# Patient Record
Sex: FEMALE | URBAN - METROPOLITAN AREA
[De-identification: names, ages, dates, MRNs, and addresses within clinical notes are randomized per-mention and may not be internally consistent; named-entity substitution may affect disease eponyms.]

---

## 2019-10-16 ENCOUNTER — APPOINTMENT (OUTPATIENT)
Age: 84
Setting detail: DERMATOLOGY
End: 2019-10-21

## 2019-10-16 DIAGNOSIS — L85.8 OTHER SPECIFIED EPIDERMAL THICKENING: ICD-10-CM

## 2019-10-16 DIAGNOSIS — L57.8 OTHER SKIN CHANGES DUE TO CHRONIC EXPOSURE TO NONIONIZING RADIATION: ICD-10-CM

## 2019-10-16 PROBLEM — D48.5 NEOPLASM OF UNCERTAIN BEHAVIOR OF SKIN: Status: ACTIVE | Noted: 2019-10-16

## 2019-10-16 PROCEDURE — 88305 TISSUE EXAM BY PATHOLOGIST: CPT

## 2019-10-16 PROCEDURE — 11102 TANGNTL BX SKIN SINGLE LES: CPT

## 2019-10-16 PROCEDURE — OTHER PATHOLOGY BILLING: OTHER

## 2019-10-16 PROCEDURE — OTHER BIOPSY BY SHAVE METHOD: OTHER

## 2019-10-16 PROCEDURE — OTHER SUNSCREEN RECOMMENDATIONS: OTHER

## 2019-10-16 PROCEDURE — 99202 OFFICE O/P NEW SF 15 MIN: CPT | Mod: 25

## 2019-10-16 ASSESSMENT — LOCATION DETAILED DESCRIPTION DERM
LOCATION DETAILED: LEFT CENTRAL MALAR CHEEK
LOCATION DETAILED: RIGHT INFERIOR CENTRAL MALAR CHEEK

## 2019-10-16 ASSESSMENT — LOCATION SIMPLE DESCRIPTION DERM
LOCATION SIMPLE: LEFT CHEEK
LOCATION SIMPLE: RIGHT CHEEK

## 2019-10-16 ASSESSMENT — LOCATION ZONE DERM: LOCATION ZONE: FACE

## 2019-10-16 NOTE — PROCEDURE: PATHOLOGY BILLING
Immunohistochemistry ([id number] and [id number]) billing is not performed here. Please use the Immunohistochemistry Stain Billing plan to accomplish this. Immunohistochemistry ([id number] and [id number]) billing is not performed here. Please use the Immunohistochemistry Stain Billing plan to accomplish this.

## 2019-10-16 NOTE — PROCEDURE: BIOPSY BY SHAVE METHOD
Bill For Surgical Tray: no
Detail Level: Simple
Size Of Lesion In Cm: 0
Hemostasis: Drysol
Render Post-Care Instructions In Note?: yes
Notification Instructions: Patient will be notified of biopsy results. However, patient instructed to call the office if not contacted within 2 weeks.
Wound Care: Petrolatum
Biopsy Type: H and E
Consent: Written consent was obtained and risks were reviewed including but not limited to scarring, infection, bleeding, scabbing, incomplete removal, nerve damage and allergy to anesthesia.
Dressing: bandage
Curettage Text: The wound bed was treated with curettage after the biopsy was performed.
Anesthesia Volume In Cc (Will Not Render If 0): 0.3
Electrodesiccation And Curettage Text: The wound bed was treated with electrodesiccation and curettage after the biopsy was performed.
Billing Type: Third-Party Bill
Electrodesiccation Text: The wound bed was treated with electrodesiccation after the biopsy was performed.
Silver Nitrate Text: The wound bed was treated with silver nitrate after the biopsy was performed.
Anesthesia Type: 1% lidocaine with epinephrine
Post-Care Instructions: I reviewed with the patient in detail post-care instructions. Patient is to keep the biopsy site dry overnight, and then apply vaseline or Aquaphor with a new bandaid daily until healed.
Type Of Destruction Used: Curettage
Depth Of Biopsy: dermis
Biopsy Method: Dermablade
Cryotherapy Text: The wound bed was treated with cryotherapy after the biopsy was performed.

## 2019-10-16 NOTE — PROCEDURE: SUNSCREEN RECOMMENDATIONS
Never
General Sunscreen Counseling: I recommended a broad spectrum sunscreen with a SPF of 30 or higher.  I explained that SPF 30 sunscreens block approximately 97 percent of the sun's harmful rays.  Sunscreens should be applied at least 15 minutes prior to expected sun exposure and then every 2 hours after that as long as sun exposure continues. If swimming or exercising sunscreen should be reapplied every 45 minutes to an hour after getting wet or sweating.  One ounce, or the equivalent of a shot glass full of sunscreen, is adequate to protect the skin not covered by a bathing suit. I also recommended a lip balm with a sunscreen as well. Sun protective clothing can be used in lieu of sunscreen but must be worn the entire time you are exposed to the sun's rays.
Detail Level: Generalized

## 2021-06-22 ENCOUNTER — APPOINTMENT (OUTPATIENT)
Dept: GENERAL RADIOLOGY | Facility: CLINIC | Age: 86
End: 2021-06-22
Attending: EMERGENCY MEDICINE
Payer: COMMERCIAL

## 2021-06-22 ENCOUNTER — HOSPITAL ENCOUNTER (EMERGENCY)
Facility: CLINIC | Age: 86
Discharge: HOME | End: 2021-06-22
Attending: EMERGENCY MEDICINE | Admitting: EMERGENCY MEDICINE
Payer: COMMERCIAL

## 2021-06-22 VITALS
DIASTOLIC BLOOD PRESSURE: 100 MMHG | RESPIRATION RATE: 20 BRPM | SYSTOLIC BLOOD PRESSURE: 143 MMHG | OXYGEN SATURATION: 99 % | HEART RATE: 60 BPM

## 2021-06-22 DIAGNOSIS — Z20.822 EXPOSURE TO COVID-19 VIRUS: ICD-10-CM

## 2021-06-22 DIAGNOSIS — Z20.822 SUSPECTED COVID-19 VIRUS INFECTION: ICD-10-CM

## 2021-06-22 LAB
ALBUMIN SERPL-MCNC: 3.1 G/DL (ref 3.4–5)
ALP SERPL-CCNC: 79 U/L (ref 40–150)
ALT SERPL W P-5'-P-CCNC: 52 U/L (ref 0–50)
ANION GAP SERPL CALCULATED.3IONS-SCNC: 11 MMOL/L (ref 3–14)
AST SERPL W P-5'-P-CCNC: 63 U/L (ref 0–45)
BASE EXCESS BLDV CALC-SCNC: 0 MMOL/L
BASOPHILS # BLD AUTO: 0 10E9/L (ref 0–0.2)
BASOPHILS NFR BLD AUTO: 0.4 %
BILIRUB SERPL-MCNC: 0.2 MG/DL (ref 0.2–1.3)
BUN SERPL-MCNC: 21 MG/DL (ref 7–30)
CALCIUM SERPL-MCNC: 9.1 MG/DL (ref 8.5–10.1)
CHLORIDE SERPL-SCNC: 101 MMOL/L (ref 94–109)
CO2 SERPL-SCNC: 25 MMOL/L (ref 20–32)
CREAT BLD-MCNC: 1.2 MG/DL (ref 0.52–1.04)
CREAT SERPL-MCNC: 1.15 MG/DL (ref 0.52–1.04)
DIFFERENTIAL METHOD BLD: ABNORMAL
EOSINOPHIL # BLD AUTO: 0 10E9/L (ref 0–0.7)
EOSINOPHIL NFR BLD AUTO: 0.4 %
ERYTHROCYTE [DISTWIDTH] IN BLOOD BY AUTOMATED COUNT: 12.2 % (ref 10–15)
GFR SERPL CREATININE-BSD FRML MDRD: 40 ML/MIN/{1.73_M2}
GFR SERPL CREATININE-BSD FRML MDRD: 42 ML/MIN/{1.73_M2}
GLUCOSE SERPL-MCNC: 151 MG/DL (ref 70–99)
HCO3 BLDV-SCNC: 27 MMOL/L (ref 21–28)
HCT VFR BLD AUTO: 35.4 % (ref 35–47)
HGB BLD-MCNC: 11.7 G/DL (ref 11.7–15.7)
IMM GRANULOCYTES # BLD: 0.1 10E9/L (ref 0–0.4)
IMM GRANULOCYTES NFR BLD: 1.6 %
LABORATORY COMMENT REPORT: ABNORMAL
LYMPHOCYTES # BLD AUTO: 1.1 10E9/L (ref 0.8–5.3)
LYMPHOCYTES NFR BLD AUTO: 22.2 %
MCH RBC QN AUTO: 32.1 PG (ref 26.5–33)
MCHC RBC AUTO-ENTMCNC: 33.1 G/DL (ref 31.5–36.5)
MCV RBC AUTO: 97 FL (ref 78–100)
MONOCYTES # BLD AUTO: 0.8 10E9/L (ref 0–1.3)
MONOCYTES NFR BLD AUTO: 16.4 %
NEUTROPHILS # BLD AUTO: 2.9 10E9/L (ref 1.6–8.3)
NEUTROPHILS NFR BLD AUTO: 59 %
NRBC # BLD AUTO: 0 10*3/UL
NRBC BLD AUTO-RTO: 0 /100
NT-PROBNP SERPL-MCNC: 678 PG/ML (ref 0–1800)
O2/TOTAL GAS SETTING VFR VENT: ABNORMAL %
OXYHGB MFR BLDV: 31 %
PCO2 BLDV: 52 MM HG (ref 40–50)
PH BLDV: 7.32 PH (ref 7.32–7.43)
PLATELET # BLD AUTO: 219 10E9/L (ref 150–450)
PO2 BLDV: 23 MM HG (ref 25–47)
POTASSIUM SERPL-SCNC: 4.4 MMOL/L (ref 3.4–5.3)
PROT SERPL-MCNC: 7.6 G/DL (ref 6.8–8.8)
RBC # BLD AUTO: 3.65 10E12/L (ref 3.8–5.2)
SARS-COV-2 RNA RESP QL NAA+PROBE: POSITIVE
SODIUM SERPL-SCNC: 137 MMOL/L (ref 133–144)
SPECIMEN SOURCE: ABNORMAL
WBC # BLD AUTO: 5 10E9/L (ref 4–11)

## 2021-06-22 PROCEDURE — 80053 COMPREHEN METABOLIC PANEL: CPT | Performed by: EMERGENCY MEDICINE

## 2021-06-22 PROCEDURE — 71045 X-RAY EXAM CHEST 1 VIEW: CPT

## 2021-06-22 PROCEDURE — 93005 ELECTROCARDIOGRAM TRACING: CPT

## 2021-06-22 PROCEDURE — 82805 BLOOD GASES W/O2 SATURATION: CPT | Performed by: EMERGENCY MEDICINE

## 2021-06-22 PROCEDURE — 82565 ASSAY OF CREATININE: CPT

## 2021-06-22 PROCEDURE — 87635 SARS-COV-2 COVID-19 AMP PRB: CPT | Performed by: EMERGENCY MEDICINE

## 2021-06-22 PROCEDURE — 99285 EMERGENCY DEPT VISIT HI MDM: CPT | Mod: 25

## 2021-06-22 PROCEDURE — 83880 ASSAY OF NATRIURETIC PEPTIDE: CPT | Performed by: EMERGENCY MEDICINE

## 2021-06-22 PROCEDURE — 85025 COMPLETE CBC W/AUTO DIFF WBC: CPT | Performed by: EMERGENCY MEDICINE

## 2021-06-22 PROCEDURE — C9803 HOPD COVID-19 SPEC COLLECT: HCPCS

## 2021-06-22 RX ORDER — DEXTROMETHORPHAN POLISTIREX 30 MG/5ML
60 SUSPENSION ORAL 2 TIMES DAILY
Qty: 89 ML | Refills: 0 | Status: ON HOLD | OUTPATIENT
Start: 2021-06-22 | End: 2021-07-01

## 2021-06-22 RX ORDER — ALBUTEROL SULFATE 90 UG/1
2 AEROSOL, METERED RESPIRATORY (INHALATION) EVERY 6 HOURS PRN
Qty: 6.7 G | Refills: 0 | Status: SHIPPED | OUTPATIENT
Start: 2021-06-22

## 2021-06-22 RX ORDER — DEXAMETHASONE 2 MG/1
6 TABLET ORAL ONCE
Qty: 3 TABLET | Refills: 0 | Status: ON HOLD | OUTPATIENT
Start: 2021-06-22 | End: 2021-07-01

## 2021-06-22 RX ORDER — OXYMETAZOLINE HCL 0.05 %
2 SPRAY, NON-AEROSOL (ML) NASAL 2 TIMES DAILY
Refills: 0 | COMMUNITY
Start: 2021-06-22 | End: 2021-06-25

## 2021-06-22 NOTE — ED TRIAGE NOTES
Pt is [place] speaking and arrives from home via EMS for cough and SOB x1 day. Family that she lives with was diagnosed with Covid 2 weeks ago. No increase in work of breathing, 99% on RA, all other VSS. Denies chest pain, abdominal pain, urinary sx. A&O x4

## 2021-06-22 NOTE — ED PROVIDER NOTES
History   Chief Complaint:  Cough and Shortness of Breath       HPI   Nhi Perry is a 89 year old female presenting for evaluation of 1 day cough without fevers or chills and no chest pain.  She was exposed to Covid through her daughter and son-in-law who were sick 2 weeks ago and 1 week ago respectively.  She reports intermittent productive cough, cough worse at night but no fevers chills or body aches.  She denies any chest pain, abdominal pain or chest pressure or shortness of breath.    Review of Systems  10 point review of systems completed, negative except as any can HPI    Allergies:  Amoxicillin    Medications:  Cetirizine  Citalopram  Ergocalciferol  Gabapentin  Hydrocodone-acetaminophen  Levothyroxine  Lisinopril  Metformin  Metoprolol  Oxycodone  Senna-docusate  Simvastatin  Meclizine  Memantine  Celocoxib  Bisacodyl  Quetiapine  Donepezil  Cyclobenzaprine  Zolpidem    Past Medical History:    Arthritis  ASCVD  Diabetes mellitus, type 2  Hypertension  Hypothyroidism  Intertrochanteric fracture of the left femur  Osteoporosis  Retina disorder, left  Thyroid disease  Intertochanteric fracture of right femur  Hip fracture, intertochanteric  Squamous cell carcinoma of skin  Insomnia  Macular degeneration of retina  Spinal stenosis of lumbar region  Depression  Degenerative joint disease  Nephrolithiasis    Past Surgical History:    Carotid endarterectomy  Cholecystectomy  Cataract removal  Genitourinary surgery  Open reduction internal fixation hip nailing    Family History:    Father: Cancer  Mother: Alzheimer disease, hypertension    Social History:  Family at home are also sick with similar symptoms    Physical Exam     Patient Vitals for the past 24 hrs:   BP Pulse Resp SpO2   06/22/21 1425 (!) 163/75 67 20 99 %       Physical Exam  Constitutional: Alert, attentive, GCS 15  HENT:    Nose: Nose normal.    Mouth/Throat: Oropharynx is clear, mucous membranes are moist  Eyes: EOM are normal,  anicteric, conjugate gaze  CV: regular rate and rhythm; no murmurs  Chest: Effort normal and breath sounds clear without wheezing or rales, symmetric bilaterally   GI:  non tender. No distension. No guarding or rebound.    MSK: No LE edema, no tenderness to palpation of BLE.  Neurological: Alert, attentive, moving all extremities equally.   Skin: Skin is warm and dry.    Emergency Department Course   ECG  ECG taken at 1419  Sinus rhythm with occasional premature ventricular complexes and fusion complexes  Otherwise normal ECG  No significant change as compared to prior, dated 14.  Rate 61 bpm. ME interval 202 ms. QRS duration 78 ms. QT/QTc 476/479 ms. P-R-T axes 73 58 69.     Imaging:  XR Chest Port 1 View  Single portable AP view of the chest was obtained.  Cardiomediastinal silhouette is within normal limits. There is a  densely calcified left pleural plaque. Biapical scarring. Bilateral  calcified pulmonary granulomas. No significant pleural effusion or  pneumothorax.  As per Radiology    Laboratory:  CBC: WBC 5.0, HGB 11.7,      CMP: Glucose 151 (H), GFR estimate 40 (L), albumin 3.1 (L), ALT 52 (H), AST 63 (H) o/w WNL (Creatinine 1.15 (H))     blood gas venous and oxyhgb: pH: 7.32, PCO2: 52 (H), PO2: 23 (L), bicarbonate: 27, Oxyhgb: 31, FIO2 RA, base excess 0.0    Creatinine POCT: Creatinine 1.2 (H), GFR estimate 42 (L)    BNP: 678    Symptomatic Covid-19 Virus PCR: Positive (A)    Emergency Department Course:    Reviewed:  I reviewed nursing notes, vitals, past medical history and care everywhere    Assessments:  1435 I obtained history and examined the patient as noted above.     1452 Spoke with family regarding patient's presentation and future plan    1615 I rechecked the patient and explained findings.     Disposition:  The patient was discharged to home.     Impression & Plan     Medical Decision Makin-year-old woman past medical history scant for diabetes, hypertension presenting for  evaluation of cough for 1 day in the setting of positive exposure to COVID-19.  She denies fevers, chills or body aches, she is intermittent productive cough over the last 1 day with no overt chest pain, chest pressure or shortness of breath.  Chest x-ray here is clear, labs are unremarkable including BNP, EKG shows no evidence of ischemia.  Low suspicion for ACS or PE given history.  I suspect probable early Covid, however given absence of hypoxia and no indication for hospitalization and patient is reportedly wheelchair-bound.  Both myself and the nurse back throughout the time discussing need for hospitalization with patient's daughter and given no clear indication at this time she was sent home via wheelchair.  I recommended monitoring of her breathing, I recommended Afrin at night as well as an albuterol inhaler as needed for cough.  Return precautions were reviewed and she was discharged.    Covid-19  Nhi Perry was evaluated during a global COVID-19 pandemic, which necessitated consideration that the patient might be at risk for infection with the SARS-CoV-2 virus that causes COVID-19.   Applicable protocols for evaluation were followed during the patient's care.   COVID-19 was considered as part of the patient's evaluation. The plan for testing is:  a test was obtained during this visit.    Diagnosis:    ICD-10-CM    1. Exposure to COVID-19 virus  Z20.822    2. Suspected COVID-19 virus infection  Z20.822        Discharge Medications:  New Prescriptions    ALBUTEROL (PROAIR HFA/PROVENTIL HFA/VENTOLIN HFA) 108 (90 BASE) MCG/ACT INHALER    Inhale 2 puffs into the lungs every 6 hours as needed for shortness of breath / dyspnea or wheezing    OXYMETAZOLINE (AFRIN NASAL SPRAY) 0.05 % NASAL SPRAY    Spray 2 sprays in nostril 2 times daily for 3 days     Rufino See MD  [employer]  8:34 PM 06/22/21     Scribe Disclosure:  Mandeep JHA, am serving as a scribe at  2:38 PM on 6/22/2021 to document services personally performed by Rufino See MD based on my observations and the provider's statements to me.    I, Quinn Thomas, am serving as a scribe  at 4:31 PM on 6/22/2021 to document services personally performed by Rufino See MD based on my observations and the provider's statements to me.               Rufino See MD  06/22/21 2035

## 2021-06-22 NOTE — DISCHARGE INSTRUCTIONS
Discharge Instructions  COVID-19    COVID-19 is the disease caused by a new coronavirus. The virus spreads from person-to-person primarily by droplets when an infected person coughs or sneezes and the droplets are then breathed in by another person. There are tests available to diagnose COVID-19. You may have been diagnosed with COVID, may be being tested for COVID and have a pending test result, or may have been exposed to COVID.    Symptoms of COVID-19  Many people have no symptoms or mild symptoms.  Symptoms may usually appear 4 to 5 days (up to 14 days) after contact with a person with COVID-19. Some people will get severe symptoms and pneumonia. Usual symptoms are:     ? Fever  ? Cough  ? Trouble breathing    Less common symptoms are: Headache, body aches, sore throat, sneezing, diarrhea, loss of taste or smell.    Isolation and Quarantine    You may have been seen because you have symptoms, had an exposure, or had some other concern about possible COVID. The best way to stop the spread of the virus is to avoid contact with others.    Isolation refers to sick people staying away from people who are not sick. A person in quarantine is limiting activity because they were exposed and are waiting to see if they might become sick.    If you test positive for COVID, you should stay home (isolation) for at least 10 days after your symptoms began, and for 24 hours with no fever and improvement of symptoms--whichever is longer. (Your fever should be gone for 24 hours without using fever-reducing medicine). If you have no symptoms, you should stay home (isolation) for 10 days from the day of the test. If you have been vaccinated for COVID, the vaccination will not cause you to test positive so a positive test result generally is a  true positive .    For example, if you have a fever and cough for 6 days, you need to stay home 4 more days with no fever for a total of 10 days. Or, if you have a fever and cough for 10 days,  you need to stay home one more day with no fever for a total of 11 days.    If you have a high-risk exposure to COVID (you spent 15 minutes or more within six feet of somebody who has COVID), you should stay home (quarantine) for 14 days, unless you are vaccinated. Even if you test negative for COVID, the CDC recommends a 14-day quarantine from the time of your last exposure to that individual (unless you are vaccinated). There are options for a shortened (<14 day quarantine) you can review at:  https://[email or web address]./diseases/coronavirus/close.html#long    If you live in the same house as somebody with COVID and cannot separate from them, you will need to quarantine for 14-days after that person's isolation (infectious) period. That means that you may need to quarantine for 24-days after that person became symptomatic/ill.    If you are vaccinated and do not develop symptoms, you do not need to quarantine after exposure.    If you have symptoms but a negative test, you should stay at home until you are symptom-free and without fever for 24 hours, using the same judgment you would for when it is safe to return to work/school from strep throat, influenza, or the common cold. If you worsen, you should consider being re-evaluated.    If you are being tested for COVID because of symptoms and your test is pending, you should stay home until you know your test result.    If I have COVID, how should I protect myself and others?    Do not go to work or school. Have a friend or relative do your shopping. Do not use public transportation (bus, train) or ridesharing (Lyft, Uber).    Separate yourself from other people in your home. As much as possible, you should stay in one room and away from other people in your home. Also, use a separate bathroom, if possible. Avoid handling pets or other animals while sick.     Wear a facemask if you need to be around other people and cover your mouth and nose with a tissue when  you cough or sneeze.     Avoid sharing personal household items. You should not share dishes, drinking glasses, forks/knives/spoons, towels, or bedding with other people in your home. After using these items, they should be washed with soap and water. Clean parts of your home that are touched often (doorknobs, faucets, countertops, etc.) daily.     Wash your hands often with soap and water for at least 20 seconds or use an alcohol-based hand  containing at least 60% alcohol.     Avoid touching your face.    Treat your symptoms. You can take Acetaminophen (Tylenol) to treat body aches and fever as needed for comfort. Ibuprofen (Advil or Motrin) can be used as well if you still have symptoms after taking Tylenol. Drink fluids. Rest.    Watch for worsening symptoms such as shortness of breath/difficulty breathing or very severe weakness.    Employers/workplaces are being asked by the Centers for Disease Control (CDC) to not request notes/documentation for you to return to work or prove that you were ill. You may choose to show your employer this paperwork. Also, repeat testing should not be required to return to work.    Exercise/Sports in rare cases, COVID could affect your heart in a way that makes exercise or participation in sports dangerous.    If you have a mild COVID illness (fever, cough, sore throat, and similar symptoms but no difficulty breathing or abnormalities of the lung): After your COVID symptoms have resolved, wait 14-days before returning to activity.  If you have more than a mild illness (meaning that you have problems with your breathing or lungs) or if you participate in competitive or strenuous activity or have a history of heart disease: Please see your primary doctor/provider prior to return to activity/competition.    Antibody treatments are available for patients with mild to moderate COVID illness in order to prevent severe illness. In general, only patients with risk factors for  severe illness are eligible for treatment. For more information, to see if you are eligible, and to find treatment, go to the [facility]:  [email or web address]./diseases/coronavirus/mnrap.html     Return to the Emergency Department if:    If you are developing worsening breathing, shortness of breath, or feel worse you should seek medical attention.  If you are uncertain, contact your health care provider/clinic. If you need emergency medical attention, call 911 and tell them you have been ill.

## 2021-06-22 NOTE — ED NOTES
Talked to pt's daughter to inform her pt will be returning home. Confirmed address and let her know we will be sending wheelchair transport home.

## 2021-06-23 LAB — INTERPRETATION ECG - MUSE: NORMAL

## 2021-07-01 ENCOUNTER — HOSPITAL ENCOUNTER (INPATIENT)
Facility: CLINIC | Age: 86
LOS: 4 days | Discharge: HOME | DRG: 177 | End: 2021-07-05
Attending: PHYSICIAN ASSISTANT | Admitting: INTERNAL MEDICINE
Payer: COMMERCIAL

## 2021-07-01 ENCOUNTER — APPOINTMENT (OUTPATIENT)
Dept: CT IMAGING | Facility: CLINIC | Age: 86
DRG: 177 | End: 2021-07-01
Attending: PHYSICIAN ASSISTANT
Payer: COMMERCIAL

## 2021-07-01 ENCOUNTER — APPOINTMENT (OUTPATIENT)
Dept: GENERAL RADIOLOGY | Facility: CLINIC | Age: 86
DRG: 177 | End: 2021-07-01
Attending: PHYSICIAN ASSISTANT
Payer: COMMERCIAL

## 2021-07-01 DIAGNOSIS — J12.82 PNEUMONIA DUE TO 2019 NOVEL CORONAVIRUS: ICD-10-CM

## 2021-07-01 DIAGNOSIS — M62.81 GENERALIZED MUSCLE WEAKNESS: ICD-10-CM

## 2021-07-01 DIAGNOSIS — G93.49 INFECTIOUS ENCEPHALOPATHY: ICD-10-CM

## 2021-07-01 DIAGNOSIS — E87.1 HYPONATREMIA: ICD-10-CM

## 2021-07-01 DIAGNOSIS — B99.9 INFECTIOUS ENCEPHALOPATHY: ICD-10-CM

## 2021-07-01 DIAGNOSIS — E87.5 HYPERKALEMIA: ICD-10-CM

## 2021-07-01 DIAGNOSIS — U07.1 PNEUMONIA DUE TO 2019 NOVEL CORONAVIRUS: ICD-10-CM

## 2021-07-01 LAB
ABO + RH BLD: NORMAL
ABO + RH BLD: NORMAL
ALBUMIN SERPL-MCNC: 2.4 G/DL (ref 3.4–5)
ALBUMIN SERPL-MCNC: 2.7 G/DL (ref 3.4–5)
ALP SERPL-CCNC: 102 U/L (ref 40–150)
ALP SERPL-CCNC: 94 U/L (ref 40–150)
ALT SERPL W P-5'-P-CCNC: 57 U/L (ref 0–50)
ALT SERPL W P-5'-P-CCNC: 66 U/L (ref 0–50)
ANION GAP SERPL CALCULATED.3IONS-SCNC: 5 MMOL/L (ref 3–14)
ANION GAP SERPL CALCULATED.3IONS-SCNC: 7 MMOL/L (ref 3–14)
APTT PPP: 33 SEC (ref 22–37)
AST SERPL W P-5'-P-CCNC: 30 U/L (ref 0–45)
AST SERPL W P-5'-P-CCNC: 35 U/L (ref 0–45)
BASE DEFICIT BLDV-SCNC: 3.2 MMOL/L
BASOPHILS # BLD AUTO: 0 10E9/L (ref 0–0.2)
BASOPHILS # BLD AUTO: 0 10E9/L (ref 0–0.2)
BASOPHILS NFR BLD AUTO: 0.2 %
BASOPHILS NFR BLD AUTO: 0.5 %
BILIRUB SERPL-MCNC: 0.2 MG/DL (ref 0.2–1.3)
BILIRUB SERPL-MCNC: 0.4 MG/DL (ref 0.2–1.3)
BUN SERPL-MCNC: 34 MG/DL (ref 7–30)
BUN SERPL-MCNC: 35 MG/DL (ref 7–30)
CALCIUM SERPL-MCNC: 7.9 MG/DL (ref 8.5–10.1)
CALCIUM SERPL-MCNC: 8.4 MG/DL (ref 8.5–10.1)
CHLORIDE SERPL-SCNC: 105 MMOL/L (ref 94–109)
CHLORIDE SERPL-SCNC: 99 MMOL/L (ref 94–109)
CO2 SERPL-SCNC: 20 MMOL/L (ref 20–32)
CO2 SERPL-SCNC: 23 MMOL/L (ref 20–32)
CREAT SERPL-MCNC: 1.21 MG/DL (ref 0.52–1.04)
CREAT SERPL-MCNC: 1.48 MG/DL (ref 0.52–1.04)
CRP SERPL-MCNC: 36.1 MG/L (ref 0–8)
D DIMER PPP FEU-MCNC: 1.4 UG/ML FEU (ref 0–0.5)
D DIMER PPP FEU-MCNC: 1.7 UG/ML FEU (ref 0–0.5)
DIFFERENTIAL METHOD BLD: ABNORMAL
DIFFERENTIAL METHOD BLD: ABNORMAL
EOSINOPHIL # BLD AUTO: 0 10E9/L (ref 0–0.7)
EOSINOPHIL # BLD AUTO: 0.1 10E9/L (ref 0–0.7)
EOSINOPHIL NFR BLD AUTO: 0.2 %
EOSINOPHIL NFR BLD AUTO: 3 %
ERYTHROCYTE [DISTWIDTH] IN BLOOD BY AUTOMATED COUNT: 12.6 % (ref 10–15)
ERYTHROCYTE [DISTWIDTH] IN BLOOD BY AUTOMATED COUNT: 12.7 % (ref 10–15)
FIBRINOGEN PPP-MCNC: 519 MG/DL (ref 200–420)
GFR SERPL CREATININE-BSD FRML MDRD: 30 ML/MIN/{1.73_M2}
GFR SERPL CREATININE-BSD FRML MDRD: 38 ML/MIN/{1.73_M2}
GLUCOSE BLDC GLUCOMTR-MCNC: 286 MG/DL (ref 70–99)
GLUCOSE BLDC GLUCOMTR-MCNC: 441 MG/DL (ref 70–99)
GLUCOSE SERPL-MCNC: 218 MG/DL (ref 70–99)
GLUCOSE SERPL-MCNC: 279 MG/DL (ref 70–99)
HBA1C MFR BLD: 6.8 % (ref 0–5.6)
HCO3 BLDV-SCNC: 23 MMOL/L (ref 21–28)
HCT VFR BLD AUTO: 29.7 % (ref 35–47)
HCT VFR BLD AUTO: 33.9 % (ref 35–47)
HGB BLD-MCNC: 11 G/DL (ref 11.7–15.7)
HGB BLD-MCNC: 9.7 G/DL (ref 11.7–15.7)
IMM GRANULOCYTES # BLD: 0.1 10E9/L (ref 0–0.4)
IMM GRANULOCYTES # BLD: 0.1 10E9/L (ref 0–0.4)
IMM GRANULOCYTES NFR BLD: 2 %
IMM GRANULOCYTES NFR BLD: 2.4 %
INR PPP: 1.14 (ref 0.86–1.14)
INTERPRETATION ECG - MUSE: NORMAL
LDH SERPL L TO P-CCNC: 211 U/L (ref 81–234)
LYMPHOCYTES # BLD AUTO: 0.3 10E9/L (ref 0.8–5.3)
LYMPHOCYTES # BLD AUTO: 0.6 10E9/L (ref 0.8–5.3)
LYMPHOCYTES NFR BLD AUTO: 15.2 %
LYMPHOCYTES NFR BLD AUTO: 7.6 %
MCH RBC QN AUTO: 30.6 PG (ref 26.5–33)
MCH RBC QN AUTO: 30.9 PG (ref 26.5–33)
MCHC RBC AUTO-ENTMCNC: 32.4 G/DL (ref 31.5–36.5)
MCHC RBC AUTO-ENTMCNC: 32.7 G/DL (ref 31.5–36.5)
MCV RBC AUTO: 94 FL (ref 78–100)
MCV RBC AUTO: 95 FL (ref 78–100)
MONOCYTES # BLD AUTO: 0.1 10E9/L (ref 0–1.3)
MONOCYTES # BLD AUTO: 0.3 10E9/L (ref 0–1.3)
MONOCYTES NFR BLD AUTO: 2.4 %
MONOCYTES NFR BLD AUTO: 6.8 %
NEUTROPHILS # BLD AUTO: 2.9 10E9/L (ref 1.6–8.3)
NEUTROPHILS # BLD AUTO: 3.7 10E9/L (ref 1.6–8.3)
NEUTROPHILS NFR BLD AUTO: 72.5 %
NEUTROPHILS NFR BLD AUTO: 87.2 %
NRBC # BLD AUTO: 0 10*3/UL
NRBC # BLD AUTO: 0 10*3/UL
NRBC BLD AUTO-RTO: 0 /100
NRBC BLD AUTO-RTO: 0 /100
NT-PROBNP SERPL-MCNC: 315 PG/ML (ref 0–1800)
O2/TOTAL GAS SETTING VFR VENT: ABNORMAL %
OXYHGB MFR BLDV: 32 %
PCO2 BLDV: 46 MM HG (ref 40–50)
PH BLDV: 7.31 PH (ref 7.32–7.43)
PLATELET # BLD AUTO: 170 10E9/L (ref 150–450)
PLATELET # BLD AUTO: 189 10E9/L (ref 150–450)
PO2 BLDV: 22 MM HG (ref 25–47)
POTASSIUM SERPL-SCNC: 5.4 MMOL/L (ref 3.4–5.3)
POTASSIUM SERPL-SCNC: 5.7 MMOL/L (ref 3.4–5.3)
PROT SERPL-MCNC: 6.7 G/DL (ref 6.8–8.8)
PROT SERPL-MCNC: 7.2 G/DL (ref 6.8–8.8)
RBC # BLD AUTO: 3.14 10E12/L (ref 3.8–5.2)
RBC # BLD AUTO: 3.59 10E12/L (ref 3.8–5.2)
SODIUM SERPL-SCNC: 129 MMOL/L (ref 133–144)
SODIUM SERPL-SCNC: 130 MMOL/L (ref 133–144)
SPECIMEN EXP DATE BLD: NORMAL
TROPONIN I SERPL-MCNC: <0.015 UG/L (ref 0–0.04)
TSH SERPL DL<=0.005 MIU/L-ACNC: 1.08 MU/L (ref 0.4–4)
WBC # BLD AUTO: 4 10E9/L (ref 4–11)
WBC # BLD AUTO: 4.2 10E9/L (ref 4–11)

## 2021-07-01 PROCEDURE — 83880 ASSAY OF NATRIURETIC PEPTIDE: CPT | Performed by: PHYSICIAN ASSISTANT

## 2021-07-01 PROCEDURE — 80053 COMPREHEN METABOLIC PANEL: CPT | Performed by: INTERNAL MEDICINE

## 2021-07-01 PROCEDURE — 80053 COMPREHEN METABOLIC PANEL: CPT | Performed by: PHYSICIAN ASSISTANT

## 2021-07-01 PROCEDURE — 84484 ASSAY OF TROPONIN QUANT: CPT | Performed by: INTERNAL MEDICINE

## 2021-07-01 PROCEDURE — 999N001017 HC STATISTIC GLUCOSE BY METER IP

## 2021-07-01 PROCEDURE — 258N000003 HC RX IP 258 OP 636: Performed by: PHYSICIAN ASSISTANT

## 2021-07-01 PROCEDURE — 94640 AIRWAY INHALATION TREATMENT: CPT

## 2021-07-01 PROCEDURE — 99285 EMERGENCY DEPT VISIT HI MDM: CPT | Mod: 25

## 2021-07-01 PROCEDURE — 85025 COMPLETE CBC W/AUTO DIFF WBC: CPT | Performed by: INTERNAL MEDICINE

## 2021-07-01 PROCEDURE — 84443 ASSAY THYROID STIM HORMONE: CPT | Performed by: PHYSICIAN ASSISTANT

## 2021-07-01 PROCEDURE — 83615 LACTATE (LD) (LDH) ENZYME: CPT | Performed by: INTERNAL MEDICINE

## 2021-07-01 PROCEDURE — 85730 THROMBOPLASTIN TIME PARTIAL: CPT | Performed by: INTERNAL MEDICINE

## 2021-07-01 PROCEDURE — 250N000011 HC RX IP 250 OP 636: Performed by: PHYSICIAN ASSISTANT

## 2021-07-01 PROCEDURE — 99207 PR CDG-CODE CATEGORY CHANGED: CPT | Performed by: INTERNAL MEDICINE

## 2021-07-01 PROCEDURE — 250N000009 HC RX 250: Performed by: PHYSICIAN ASSISTANT

## 2021-07-01 PROCEDURE — 250N000009 HC RX 250: Performed by: INTERNAL MEDICINE

## 2021-07-01 PROCEDURE — 85610 PROTHROMBIN TIME: CPT | Performed by: INTERNAL MEDICINE

## 2021-07-01 PROCEDURE — 36415 COLL VENOUS BLD VENIPUNCTURE: CPT | Performed by: INTERNAL MEDICINE

## 2021-07-01 PROCEDURE — 250N000011 HC RX IP 250 OP 636: Performed by: INTERNAL MEDICINE

## 2021-07-01 PROCEDURE — 96361 HYDRATE IV INFUSION ADD-ON: CPT

## 2021-07-01 PROCEDURE — 85025 COMPLETE CBC W/AUTO DIFF WBC: CPT | Performed by: PHYSICIAN ASSISTANT

## 2021-07-01 PROCEDURE — 82805 BLOOD GASES W/O2 SATURATION: CPT | Performed by: PHYSICIAN ASSISTANT

## 2021-07-01 PROCEDURE — 99223 1ST HOSP IP/OBS HIGH 75: CPT | Mod: AI | Performed by: INTERNAL MEDICINE

## 2021-07-01 PROCEDURE — 71045 X-RAY EXAM CHEST 1 VIEW: CPT

## 2021-07-01 PROCEDURE — 85384 FIBRINOGEN ACTIVITY: CPT | Performed by: INTERNAL MEDICINE

## 2021-07-01 PROCEDURE — 96374 THER/PROPH/DIAG INJ IV PUSH: CPT

## 2021-07-01 PROCEDURE — 83036 HEMOGLOBIN GLYCOSYLATED A1C: CPT | Performed by: INTERNAL MEDICINE

## 2021-07-01 PROCEDURE — 71275 CT ANGIOGRAPHY CHEST: CPT

## 2021-07-01 PROCEDURE — 86901 BLOOD TYPING SEROLOGIC RH(D): CPT | Performed by: INTERNAL MEDICINE

## 2021-07-01 PROCEDURE — 258N000003 HC RX IP 258 OP 636: Performed by: INTERNAL MEDICINE

## 2021-07-01 PROCEDURE — 85379 FIBRIN DEGRADATION QUANT: CPT | Performed by: PHYSICIAN ASSISTANT

## 2021-07-01 PROCEDURE — 86900 BLOOD TYPING SEROLOGIC ABO: CPT | Performed by: INTERNAL MEDICINE

## 2021-07-01 PROCEDURE — 120N000001 HC R&B MED SURG/OB

## 2021-07-01 PROCEDURE — 86140 C-REACTIVE PROTEIN: CPT | Performed by: INTERNAL MEDICINE

## 2021-07-01 PROCEDURE — XW033E5 INTRODUCTION OF REMDESIVIR ANTI-INFECTIVE INTO PERIPHERAL VEIN, PERCUTANEOUS APPROACH, NEW TECHNOLOGY GROUP 5: ICD-10-PCS | Performed by: INTERNAL MEDICINE

## 2021-07-01 PROCEDURE — 85379 FIBRIN DEGRADATION QUANT: CPT | Performed by: INTERNAL MEDICINE

## 2021-07-01 PROCEDURE — 93005 ELECTROCARDIOGRAM TRACING: CPT

## 2021-07-01 RX ORDER — IPRATROPIUM BROMIDE AND ALBUTEROL SULFATE 2.5; .5 MG/3ML; MG/3ML
3 SOLUTION RESPIRATORY (INHALATION) ONCE
Status: COMPLETED | OUTPATIENT
Start: 2021-07-01 | End: 2021-07-01

## 2021-07-01 RX ORDER — HALOPERIDOL 1 MG/1
1 TABLET ORAL EVERY 6 HOURS PRN
Status: DISCONTINUED | OUTPATIENT
Start: 2021-07-01 | End: 2021-07-05 | Stop reason: HOSPADM

## 2021-07-01 RX ORDER — DEXAMETHASONE SODIUM PHOSPHATE 10 MG/ML
6 INJECTION, SOLUTION INTRAMUSCULAR; INTRAVENOUS ONCE
Status: COMPLETED | OUTPATIENT
Start: 2021-07-01 | End: 2021-07-01

## 2021-07-01 RX ORDER — NICOTINE POLACRILEX 4 MG
15-30 LOZENGE BUCCAL
Status: DISCONTINUED | OUTPATIENT
Start: 2021-07-01 | End: 2021-07-05 | Stop reason: HOSPADM

## 2021-07-01 RX ORDER — CYCLOBENZAPRINE HCL 10 MG
10 TABLET ORAL DAILY
COMMUNITY

## 2021-07-01 RX ORDER — IOPAMIDOL 755 MG/ML
500 INJECTION, SOLUTION INTRAVASCULAR ONCE
Status: COMPLETED | OUTPATIENT
Start: 2021-07-01 | End: 2021-07-01

## 2021-07-01 RX ORDER — CELECOXIB 200 MG/1
200 CAPSULE ORAL DAILY PRN
COMMUNITY

## 2021-07-01 RX ORDER — LIDOCAINE 40 MG/G
CREAM TOPICAL
Status: DISCONTINUED | OUTPATIENT
Start: 2021-07-01 | End: 2021-07-05 | Stop reason: HOSPADM

## 2021-07-01 RX ORDER — DONEPEZIL HYDROCHLORIDE 10 MG/1
10 TABLET, FILM COATED ORAL AT BEDTIME
COMMUNITY

## 2021-07-01 RX ORDER — ZOLPIDEM TARTRATE 5 MG/1
5 TABLET ORAL AT BEDTIME
COMMUNITY

## 2021-07-01 RX ORDER — DEXTROSE MONOHYDRATE 25 G/50ML
25-50 INJECTION, SOLUTION INTRAVENOUS
Status: DISCONTINUED | OUTPATIENT
Start: 2021-07-01 | End: 2021-07-05 | Stop reason: HOSPADM

## 2021-07-01 RX ORDER — MEMANTINE HYDROCHLORIDE 28 MG/1
28 CAPSULE, EXTENDED RELEASE ORAL DAILY
COMMUNITY

## 2021-07-01 RX ADMIN — IPRATROPIUM BROMIDE AND ALBUTEROL SULFATE 3 ML: .5; 3 SOLUTION RESPIRATORY (INHALATION) at 13:01

## 2021-07-01 RX ADMIN — SODIUM CHLORIDE 50 ML: 9 INJECTION, SOLUTION INTRAVENOUS at 19:06

## 2021-07-01 RX ADMIN — DEXAMETHASONE SODIUM PHOSPHATE 6 MG: 10 INJECTION, SOLUTION INTRAMUSCULAR; INTRAVENOUS at 13:07

## 2021-07-01 RX ADMIN — REMDESIVIR 200 MG: 100 INJECTION, POWDER, LYOPHILIZED, FOR SOLUTION INTRAVENOUS at 19:04

## 2021-07-01 RX ADMIN — SODIUM CHLORIDE 83 ML: 9 INJECTION, SOLUTION INTRAVENOUS at 14:12

## 2021-07-01 RX ADMIN — IOPAMIDOL 64 ML: 755 INJECTION, SOLUTION INTRAVENOUS at 14:12

## 2021-07-01 RX ADMIN — IPRATROPIUM BROMIDE AND ALBUTEROL SULFATE 3 ML: .5; 3 SOLUTION RESPIRATORY (INHALATION) at 13:02

## 2021-07-01 RX ADMIN — SODIUM CHLORIDE 1000 ML: 9 INJECTION, SOLUTION INTRAVENOUS at 13:57

## 2021-07-01 RX ADMIN — ENOXAPARIN SODIUM 40 MG: 40 INJECTION SUBCUTANEOUS at 20:36

## 2021-07-01 ASSESSMENT — ACTIVITIES OF DAILY LIVING (ADL): ADLS_ACUITY_SCORE: 37

## 2021-07-01 NOTE — PROVIDER NOTIFICATION
1649 Dr. Chandler euceda- Patient arrived to floor from ED. Thanks!    Claudia Zuleta, RN on 7/1/2021 at 4:49 PM

## 2021-07-01 NOTE — H&P
[facility]    History and Physical - Hospitalist Service       Date of Admission:  7/1/2021    Assessment & Plan      Nhi Perry is a 89 year old female admitted on 7/1/2021. She became Covid +1-week before admission and came in with shortness of breath and infiltrate seen on chest x-ray bilaterally in her lungs    COVID-19 infection  Bilateral pneumonia due to COVID-19 infection  Remdesivir and dexamethasone will be started as per protocol  Anticoagulation in the form of enoxaparin  We will monitor oxygen needs in the hospital in a patient with shortness of breath    Metabolic encephalopathy  Probably on the basis of Covid pneumonia's versus residual cognitive impairment  We will monitor the patient closely and give Haldol should she become agitated    Cystic fibrosis  Home medications will be kept restarted when MAR is completed    Acute hyperkalemia  BMP will be followed  At the present time potassium is not significantly elevated    Hypertension  Home dose of lisinopril 20 mg daily will be continued once dedication reconciliation is completed    2 diabetes mellitus  Metformin will not be given sliding scale insulin will be given instead during the hospitalization    Hypothyroidism  Thyroxine will be restarted once medication reconciliation is completed    Hypercholesteremia  Simvastatin will be continued    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  Management as outpatient       Diet:  Diabetic diet  DVT Prophylaxis: Enoxaparin (Lovenox) SQ  Hawley Catheter: Not present  Central Lines: None  Code Status:  Full code until CODE STATUS is determined after talking to family    Risk Factors Present on Admission         # Hyperkalemia: K = 5.4 mmol/L (Ref range: 3.4 - 5.3 mmol/L) on admission, will monitor as appropriate  # Hyponatremia: Na = 129 mmol/L (Ref range: 133 - 144 mmol/L) on admission, will monitor as appropriate      # Platelet Defect: home medication list includes an  antiplatelet medication      Disposition Plan   Expected discharge: Unknown at this time,    Darrell Arteaga MD  [facility]  Securely message with the [employer] Web Console (learn more here)  Text page via [employer] Paging/Directory      ______________________________________________________________________    Chief Complaint   Shortness of breath    History is obtained from medical records and the discussion I had with the emergency department physician    History of Present Illness   Nhi Perry is a 89 year old Prydeinig-speaking lady who lives with her son and daughter.  She otherwise has cystic fibrosis, hypertension, type 2 diabetes mellitus..    Patient lives with her daughter and son-in-law.  Daughter got Covid + 6/8/2021.  Son-in-law turned positive for Covid 621 [id number].  Since 6/20/2021 patient has had headache fevers up to 99.5  F, generalized body aches cough shortness of breath and confusion because of which patient went to her doctor on 6/22/2021.  I think a test was done on this date and was positive.  She came to the emergency department of [facility] 7/1/2021 with increased shortness of breath and no improvement in her symptoms as mentioned above.  She was also hallucinating according to the family.  Unfortunately I was unable to get a hold of the family on the phone listed in the medical records.    In the emergency department her temperature was 98.6  F pulse was 96 respirations 24 blood pressure 100/61 her oxygen saturation was 97% on nasal cannula O2.  Chest x-ray showed bilateral lower lungs with hazy infiltrates.  The D-dimer was up a CT scan of the chest was done with contrast with did not reveal any pulmonary embolism.  There was no consolidation or infiltrate seen on the chest CT but old granulomatous disease with large calcified nodules were seen and calcified pleural plaque's involving upper lung were seen which could reflect previous asbestosis exposure.      Patient was started on IV dexamethasone in the emergency department.        Review of Systems    Unable to obtain a reliable review of systems because of language barrier.   services were not useful.     Past Medical History    I have reviewed this patient's medical history and updated it with pertinent information if needed.   Past Medical History:   Diagnosis Date     Arthritis      ASCVD (arteriosclerotic cardiovascular disease)     carotid surgery     DM II (diabetes mellitus, type II), controlled (H)      HTN (hypertension)      hypothyroidism      Intertrochanteric fracture of left femur (H) 2012    no surgical repair     Osteoporosis      Retina disorder, left     hemorrhage     Seasonal allergies      Thyroid disease        Past Surgical History   I have reviewed this patient's surgical history and updated it with pertinent information if needed.  Past Surgical History:   Procedure Laterality Date     CAROTID ENDARTERECTOMY      with vein graft     CHOLECYSTECTOMY       EYE SURGERY      right cateract     GENITOURINARY SURGERY      lithotripsy for kidney stone     GI SURGERY      colonoscopy/ polypectomy     OPEN REDUCTION INTERNAL FIXATION HIP NAILING  7/28/2014    Procedure: OPEN REDUCTION INTERNAL FIXATION HIP NAILING;  Surgeon: Gee Garcia MD;  Location:  OR       Social History   I have reviewed this patient's social history and updated it with pertinent information if needed.  Social History     Tobacco Use     Smoking status: Never Smoker   Substance Use Topics     Alcohol use: No     Alcohol/week: 0.0 standard drinks     Drug use: No       Family History   I have reviewed this patient's family history and updated it with pertinent information if needed.  Family History   Problem Relation Age of Onset     Cancer Father      Alzheimer Disease Mother      Hypertension Mother      C.A.D. No family hx of        Prior to Admission Medications   Prior to Admission Medications    Prescriptions Last Dose Informant Patient Reported? Taking?   ACETAMINOPHEN PO   Yes No   Sig: Take 650 mg by mouth as needed for pain   CITALOPRAM HYDROBROMIDE PO   Yes No   Sig: Take 40 mg by mouth daily.   Cetirizine HCl (ZYRTEC ALLERGY PO)   Yes No   Sig: Take 10 mg by mouth daily as needed.   GABAPENTIN PO   Yes No   Sig: Take 100 mg by mouth every other day. Takes for her balance.    HYDROcodone-acetaminophen (NORCO) 5-325 MG per tablet   No No   Sig: Take 1 tablet by mouth every 4 hours as needed for moderate to severe pain   LEVOTHYROXINE SODIUM PO   Yes No   Sig: Take 50 mcg by mouth daily.   Olopatadine HCl (PATANOL OP)   Yes No   Sig: Place 1 drop into both eyes as needed   OxyCODONE HCl (OXYCONTIN PO)   Yes No   Sig: Take 10 mg by mouth every 12 hours as needed   SIMVASTATIN PO   Yes No   Sig: Take 40 mg by mouth daily.   albuterol (PROAIR HFA/PROVENTIL HFA/VENTOLIN HFA) 108 (90 Base) MCG/ACT inhaler   No No   Sig: Inhale 2 puffs into the lungs every 6 hours as needed for shortness of breath / dyspnea or wheezing   aspirin 81 MG tablet   Yes No   Sig: Take 81 mg by mouth every morning   bisacodyl (DULCOLAX) 10 MG suppository   No No   Sig: Place 1 suppository rectally daily as needed.   dexamethasone (DECADRON) 2 MG tablet   No No   Sig: Take 3 tablets (6 mg) by mouth once for 1 dose   dextromethorphan (DELSYM) 30 MG/5ML liquid   No No   Sig: Take 10 mLs (60 mg) by mouth 2 times daily   ergocalciferol (ERGOCALCIFEROL) 37276 UNIT capsule   Yes No   Sig: Take 50,000 Units by mouth Two times a week (Mondays and Fridays)   lisinopril (PRINIVIL,ZESTRIL) 20 MG tablet   No No   Sig: Take 1 tablet by mouth daily.   metFORMIN (GLUCOPHAGE) 500 MG tablet   No No   Sig: Take 1 tablet by mouth 2 times daily (with meals).   metoprolol (LOPRESSOR) 100 MG tablet   No No   Sig: Take 1 tablet by mouth 2 times daily.   oxyCODONE-acetaminophen (PERCOCET) 5-325 MG per tablet   No No   Sig: Take 1-2 tablets by mouth every 4  hours as needed.   polyethylene glycol (MIRALAX/GLYCOLAX) packet   No No   Sig: Take 17 g by mouth daily.   senna-docusate (SENOKOT-S;PERICOLACE) 8.6-50 MG per tablet   No No   Sig: Take 2 tablets by mouth 2 times daily.      Facility-Administered Medications: None     Allergies   Allergies   Allergen Reactions     Seasonal Allergies        Physical Exam   Vital Signs: Temp: 98.6  F (37  C) Temp src: Oral BP: 125/59 Pulse: 91   Resp: 24 SpO2: 94 %      Weight: 0 lbs 0 oz      GENERAL:  Patient does not look in any acute distress  HEENT:  EOM+, Conjunctiva is clear    NECK: Did not see Jugular Venous distention  HEART: S1 S2  regular  Rate and Rhythm, there is systolic murmur present best heard over the base of the heart,    LUNGS: Respirations are not laboured, Lungs are having decreased breath sounds in the lung bases to auscultation without crepitations or Wheezing   ABDOMEN: Soft , there is  tenderness , Bowel Sounds are  Positive   LOWER LIMBS: no Pedal Edema   Bilaterally   CNS:   Alert, I think oriented but unable to understand her given the language barrier moving all the Four Limbs        Data   Data reviewed today: I reviewed all medications, new labs and imaging results over the last 24 hours. I personally reviewed the chest x-ray and CT scan findings as mentioned above    Recent Labs   Lab 07/01/21  1242   WBC 4.0   HGB 11.0*   MCV 94      *   POTASSIUM 5.4*   CHLORIDE 99   CO2 23   BUN 35*   CR 1.48*   ANIONGAP 7   CHRIS 8.4*   *   ALBUMIN 2.7*   PROTTOTAL 7.2   BILITOTAL 0.4   ALKPHOS 102   ALT 66*   AST 35     Most Recent 3 CBC's:  Recent Labs   Lab Test 07/01/21  1242 06/22/21  1507 08/01/14  0940   WBC 4.0 5.0 6.9   HGB 11.0* 11.7 9.4*   MCV 94 97 95    219 196     Most Recent 3 BMP's:  Recent Labs   Lab Test 07/01/21  1242 06/22/21  1507 07/31/14  0718   * 137 138   POTASSIUM 5.4* 4.4 4.6   CHLORIDE 99 101 109   CO2 23 25 22   BUN 35* 21 28   CR 1.48* 1.15* 1.14*    ANIONGAP 7 11 7   CHRIS 8.4* 9.1 8.8   * 151* 155*     Most Recent 2 LFT's:  Recent Labs   Lab Test 07/01/21  1242 06/22/21  1507   AST 35 63*   ALT 66* 52*   ALKPHOS 102 79   BILITOTAL 0.4 0.2     Most Recent 3 INR's:No lab results found.  Most Recent 3 Troponin's:  Recent Labs   Lab Test 07/27/14  1608   TROPI <0.012     Most Recent ABG:No lab results found.  Recent Results (from the past 24 hour(s))   XR Chest Port 1 View    Narrative    CHEST ONE VIEW PORTABLE   7/1/2021 1:05 PM     HISTORY:  Shortness of breath.    COMPARISON: 6/22/2021.      Impression    IMPRESSION: Hazy infiltrates in both lower lungs are new since the  previous exam. These findings are nonspecific, and may be related to  infection and/or edema. No other significant interval change. Heart  size appears stable. Pulmonary vascularity is within normal limits.  Pleural calcification is again noted bilaterally, greater on the left.  Scattered calcified granulomas in both lungs. Aortic calcification.     MARCO A BUNCH MD          SYSTEM ID:  [id number]   CT Chest Pulmonary Embolism w Contrast    Narrative    CT CHEST PULMONARY EMBOLISM WITH CONTRAST  7/1/2021 2:26 PM    CLINICAL HISTORY: Dyspnea. Positive d-dimer.    TECHNIQUE: CT angiogram chest during arterial phase injection IV  contrast. 2D and 3D MIP reconstructions were performed by the CT  technologist. Dose reduction techniques were used.     CONTRAST: 64 mL Isovue-[id number]    COMPARISON: None.    FINDINGS:  ANGIOGRAM CHEST: Significant respiratory motion artifact is present.  No large central pulmonary emboli are appreciated, but peripheral  arteries are obscured by artifact. Thoracic aorta is negative for  dissection. No CT evidence of right heart strain.    LUNGS AND PLEURA: Calcified granulomas are noted within the lungs  bilaterally along with dense calcified pleural plaque anterior upper  lobe and lateral left upper lobe and lingula. No pleural effusions. No  obvious infiltrate  or consolidation allowing for significant  respiratory motion artifact.    MEDIASTINUM/AXILLAE: Heart is normal in size. No pericardial fluid.  Esophagus is unremarkable. No significant lymph node enlargement  allowing for motion artifact.    UPPER ABDOMEN: Normal.    MUSCULOSKELETAL: Degenerative spine changes.      Impression    IMPRESSION:  1.  Limited exam due to respiratory motion artifact. No definite  evidence of central pulmonary emboli. Thoracic aorta is unremarkable.    2.  Evidence of old granulomatous disease with large calcified nodules  in each lung and calcified pleural plaque involving the upper lobes as  described. Findings could reflect previous asbestos exposure.    VIANEY OCHOA MD          SYSTEM ID:  [id number]

## 2021-07-01 NOTE — ED PROVIDER NOTES
History   Chief Complaint:  Shortness of Breath       HPI   Nhi Perry is a 89 year old female with history of cystic fibrosis, hypertension, and diabetes who presents with shortness of breath.  She was seen here on 6/22 by Dr. See and subsequently Covid test returned positive.  Patient's granddaughter reports that since then she has been getting weaker more short of breath with worsening cough.  The patient's granddaughter reports that her grandmother has been taking in less fluids lately. She has also been getting weaker and her oxygen levels have been decreasing intermittently.  She lives with her son and daughter who also tested positive for COVID. Her son is currently in the ICU due to his COVID symptoms and his daughter notes that she is still weak and fatigued from her COVID symptoms which has made it difficult to care for her mother.   They have not noted any falls at home.  The patient herself denies any pain.    Review of Systems   All other systems reviewed and are negative.    Allergies:  Amoxicillin    Medications:  Albuterol inhaler  Aspirin   Bisacodyl   Cetirizine HCl  Citalopram Hydrobromide  Dexamethasone   Dextromethorphan   Ergocalciferol   Gabapentin   Norco  Levothyroxine Sodium  Lisinopril   Metformin  Metoprolol   Olopatadine HCl  Oxycodone  Percocet  Senna-docusate  Simvastatin  Memantine  Mometasone  Celecoxib  Donepezil  Cyclobenzaprine  Zolpidem      Past Medical History:    Arthritis  ASCVD  Diabtetes  Hypertension  Hypothyroidism  Osteoporosis  Retina disorder  Eczema  Insomnia  SCCA  Degenerative Joint Disease  Nephrolithiasis  Depression  Spinal Stenosis  Cataract  Cystic fibrosis    Past Surgical History:    Carotid Endarterectomy  Cholecystomy  Cataract removal  Genitourinary Surgery  Colonoscopy  Open reduction Internal fixation hip nailing    Family History:    Cancer, Father  Alzheimer Disease, Mother  Hypertension, Mother    Social History:  PCP: Physician No  "Ref-Primary  Presents to the ED alone    Physical Exam     Patient Vitals for the past 24 hrs:   BP Temp Temp src Pulse Resp SpO2   07/01/21 1500 100/50 -- -- 89 -- 99 %   07/01/21 1445 107/58 -- -- 89 -- 98 %   07/01/21 1400 103/55 -- -- (!) 48 -- 94 %   07/01/21 1345 100/61 -- -- 96 -- 90 %   07/01/21 1330 129/84 -- -- 95 -- 96 %   07/01/21 1315 (!) 110/99 -- -- 92 -- 98 %   07/01/21 1300 125/59 -- -- 91 -- 94 %   07/01/21 1245 103/47 -- -- 91 -- 99 %   07/01/21 1213 128/58 98.6  F (37  C) Oral 90 24 96 %       Physical Exam  General: Elderly appearing female.  Mildly confused.  Head:  Scalp is NC/AT  Eyes:  Conjunctiva normal, PERRL  ENT:  The external nose and ears are normal.   Neck:  Normal range of motion without rigidity.  CV:  Regular rate and rhythm    No pathologic murmur, rubs, or gallops.  Resp:  Coarse breath sounds bilaterally with crackles and wheezes.  No rhonchi or stridor.    Non-labored, no retractions or accessory muscle use  Abdomen: Abdomen is soft, no distension, no tenderness, no masses. No CVA tenderness.  MS:  No lower extremity edema or asymmetric calf swelling. Normal ROM in all joints without effusions.    No midline cervical, thoracic, or lumbar tenderness  Skin:  Warm and dry, No rash or lesions noted. 2+ peripheral pulses in all extremities  Neuro: Oriented to self, not to place or time.  Moves all extremities.  No facial asymmetry.  Speech not slurred.  Psych:  Awake.  Confused.  Able to answer basic questions but tells me that she lives at \"[facility]\" when asked where she lives    Emergency Department Course     ECG:  ECG taken at 1340, ECG read at 1354  Normal sinus rhythm  Normal ECG  Rate 97 bpm. MT interval 172 ms. QRS duration 72 ms. QT/QTc 368/467 ms. P-R-T axes 55 22 90.    Imaging:  Chest  XR Port 1 view:  IMPRESSION: Hazy infiltrates in both lower lungs are new since the   previous exam. These findings are nonspecific, and may be related to   infection " and/or edema. No other significant interval change. Heart   size appears stable. Pulmonary vascularity is within normal limits.   Pleural calcification is again noted bilaterally, greater on the left.   Scattered calcified granulomas in both lungs. Aortic calcification.   Reading per radiology    Chest  Ct Pulmonary Embolism w Contrast:  IMPRESSION:   1.  Limited exam due to respiratory motion artifact. No definite   evidence of central pulmonary emboli. Thoracic aorta is unremarkable.   2.  Evidence of old granulomatous disease with large calcified nodules   in each lung and calcified pleural plaque involving the upper lobes as   described. Findings could reflect previous asbestos exposure.  Reading per radiology    Laboratory:  CBC: WBC 4.0, HGB 11.0 (L),    CMP: Glucose 218 (H),  (L), Potassium 5.4 (H), BUN 35 (H), Calcium 8.4 (L), Albumin 2.7 (L), ALT 66 (H) o/w WNL Creatinine: 1.48 (H)   D-dimer: 1.7 (H)  BNP: 315  Blood gas venous and oxyhgb: Ph venous 7.31 (L), PO2 Venous 22 (L)  TSH with Free T4 Reflex: 1.08    Emergency Department Course:  Reviewed:  I reviewed the patient's nursing notes, vitals, past medical records, Care Everywhere.     Assessments:  1159 I performed an assessment and examination of the patient as noted above.      1335 I spoke with Stephany, the patients granddaughter.     1340 Findings and plan explained to the Patient who consents to admission. Discussed the patient with Dr. Arteaga, who will admit the patient to a medical bed for further monitoring, evaluation, and treatment.     Consults:  1330 I spoke with Dr. Odom regarding this patient.    1459  I spoke with Dr. Chandler webster regarding admitting this patient.      Interventions:  1301 Duoneb 3 ml nebulization  1302 Duoneb 3 ml nebulization  1307 Decadron 6 mg IV  1357  mL IV Bolus    Disposition:  The patient was admitted to the hospital under the care of Dr. Chandler webster.       Impression & Plan   Medical  Decision Making:  Nhi Perry is a 89 year old female with history of diabetes, hypothyroidism, cystic fibrosis who presents for worsening cough, shortness of breath, and confusion following diagnosis of COVID-19 infection on 6/22.  X-ray reveals evidence of bilateral pneumonia consistent with likely COVID-19 pneumonia.  Afebrile normal white blood cell count, doubt coexisting bacterial infection.  Dimer elevated CT of the chest somewhat limited secondary to motion but shows no definite PEs, shows old granulomatous disease.  BNP normal no evidence of congestive heart failure.  Her blood work is notable for mild hyponatremia, mild hyperkalemia, and mildly elevated kidney function all consistent with likely prerenal dehydration and she does appear somewhat volume down and dry on exam.  Hyperkalemia is quite mild, no EKG changes, but do not think further emergent treatment is indicated at this time will likely improve with fluids.  She was given 1 L of fluid as well as Decadron and duonebs.  She is confused likely due to infectious encephalopathy and does not have any focal neuro deficits or signs of trauma and I doubt structural neurologic problem such as brain bleed or stroke.  EKG is normal.   She has no complaints of pain and no examination findings to suggest other infectious source, trauma etc.    She is not hypoxic or hypercapnic here not in respiratory distress requiring BiPAP.  Will be admitted to the hospital for further evaluation and monitoring, likely treatment with remdesivir.  Discussed with hospitalist who agrees to accept the patient.    Covid-19  Nhi Perry was evaluated during a global COVID-19 pandemic, which necessitated consideration that the patient might be at risk for infection with the SARS-CoV-2 virus that causes COVID-19.   Applicable protocols for evaluation were followed during the patient's care.   COVID-19 was considered as part of the patient's evaluation. The plan for  testing is:  a test was obtained at prior visit    Diagnosis:    ICD-10-CM    1. Pneumonia due to 2019 novel coronavirus  U07.1     J12.82    2. Generalized muscle weakness  M62.81    3. Infectious encephalopathy  G93.49     B99.9    4. Hyperkalemia  E87.5    5. Hyponatremia  E87.1        Discharge Medications:  New Prescriptions    No medications on file       Scribe Disclosure:  I, DEEPAK HAN, am serving as a scribe at 11:59 AM on 7/1/2021 to document services personally performed by Arvind Nickerson, * based on my observations and the provider's statements to me.        Arvind Nickerson PA-C  07/01/21 [id number]

## 2021-07-01 NOTE — ED TRIAGE NOTES
"Presents via ems, dx with covid 1 week ago, hx of cystic fibrosis.  Has increased shortness of breath and \"I'm just not getting better\" family reports patient is hallucinating  "

## 2021-07-01 NOTE — ED NOTES
[facility]  ED Nurse Handoff Report    Nhi Perry is a 89 year old female   ED Chief complaint: Shortness of Breath  . ED Diagnosis:   Final diagnoses:   Pneumonia due to 2019 novel coronavirus   Generalized muscle weakness   Infectious encephalopathy   Hyperkalemia   Hyponatremia     Allergies:   Allergies   Allergen Reactions     Seasonal Allergies        Code Status: DNR / DNI  Activity level - Baseline/Home:  Assist X 2. Activity Level - Current:   Assist X 2. Lift room needed: No. Bariatric: No   Needed: Yes , Scottish  Isolation: Yes. Infection: Not Applicable  COVID positive.     Vital Signs:   Vitals:    07/01/21 1345 07/01/21 1400 07/01/21 1445 07/01/21 1500   BP: 100/61 103/55 107/58 100/50   Pulse: 96 (!) 48 89 89   Resp:       Temp:       TempSrc:       SpO2: 90% 94% 98% 99%       Cardiac Rhythm:  ,      Pain level:    Patient confused: Yes. Patient Falls Risk: Yes.   Elimination Status: has not voided   Patient Report - Initial Complaint: confusion, shortness of breath, cough, covid. Focused Assessment:   Neurological Cognitive - Cognitive/Neuro/Behavioral WDL: .WDL except; orientation  Level of Consciousness: confused  Orientation: disoriented to; person; place; time  Follows Commands: yes  Speech: JOSE (unable to assess) (speaks Mexican) Mood/Behavior: behavior appropriate to situation   Pupils (CN II) - Pupil PERRLA: yes  WB       12:00 MHCD General Appearance - ADL Assessment (WDL): WDL  Speech (WDL): WDL   Psychiatric Symptoms / Stressors - Mood/Behavior: behavior appropriate to situation  WB     12:00 Respiratory Respiratory - Respiratory WDL: .WDL except; cough; breath sounds  Breath Sounds: All Fields  Cough Frequency: frequent  Cough Type: loose   Respiratory Assistance - Oxygen Therapy Device: nasal cannula   Head To Toe Assessment - All Lung Fields Breath Sounds: Posterior:; diminished; coarse           Tests Performed:   Labs Ordered and Resulted from Time of  ED Arrival Up to the Time of Departure from the ED   CBC WITH PLATELETS DIFFERENTIAL - Abnormal; Notable for the following components:       Result Value    RBC Count 3.59 (*)     Hemoglobin 11.0 (*)     Hematocrit 33.9 (*)     Absolute Lymphocytes 0.6 (*)     All other components within normal limits   COMPREHENSIVE METABOLIC PANEL - Abnormal; Notable for the following components:    Sodium 129 (*)     Potassium 5.4 (*)     Glucose 218 (*)     Urea Nitrogen 35 (*)     Creatinine 1.48 (*)     GFR Estimate 30 (*)     GFR Estimate If Black 34 (*)     Calcium 8.4 (*)     Albumin 2.7 (*)     ALT 66 (*)     All other components within normal limits   BLOOD GAS VENOUS AND OXYHGB - Abnormal; Notable for the following components:    Ph Venous 7.31 (*)     PO2 Venous 22 (*)     All other components within normal limits   D DIMER QUANTITATIVE - Abnormal; Notable for the following components:    D Dimer 1.7 (*)     All other components within normal limits   NT PROBNP INPATIENT   TSH WITH FREE T4 REFLEX     CT Chest Pulmonary Embolism w Contrast   Final Result   IMPRESSION:   1.  Limited exam due to respiratory motion artifact. No definite   evidence of central pulmonary emboli. Thoracic aorta is unremarkable.      2.  Evidence of old granulomatous disease with large calcified nodules   in each lung and calcified pleural plaque involving the upper lobes as   described. Findings could reflect previous asbestos exposure.      VIANEY OCHOA MD             SYSTEM ID:  [id number]      XR Chest Port 1 View   Preliminary Result   IMPRESSION: Hazy infiltrates in both lower lungs are new since the   previous exam. These findings are nonspecific, and may be related to   infection and/or edema. No other significant interval change. Heart   size appears stable. Pulmonary vascularity is within normal limits.   Pleural calcification is again noted bilaterally, greater on the left.   Scattered calcified granulomas in both lungs. Aortic  calcification.           . Abnormal Results: see EMR.   Treatments provided:   Medications   0.9% sodium chloride BOLUS (has no administration in time range)   ipratropium - albuterol 0.5 mg/2.5 mg/3 mL (DUONEB) neb solution 3 mL (3 mLs Nebulization Given 7/1/21 1302)   ipratropium - albuterol 0.5 mg/2.5 mg/3 mL (DUONEB) neb solution 3 mL (3 mLs Nebulization Given 7/1/21 1301)   dexamethasone PF (DECADRON) injection 6 mg (6 mg Intravenous Given 7/1/21 1307)   0.9% sodium chloride BOLUS (500 mLs Intravenous New Bag 7/1/21 1357)   CT Scan Flush (83 mLs Intravenous Given 7/1/21 1412)   iopamidol (ISOVUE-370) solution 500 mL (64 mLs Intravenous Given 7/1/21 1412)       Family Comments: arnav has been contacted  OBS brochure/video discussed/provided to patient:  N/A  ED Medications:   Medications   0.9% sodium chloride BOLUS (has no administration in time range)   ipratropium - albuterol 0.5 mg/2.5 mg/3 mL (DUONEB) neb solution 3 mL (3 mLs Nebulization Given 7/1/21 1302)   ipratropium - albuterol 0.5 mg/2.5 mg/3 mL (DUONEB) neb solution 3 mL (3 mLs Nebulization Given 7/1/21 1301)   dexamethasone PF (DECADRON) injection 6 mg (6 mg Intravenous Given 7/1/21 1307)   0.9% sodium chloride BOLUS (500 mLs Intravenous New Bag 7/1/21 1357)   CT Scan Flush (83 mLs Intravenous Given 7/1/21 1412)   iopamidol (ISOVUE-370) solution 500 mL (64 mLs Intravenous Given 7/1/21 1412)     Drips infusing:  No  For the majority of the shift, the patient's behavior Green. Interventions performed were NA.    Sepsis treatment initiated: No     Patient tested for COVID 19 prior to admission: NO positive covid 1 week ago    ED Nurse Name/Phone Number: DEUCE CAPELLAN RN,   3:07 PM    RECEIVING UNIT ED HANDOFF REVIEW    Above ED Nurse Handoff Report was reviewed: Yes  Reviewed by: Claudia Zuleta RN on July 1, 2021 at 3:59 PM

## 2021-07-01 NOTE — PLAN OF CARE
To Do:  End of Shift Summary  For vital signs and complete assessments, please see documentation flowsheets.     Pertinent assessments: VSS. Unable to fully assess orientation. Israeli speaking, forgetful. Repetitively asks questions via . Intermittent good non-productive cough. Blanchable redness on coccyx and heels. Incontinent. Purewick in place.    Major Shift Events: Admitted to floor.    Treatment Plan: Remdesivir, Decadron, and Lovenox.    Bedside Nurse: Claudia Zuleta RN

## 2021-07-01 NOTE — ED PROVIDER NOTES
Emergency Department Attending Supervision Note  7/1/2021  1:34 PM      I evaluated this patient in conjunction with Arvind Nickerson PA-C.    This is a 89-year-old Citizen of [place]-speaking patient with recent diagnosis of COVID-19 infection, who presents with increased confusion.  History is quite limited, as the patient is confused, and Citizen of [place]-speaking.  Attempts with  phone were unfruitful.  Family had further discussion about the patient's symptoms with Arvind Nickerson.    On my exam, she is afebrile, with oxygen saturation 94% on room air.  She seems to be confused, but has a nonfocal neurological exam.  I evaluated the patient with the  in the room.  She seems to be somewhat confused, and does not understand what Covid infection means, and confuses it with tuberculosis.  Otherwise, she has dry mucous membranes.  No peripheral edema.  Lungs are clear, but she has poor effort.  No respiratory distress or tachypnea.  Heart is normal rate regular rhythm no murmurs rubs or gallops.  Abdomen is soft and nontender.    Results:  Labs Ordered and Resulted from Time of ED Arrival Up to the Time of Departure from the ED   CBC WITH PLATELETS DIFFERENTIAL - Abnormal; Notable for the following components:       Result Value    RBC Count 3.59 (*)     Hemoglobin 11.0 (*)     Hematocrit 33.9 (*)     Absolute Lymphocytes 0.6 (*)     All other components within normal limits   COMPREHENSIVE METABOLIC PANEL - Abnormal; Notable for the following components:    Sodium 129 (*)     Potassium 5.4 (*)     Glucose 218 (*)     Urea Nitrogen 35 (*)     Creatinine 1.48 (*)     GFR Estimate 30 (*)     GFR Estimate If Black 34 (*)     Calcium 8.4 (*)     Albumin 2.7 (*)     ALT 66 (*)     All other components within normal limits   BLOOD GAS VENOUS AND OXYHGB - Abnormal; Notable for the following components:    Ph Venous 7.31 (*)     PO2 Venous 22 (*)     All other components within normal limits   D DIMER QUANTITATIVE - Abnormal;  Notable for the following components:    D Dimer 1.7 (*)     All other components within normal limits   NT PROBNP INPATIENT   TSH WITH FREE T4 REFLEX     CT Chest Pulmonary Embolism w Contrast   Final Result   IMPRESSION:   1.  Limited exam due to respiratory motion artifact. No definite   evidence of central pulmonary emboli. Thoracic aorta is unremarkable.      2.  Evidence of old granulomatous disease with large calcified nodules   in each lung and calcified pleural plaque involving the upper lobes as   described. Findings could reflect previous asbestos exposure.      VIANEY OCHOA MD             SYSTEM ID:  [id number]      XR Chest Port 1 View   Preliminary Result   IMPRESSION: Hazy infiltrates in both lower lungs are new since the   previous exam. These findings are nonspecific, and may be related to   infection and/or edema. No other significant interval change. Heart   size appears stable. Pulmonary vascularity is within normal limits.   Pleural calcification is again noted bilaterally, greater on the left.   Scattered calcified granulomas in both lungs. Aortic calcification.         ED course:    My impression is 89-year-old female with history of recently diagnosed COVID-19 infection, presenting with dehydration, confusion, and worsening hazy infiltrates on chest x-ray, overall consistent with worsening COVID-19 infection.  Plan for IV Decadron, admission to the hospital for further supportive cares.        Diagnosis    ICD-10-CM    1. Pneumonia due to 2019 novel coronavirus  U07.1     J12.82    2. Generalized muscle weakness  M62.81    3. Infectious encephalopathy  G93.49     B99.9    4. Hyperkalemia  E87.5    5. Hyponatremia  E87.1          MD Ilene Webb Tracy Lynn, MD  07/01/21 1533

## 2021-07-02 LAB
ANION GAP SERPL CALCULATED.3IONS-SCNC: 4 MMOL/L (ref 3–14)
BUN SERPL-MCNC: 35 MG/DL (ref 7–30)
CALCIUM SERPL-MCNC: 8.2 MG/DL (ref 8.5–10.1)
CHLORIDE SERPL-SCNC: 109 MMOL/L (ref 94–109)
CO2 SERPL-SCNC: 22 MMOL/L (ref 20–32)
CREAT SERPL-MCNC: 1.06 MG/DL (ref 0.52–1.04)
CRP SERPL-MCNC: 41.8 MG/L (ref 0–8)
D DIMER PPP FEU-MCNC: 1.1 UG/ML FEU (ref 0–0.5)
ERYTHROCYTE [DISTWIDTH] IN BLOOD BY AUTOMATED COUNT: 12.4 % (ref 10–15)
FIBRINOGEN PPP-MCNC: 517 MG/DL (ref 200–420)
GFR SERPL CREATININE-BSD FRML MDRD: 44 ML/MIN/{1.73_M2}
GLUCOSE BLDC GLUCOMTR-MCNC: 211 MG/DL (ref 70–99)
GLUCOSE BLDC GLUCOMTR-MCNC: 241 MG/DL (ref 70–99)
GLUCOSE BLDC GLUCOMTR-MCNC: 253 MG/DL (ref 70–99)
GLUCOSE BLDC GLUCOMTR-MCNC: 275 MG/DL (ref 70–99)
GLUCOSE BLDC GLUCOMTR-MCNC: 384 MG/DL (ref 70–99)
GLUCOSE SERPL-MCNC: 161 MG/DL (ref 70–99)
HCT VFR BLD AUTO: 28.7 % (ref 35–47)
HGB BLD-MCNC: 9.5 G/DL (ref 11.7–15.7)
MCH RBC QN AUTO: 30.6 PG (ref 26.5–33)
MCHC RBC AUTO-ENTMCNC: 33.1 G/DL (ref 31.5–36.5)
MCV RBC AUTO: 93 FL (ref 78–100)
PLATELET # BLD AUTO: 184 10E9/L (ref 150–450)
POTASSIUM SERPL-SCNC: 5.1 MMOL/L (ref 3.4–5.3)
RBC # BLD AUTO: 3.1 10E12/L (ref 3.8–5.2)
SODIUM SERPL-SCNC: 135 MMOL/L (ref 133–144)
WBC # BLD AUTO: 4.2 10E9/L (ref 4–11)

## 2021-07-02 PROCEDURE — 86140 C-REACTIVE PROTEIN: CPT | Performed by: INTERNAL MEDICINE

## 2021-07-02 PROCEDURE — 36415 COLL VENOUS BLD VENIPUNCTURE: CPT | Performed by: INTERNAL MEDICINE

## 2021-07-02 PROCEDURE — 99233 SBSQ HOSP IP/OBS HIGH 50: CPT | Performed by: HOSPITALIST

## 2021-07-02 PROCEDURE — 258N000003 HC RX IP 258 OP 636: Performed by: INTERNAL MEDICINE

## 2021-07-02 PROCEDURE — 999N001017 HC STATISTIC GLUCOSE BY METER IP

## 2021-07-02 PROCEDURE — 85379 FIBRIN DEGRADATION QUANT: CPT | Performed by: INTERNAL MEDICINE

## 2021-07-02 PROCEDURE — 80048 BASIC METABOLIC PNL TOTAL CA: CPT | Performed by: INTERNAL MEDICINE

## 2021-07-02 PROCEDURE — 250N000012 HC RX MED GY IP 250 OP 636 PS 637: Performed by: INTERNAL MEDICINE

## 2021-07-02 PROCEDURE — 250N000009 HC RX 250: Performed by: INTERNAL MEDICINE

## 2021-07-02 PROCEDURE — 85027 COMPLETE CBC AUTOMATED: CPT | Performed by: INTERNAL MEDICINE

## 2021-07-02 PROCEDURE — 250N000011 HC RX IP 250 OP 636: Performed by: INTERNAL MEDICINE

## 2021-07-02 PROCEDURE — 250N000012 HC RX MED GY IP 250 OP 636 PS 637: Performed by: HOSPITALIST

## 2021-07-02 PROCEDURE — 120N000001 HC R&B MED SURG/OB

## 2021-07-02 PROCEDURE — 85384 FIBRINOGEN ACTIVITY: CPT | Performed by: INTERNAL MEDICINE

## 2021-07-02 PROCEDURE — 250N000013 HC RX MED GY IP 250 OP 250 PS 637: Performed by: HOSPITALIST

## 2021-07-02 RX ORDER — GABAPENTIN 300 MG/1
600 CAPSULE ORAL 3 TIMES DAILY
Status: DISCONTINUED | OUTPATIENT
Start: 2021-07-02 | End: 2021-07-05 | Stop reason: HOSPADM

## 2021-07-02 RX ORDER — GABAPENTIN 300 MG/1
600 CAPSULE ORAL 3 TIMES DAILY
COMMUNITY

## 2021-07-02 RX ORDER — CYCLOBENZAPRINE HCL 5 MG
10 TABLET ORAL DAILY
Status: DISCONTINUED | OUTPATIENT
Start: 2021-07-02 | End: 2021-07-05 | Stop reason: HOSPADM

## 2021-07-02 RX ORDER — HYDROCODONE BITARTRATE AND ACETAMINOPHEN 5; 325 MG/1; MG/1
1 TABLET ORAL
Status: ON HOLD | COMMUNITY
End: 2021-07-15

## 2021-07-02 RX ORDER — LEVOTHYROXINE SODIUM 88 UG/1
88 TABLET ORAL DAILY
Status: DISCONTINUED | OUTPATIENT
Start: 2021-07-02 | End: 2021-07-05 | Stop reason: HOSPADM

## 2021-07-02 RX ORDER — AMOXICILLIN 250 MG
2 CAPSULE ORAL 2 TIMES DAILY PRN
Status: DISCONTINUED | OUTPATIENT
Start: 2021-07-02 | End: 2021-07-05 | Stop reason: HOSPADM

## 2021-07-02 RX ORDER — FLUTICASONE PROPIONATE 50 MCG
2 SPRAY, SUSPENSION (ML) NASAL DAILY PRN
COMMUNITY

## 2021-07-02 RX ORDER — METOPROLOL SUCCINATE 25 MG/1
25 TABLET, EXTENDED RELEASE ORAL DAILY
Status: DISCONTINUED | OUTPATIENT
Start: 2021-07-02 | End: 2021-07-05 | Stop reason: HOSPADM

## 2021-07-02 RX ORDER — FLUOCINOLONE ACETONIDE 0.25 MG/G
CREAM TOPICAL 2 TIMES DAILY
COMMUNITY

## 2021-07-02 RX ORDER — LISINOPRIL 40 MG/1
40 TABLET ORAL DAILY
Status: ON HOLD | COMMUNITY
End: 2021-08-09

## 2021-07-02 RX ORDER — CITALOPRAM HYDROBROMIDE 20 MG/1
20 TABLET ORAL DAILY
Status: DISCONTINUED | OUTPATIENT
Start: 2021-07-02 | End: 2021-07-05 | Stop reason: HOSPADM

## 2021-07-02 RX ORDER — AMOXICILLIN 250 MG
2 CAPSULE ORAL 2 TIMES DAILY PRN
COMMUNITY

## 2021-07-02 RX ORDER — LEVOTHYROXINE SODIUM 88 UG/1
88 TABLET ORAL DAILY
COMMUNITY

## 2021-07-02 RX ORDER — METOPROLOL SUCCINATE 25 MG/1
25 TABLET, EXTENDED RELEASE ORAL DAILY
COMMUNITY

## 2021-07-02 RX ORDER — OLOPATADINE HYDROCHLORIDE 1 MG/ML
1 SOLUTION/ DROPS OPHTHALMIC 2 TIMES DAILY PRN
Status: DISCONTINUED | OUTPATIENT
Start: 2021-07-02 | End: 2021-07-05 | Stop reason: HOSPADM

## 2021-07-02 RX ORDER — FLUTICASONE PROPIONATE 220 UG/1
2 AEROSOL, METERED RESPIRATORY (INHALATION) 2 TIMES DAILY
COMMUNITY

## 2021-07-02 RX ORDER — FLUTICASONE PROPIONATE 50 MCG
2 SPRAY, SUSPENSION (ML) NASAL DAILY PRN
Status: DISCONTINUED | OUTPATIENT
Start: 2021-07-02 | End: 2021-07-05 | Stop reason: HOSPADM

## 2021-07-02 RX ORDER — SIMVASTATIN 40 MG
40 TABLET ORAL DAILY
Status: DISCONTINUED | OUTPATIENT
Start: 2021-07-02 | End: 2021-07-05 | Stop reason: HOSPADM

## 2021-07-02 RX ORDER — DONEPEZIL HYDROCHLORIDE 5 MG/1
10 TABLET, FILM COATED ORAL AT BEDTIME
Status: DISCONTINUED | OUTPATIENT
Start: 2021-07-02 | End: 2021-07-05 | Stop reason: HOSPADM

## 2021-07-02 RX ORDER — CITALOPRAM HYDROBROMIDE 20 MG/1
20 TABLET ORAL DAILY
COMMUNITY

## 2021-07-02 RX ORDER — MEMANTINE HYDROCHLORIDE 28 MG/1
28 CAPSULE, EXTENDED RELEASE ORAL DAILY
Status: DISCONTINUED | OUTPATIENT
Start: 2021-07-02 | End: 2021-07-05 | Stop reason: HOSPADM

## 2021-07-02 RX ORDER — ZOLPIDEM TARTRATE 5 MG/1
5 TABLET ORAL
Status: DISCONTINUED | OUTPATIENT
Start: 2021-07-02 | End: 2021-07-05 | Stop reason: HOSPADM

## 2021-07-02 RX ORDER — LISINOPRIL 40 MG/1
40 TABLET ORAL DAILY
Status: DISCONTINUED | OUTPATIENT
Start: 2021-07-02 | End: 2021-07-05 | Stop reason: HOSPADM

## 2021-07-02 RX ADMIN — DEXAMETHASONE 6 MG: 2 TABLET ORAL at 09:34

## 2021-07-02 RX ADMIN — REMDESIVIR 100 MG: 100 INJECTION, POWDER, LYOPHILIZED, FOR SOLUTION INTRAVENOUS at 17:09

## 2021-07-02 RX ADMIN — CITALOPRAM HYDROBROMIDE 20 MG: 20 TABLET ORAL at 15:21

## 2021-07-02 RX ADMIN — METOPROLOL SUCCINATE 25 MG: 25 TABLET, EXTENDED RELEASE ORAL at 15:21

## 2021-07-02 RX ADMIN — SIMVASTATIN 40 MG: 40 TABLET, FILM COATED ORAL at 15:21

## 2021-07-02 RX ADMIN — GABAPENTIN 600 MG: 300 CAPSULE ORAL at 15:21

## 2021-07-02 RX ADMIN — INSULIN ASPART 3 UNITS: 100 INJECTION, SOLUTION INTRAVENOUS; SUBCUTANEOUS at 13:38

## 2021-07-02 RX ADMIN — MEMANTINE HYDROCHLORIDE 28 MG: 28 CAPSULE, EXTENDED RELEASE ORAL at 15:21

## 2021-07-02 RX ADMIN — INSULIN ASPART 3 UNITS: 100 INJECTION, SOLUTION INTRAVENOUS; SUBCUTANEOUS at 15:13

## 2021-07-02 RX ADMIN — GABAPENTIN 600 MG: 300 CAPSULE ORAL at 21:12

## 2021-07-02 RX ADMIN — LISINOPRIL 40 MG: 40 TABLET ORAL at 15:21

## 2021-07-02 RX ADMIN — CYCLOBENZAPRINE HYDROCHLORIDE 10 MG: 5 TABLET, FILM COATED ORAL at 15:21

## 2021-07-02 RX ADMIN — INSULIN GLARGINE 10 UNITS: 100 INJECTION, SOLUTION SUBCUTANEOUS at 21:16

## 2021-07-02 RX ADMIN — ENOXAPARIN SODIUM 40 MG: 40 INJECTION SUBCUTANEOUS at 21:18

## 2021-07-02 RX ADMIN — INSULIN ASPART 1 UNITS: 100 INJECTION, SOLUTION INTRAVENOUS; SUBCUTANEOUS at 09:33

## 2021-07-02 RX ADMIN — LEVOTHYROXINE SODIUM 88 MCG: 0.09 TABLET ORAL at 21:13

## 2021-07-02 RX ADMIN — DONEPEZIL HYDROCHLORIDE 10 MG: 5 TABLET ORAL at 21:11

## 2021-07-02 RX ADMIN — SODIUM CHLORIDE 50 ML: 9 INJECTION, SOLUTION INTRAVENOUS at 17:28

## 2021-07-02 ASSESSMENT — ACTIVITIES OF DAILY LIVING (ADL)
ADLS_ACUITY_SCORE: 35
ADLS_ACUITY_SCORE: 31
ADLS_ACUITY_SCORE: 31

## 2021-07-02 NOTE — PLAN OF CARE
Alert, confused, oriented to self only. Has been yelling most  of the shift. Seeing family at the window. Keeps saying she wants to go home. VSS. Denies pain. Weaned of oxygen. Sats are in mid 90s on RA. Lungs sounds diminished, wheezes expiratory, nonproductive cough. On ISS. Declined eating lunch, family sent her home food. Pure wick in place. Generalized weakness, lift. Bed alarm on.

## 2021-07-02 NOTE — PROGRESS NOTES
[facility]    Hospitalist Progress Note    Date of Service (when I saw the patient): 07/02/2021  Provider:  Alexander Espino MD   Text Page  7am - 6PM       Assessment & Plan   Nhi Perry is a 89 year old female admitted on 7/1/2021. She became Covid +1-week before admission and came in with shortness of breath and infiltrate seen on chest x-ray bilaterally in her lungs     COVID-19 infection  Bilateral pneumonia due to COVID-19 infection  Remdesivir and dexamethasone will be started as per protocol  Anticoagulation in the form of enoxaparin  We will monitor oxygen needs in the hospital in a patient with shortness of breath     Metabolic encephalopathy  Probably on the basis of Covid pneumonia's versus residual cognitive impairment  We will monitor the patient closely and give Haldol should she become agitated    Acute hyperkalemia  BMP will be followed  At the present time potassium is not significantly elevated     Hypertension  Home dose of lisinopril 20 mg daily will be continued once dedication reconciliation is completed     2 diabetes mellitus  Metformin on hold.   Lantus 10 international unit(s) hs   Sliding scale insulin will be given instead during the hospitalization     Hypothyroidism  Thyroxine will be restarted once medication reconciliation is completed     Hypercholesteremia  Simvastatin will be continued    Remote history of tuberculosis, fully treated per patient report. This makes more sense to CT findings initially thought to be related to Asbestos.     Chronic normocytic normochromic anemia, likely from chronic disease.    Diet:  Diabetic diet  DVT Prophylaxis: Enoxaparin (Lovenox) SQ  Hawley Catheter: Not present  Central Lines: None  Code Status: Full Code    Disposition: Expected discharge in 2 days once not on O2.    Interval History   Patient is Cape Verdean-speaking, interview is conducted with help of RN who is originally from [place] also.  Patient states that  she is doing better and would like to go home, she is a still having dyspnea and frequent cough but her oxygen necessities improved within the last 24 hours.  She reports history of tuberculosis, fully treated many years ago in the [place].      -Data reviewed today: I reviewed all new labs and imaging results over the last 24 hours.     Physical Exam   Temp: 96.7  F (35.9  C) Temp src: Axillary BP: (Abnormal) 130/103 Pulse: 74   Resp: 18 SpO2: 92 % O2 Device: None (Room air) Oxygen Delivery: 1 LPM  There were no vitals filed for this visit.  Vital Signs with Ranges  Temp:  [96.5  F (35.8  C)-98.5  F (36.9  C)] 96.7  F (35.9  C)  Pulse:  [64-89] 74  Resp:  [16-24] 18  BP: ()/() 130/103  SpO2:  [92 %-99 %] 92 %  I/O last 3 completed shifts:  In: 240 [P.O.:240]  Out: -     GEN:  Alert, oriented x 3, appears comfortable, NAD.  HEENT:  Normocephalic/atraumatic, no scleral icterus, no nasal discharge, mouth moist.  CV:  Regular rate and rhythm, no murmur or JVD.  S1 + S2 noted, no S3 or S4.  LUNGS: Dyspnea, dry cough. Coarse sounds to auscultation bilaterally without rales/rhonchi/wheezing/retractions.  Symmetric chest rise on inhalation noted.  ABD:  Active bowel sounds, soft, non-tender/non-distended.  No rebound/guarding/rigidity.  EXT:  No edema or cyanosis.  No joint synovitis noted.  SKIN:  Dry to touch, no exanthems noted in the visualized areas.       Medications     - MEDICATION INSTRUCTIONS -         sodium chloride 0.9%  500 mL Intravenous Once     remdesivir  100 mg Intravenous Q24H    And     sodium chloride 0.9%  50 mL Intravenous Q24H     citalopram  20 mg Oral Daily     cyclobenzaprine  10 mg Oral Daily     dexamethasone  6 mg Oral Daily     donepezil  10 mg Oral At Bedtime     enoxaparin ANTICOAGULANT  40 mg Subcutaneous Q24H     fluticasone  2 puff Inhalation BID     gabapentin  600 mg Oral TID     insulin aspart  1-7 Units Subcutaneous TID AC     insulin aspart  1-5 Units  Subcutaneous At Bedtime     insulin glargine  10 Units Subcutaneous At Bedtime     levothyroxine  88 mcg Oral Daily     lisinopril  40 mg Oral Daily     memantine XR  28 mg Oral Daily     metoprolol succinate ER  25 mg Oral Daily     olopatadine  1 drop Both Eyes BID     simvastatin  40 mg Oral Daily     sodium chloride (PF)  3 mL Intracatheter Q8H       Data   Recent Labs   Lab 07/02/21  0753 07/01/21  1844 07/01/21  1242   WBC 4.2 4.2 4.0   HGB 9.5* 9.7* 11.0*   MCV 93 95 94    170 189   INR  --  1.14  --     130* 129*   POTASSIUM 5.1 5.7* 5.4*   CHLORIDE 109 105 99   CO2 22 20 23   BUN 35* 34* 35*   CR 1.06* 1.21* 1.48*   ANIONGAP 4 5 7   CHRIS 8.2* 7.9* 8.4*   * 279* 218*   ALBUMIN  --  2.4* 2.7*   PROTTOTAL  --  6.7* 7.2   BILITOTAL  --  0.2 0.4   ALKPHOS  --  94 102   ALT  --  57* 66*   AST  --  30 35   TROPI  --  <0.015  --        Recent Results (from the past 24 hour(s))   CT Chest Pulmonary Embolism w Contrast    Narrative    CT CHEST PULMONARY EMBOLISM WITH CONTRAST  7/1/2021 2:26 PM    CLINICAL HISTORY: Dyspnea. Positive d-dimer.    TECHNIQUE: CT angiogram chest during arterial phase injection IV  contrast. 2D and 3D MIP reconstructions were performed by the CT  technologist. Dose reduction techniques were used.     CONTRAST: 64 mL Isovue-370    COMPARISON: None.    FINDINGS:  ANGIOGRAM CHEST: Significant respiratory motion artifact is present.  No large central pulmonary emboli are appreciated, but peripheral  arteries are obscured by artifact. Thoracic aorta is negative for  dissection. No CT evidence of right heart strain.    LUNGS AND PLEURA: Calcified granulomas are noted within the lungs  bilaterally along with dense calcified pleural plaque anterior upper  lobe and lateral left upper lobe and lingula. No pleural effusions. No  obvious infiltrate or consolidation allowing for significant  respiratory motion artifact.    MEDIASTINUM/AXILLAE: Heart is normal in size. No pericardial  fluid.  Esophagus is unremarkable. No significant lymph node enlargement  allowing for motion artifact.    UPPER ABDOMEN: Normal.    MUSCULOSKELETAL: Degenerative spine changes.      Impression    IMPRESSION:  1.  Limited exam due to respiratory motion artifact. No definite  evidence of central pulmonary emboli. Thoracic aorta is unremarkable.    2.  Evidence of old granulomatous disease with large calcified nodules  in each lung and calcified pleural plaque involving the upper lobes as  described. Findings could reflect previous asbestos exposure.    VIANEY OCHOA MD          SYSTEM ID:  [id number]       Disclaimer: This note consists of symbols derived from keyboarding, dictation and/or voice recognition software. As a result, there may be errors in the script that have gone undetected. Please consider this when interpreting information found in this chart.

## 2021-07-02 NOTE — PLAN OF CARE
End of Shift Summary care from 4052-5081  For vital signs and complete assessments, please see documentation flowsheets.     Pertinent assessments: VSS 1L O2 for comfort, sats in mid to upper 90's.  Pt British speaking, ramkenneth, has granddaughter on Ipad for interpretation. Oriented to self. Intermittent nonproductive cough. Lungs dim, coarse. Blanchable redness to coccyx and heels, heels suspended on pillows. Incontinent of urine-purewick in place.    Major Shift Events: uneventful    Treatment Plan: Remdesivir, Decadron, and Lovenox, O2 support    Bedside Nurse: Alma Delia Bardales RN

## 2021-07-02 NOTE — PHARMACY-ADMISSION MEDICATION HISTORY
Admission medication history interview status for this patient is complete. See [facility] admission navigator for allergy information, prior to admission medications and immunization status.     Medication history interview done, indicate source(s): Shana (daughter) @ [phone]  Medication history resources (including written lists, pill bottles, clinic record): care-everywhere      Changes made to PTA medication list:  Added: APAP, namenda xr, celebrex, flexeril, aricept, ambien  Deleted: delsym liquid, oxycontin and percocet  Changed: gabapentin 100mg daily    Actions taken by pharmacist (provider contacted, etc):None     Additional medication history information:verified dose of metoprolol 100mg BID  with Margarette    Medication reconciliation/reorder completed by provider prior to medication history?  n   (Y/N)         Prior to Admission medications    Medication Sig Last Dose Taking? Auth Provider   ACETAMINOPHEN PO Take 650 mg by mouth every 8 hours as needed for pain   Yes Unknown, Entered By History   albuterol (PROAIR HFA/PROVENTIL HFA/VENTOLIN HFA) 108 (90 Base) MCG/ACT inhaler Inhale 2 puffs into the lungs every 6 hours as needed for shortness of breath / dyspnea or wheezing  Yes Rufino See MD   aspirin 81 MG tablet Take 81 mg by mouth every morning 7/1/2021 at Unknown time Yes Unknown, Entered By History   bisacodyl (DULCOLAX) 10 MG suppository Place 1 suppository rectally daily as needed.  Yes Kate Resendez MD   Cetirizine HCl (ZYRTEC ALLERGY PO) Take 10 mg by mouth daily as needed.  Yes Unknown, Entered By History   CITALOPRAM HYDROBROMIDE PO Take 40 mg by mouth daily. 6/30/2021 at Unknown time Yes Unknown, Entered By History   ergocalciferol (ERGOCALCIFEROL) 38669 UNIT capsule Take 50,000 Units by mouth Two times a week (Mondays and Fridays) 6/30/2021 at Unknown time Yes Unknown, Entered By History   GABAPENTIN PO Take 100 mg by mouth daily Takes for her balance.  7/1/2021 at Unknown  time Yes Unknown, Entered By History   HYDROcodone-acetaminophen (NORCO) 5-325 MG per tablet Take 1 tablet by mouth every 4 hours as needed for moderate to severe pain  Yes Gee Garcia MD   LEVOTHYROXINE SODIUM PO Take 50 mcg by mouth daily. 7/1/2021 at am Yes Unknown, Entered By History   lisinopril (PRINIVIL,ZESTRIL) 20 MG tablet Take 1 tablet by mouth daily. 7/1/2021 at am Yes Kate Resendez MD   metFORMIN (GLUCOPHAGE) 500 MG tablet Take 1 tablet by mouth 2 times daily (with meals). 7/1/2021 at am Yes Kate Resendez MD   metoprolol (LOPRESSOR) 100 MG tablet Take 1 tablet by mouth 2 times daily. 7/1/2021 at am Yes Kate Resendez MD   polyethylene glycol (MIRALAX/GLYCOLAX) packet Take 17 g by mouth daily. Past Week at Unknown time Yes Kate Resendez MD   senna-docusate (SENOKOT-S;PERICOLACE) 8.6-50 MG per tablet Take 2 tablets by mouth 2 times daily. 7/1/2021 at am Yes Kate Resendez MD   SIMVASTATIN PO Take 40 mg by mouth daily. 6/30/2021 at Unknown time Yes Unknown, Entered By History   Olopatadine HCl (PATANOL OP) Place 1 drop into both eyes as needed More than a month at Unknown time  Unknown, Entered By History

## 2021-07-02 NOTE — PLAN OF CARE
End of Shift Summary  For vital signs and complete assessments, please see documentation flowsheets.     Pertinent assessments: VSS 1L O2 for comfort, sats mid 90s on room air at rest. Pt Tanzanian speaking, hina, has granddaughter on Ipad for interpretation. Oriented to self only- believes she is in a tuberculosis hospital back in [place]. Intermittent nonproductive cough. Lungs dim, coarse. Blanchable redness to coccyx and heels, heels suspended on pillows. Incontinent of urine-purewick in place.    Major Shift Events: admission. , 381, 275    Treatment Plan: Remdesivir, Decadron, and Lovenox, O2 support

## 2021-07-02 NOTE — PHARMACY-ADMISSION MEDICATION HISTORY
7/2 - [facility] completed 7/1 pm, but noted many medications on prior to admission medication list did not correlate with fill hx and care everywhere.  Called daughter back to review medications in question and made several changes to medication list including:  Changed metoprolol to xl 25mg daily from 100mg bid, lisinopril to 40mg daily from 20mg daily, levothyroxine to 88mcg daily from 50mcg daily, citalopram to 20mg daily from 40mg daily(although daughter says pt takes prn), added Flovent HFA(that is inhaler daughter has been using although fill hx/care everywhere has Asmanex), added flonase prn, added fluocinolone BID for eczema, did not added seroquel as daughter says pt was not getting that, changed gabapentin to 600mg TID from 100mg daily.

## 2021-07-02 NOTE — PROVIDER NOTIFICATION
notifed MD via page: HS bg 441,r eceived 5u novolog, recheck 381- notifying per insulin order. Please advise, thanks

## 2021-07-03 LAB
ANION GAP SERPL CALCULATED.3IONS-SCNC: 6 MMOL/L (ref 3–14)
BUN SERPL-MCNC: 36 MG/DL (ref 7–30)
CALCIUM SERPL-MCNC: 8.3 MG/DL (ref 8.5–10.1)
CHLORIDE SERPL-SCNC: 108 MMOL/L (ref 94–109)
CO2 SERPL-SCNC: 23 MMOL/L (ref 20–32)
CREAT SERPL-MCNC: 1.01 MG/DL (ref 0.52–1.04)
CRP SERPL-MCNC: 19.7 MG/L (ref 0–8)
D DIMER PPP FEU-MCNC: 0.8 UG/ML FEU (ref 0–0.5)
ERYTHROCYTE [DISTWIDTH] IN BLOOD BY AUTOMATED COUNT: 12.5 % (ref 10–15)
FIBRINOGEN PPP-MCNC: 455 MG/DL (ref 200–420)
GFR SERPL CREATININE-BSD FRML MDRD: 47 ML/MIN/{1.73_M2}
GLUCOSE BLDC GLUCOMTR-MCNC: 210 MG/DL (ref 70–99)
GLUCOSE BLDC GLUCOMTR-MCNC: 214 MG/DL (ref 70–99)
GLUCOSE BLDC GLUCOMTR-MCNC: 229 MG/DL (ref 70–99)
GLUCOSE BLDC GLUCOMTR-MCNC: 248 MG/DL (ref 70–99)
GLUCOSE BLDC GLUCOMTR-MCNC: 345 MG/DL (ref 70–99)
GLUCOSE SERPL-MCNC: 197 MG/DL (ref 70–99)
HCT VFR BLD AUTO: 29.7 % (ref 35–47)
HGB BLD-MCNC: 9.5 G/DL (ref 11.7–15.7)
LACTATE BLD-SCNC: 3.1 MMOL/L (ref 0.7–2)
MCH RBC QN AUTO: 30.5 PG (ref 26.5–33)
MCHC RBC AUTO-ENTMCNC: 32 G/DL (ref 31.5–36.5)
MCV RBC AUTO: 96 FL (ref 78–100)
PLATELET # BLD AUTO: 226 10E9/L (ref 150–450)
POTASSIUM SERPL-SCNC: 5.3 MMOL/L (ref 3.4–5.3)
RBC # BLD AUTO: 3.11 10E12/L (ref 3.8–5.2)
SODIUM SERPL-SCNC: 137 MMOL/L (ref 133–144)
WBC # BLD AUTO: 7 10E9/L (ref 4–11)

## 2021-07-03 PROCEDURE — 85379 FIBRIN DEGRADATION QUANT: CPT | Performed by: INTERNAL MEDICINE

## 2021-07-03 PROCEDURE — 99233 SBSQ HOSP IP/OBS HIGH 50: CPT | Performed by: HOSPITALIST

## 2021-07-03 PROCEDURE — 250N000011 HC RX IP 250 OP 636: Performed by: INTERNAL MEDICINE

## 2021-07-03 PROCEDURE — 120N000001 HC R&B MED SURG/OB

## 2021-07-03 PROCEDURE — 250N000012 HC RX MED GY IP 250 OP 636 PS 637: Performed by: HOSPITALIST

## 2021-07-03 PROCEDURE — 999N001017 HC STATISTIC GLUCOSE BY METER IP

## 2021-07-03 PROCEDURE — 86140 C-REACTIVE PROTEIN: CPT | Performed by: INTERNAL MEDICINE

## 2021-07-03 PROCEDURE — 250N000009 HC RX 250: Performed by: INTERNAL MEDICINE

## 2021-07-03 PROCEDURE — 36415 COLL VENOUS BLD VENIPUNCTURE: CPT | Performed by: INTERNAL MEDICINE

## 2021-07-03 PROCEDURE — 250N000013 HC RX MED GY IP 250 OP 250 PS 637: Performed by: HOSPITALIST

## 2021-07-03 PROCEDURE — 85384 FIBRINOGEN ACTIVITY: CPT | Performed by: INTERNAL MEDICINE

## 2021-07-03 PROCEDURE — 258N000003 HC RX IP 258 OP 636: Performed by: INTERNAL MEDICINE

## 2021-07-03 PROCEDURE — 85027 COMPLETE CBC AUTOMATED: CPT | Performed by: INTERNAL MEDICINE

## 2021-07-03 PROCEDURE — 36415 COLL VENOUS BLD VENIPUNCTURE: CPT | Performed by: HOSPITALIST

## 2021-07-03 PROCEDURE — 83605 ASSAY OF LACTIC ACID: CPT | Performed by: HOSPITALIST

## 2021-07-03 PROCEDURE — 258N000003 HC RX IP 258 OP 636: Performed by: HOSPITALIST

## 2021-07-03 PROCEDURE — 250N000012 HC RX MED GY IP 250 OP 636 PS 637: Performed by: INTERNAL MEDICINE

## 2021-07-03 PROCEDURE — 80048 BASIC METABOLIC PNL TOTAL CA: CPT | Performed by: INTERNAL MEDICINE

## 2021-07-03 RX ADMIN — DEXAMETHASONE 6 MG: 2 TABLET ORAL at 08:00

## 2021-07-03 RX ADMIN — INSULIN ASPART 2 UNITS: 100 INJECTION, SOLUTION INTRAVENOUS; SUBCUTANEOUS at 08:00

## 2021-07-03 RX ADMIN — MEMANTINE HYDROCHLORIDE 28 MG: 28 CAPSULE, EXTENDED RELEASE ORAL at 08:00

## 2021-07-03 RX ADMIN — INSULIN GLARGINE 14 UNITS: 100 INJECTION, SOLUTION SUBCUTANEOUS at 21:32

## 2021-07-03 RX ADMIN — LEVOTHYROXINE SODIUM 88 MCG: 0.09 TABLET ORAL at 08:01

## 2021-07-03 RX ADMIN — CITALOPRAM HYDROBROMIDE 20 MG: 20 TABLET ORAL at 08:01

## 2021-07-03 RX ADMIN — METOPROLOL SUCCINATE 25 MG: 25 TABLET, EXTENDED RELEASE ORAL at 08:01

## 2021-07-03 RX ADMIN — REMDESIVIR 100 MG: 100 INJECTION, POWDER, LYOPHILIZED, FOR SOLUTION INTRAVENOUS at 17:13

## 2021-07-03 RX ADMIN — GABAPENTIN 600 MG: 300 CAPSULE ORAL at 21:26

## 2021-07-03 RX ADMIN — CYCLOBENZAPRINE HYDROCHLORIDE 10 MG: 5 TABLET, FILM COATED ORAL at 08:00

## 2021-07-03 RX ADMIN — SODIUM CHLORIDE 50 ML: 9 INJECTION, SOLUTION INTRAVENOUS at 17:16

## 2021-07-03 RX ADMIN — SODIUM CHLORIDE, POTASSIUM CHLORIDE, SODIUM LACTATE AND CALCIUM CHLORIDE 500 ML: 600; 310; 30; 20 INJECTION, SOLUTION INTRAVENOUS at 15:30

## 2021-07-03 RX ADMIN — SIMVASTATIN 40 MG: 40 TABLET, FILM COATED ORAL at 08:00

## 2021-07-03 RX ADMIN — GABAPENTIN 600 MG: 300 CAPSULE ORAL at 08:01

## 2021-07-03 RX ADMIN — LISINOPRIL 40 MG: 40 TABLET ORAL at 08:00

## 2021-07-03 RX ADMIN — ENOXAPARIN SODIUM 40 MG: 40 INJECTION SUBCUTANEOUS at 21:33

## 2021-07-03 RX ADMIN — GABAPENTIN 600 MG: 300 CAPSULE ORAL at 15:31

## 2021-07-03 RX ADMIN — DONEPEZIL HYDROCHLORIDE 10 MG: 5 TABLET ORAL at 21:27

## 2021-07-03 RX ADMIN — FLUTICASONE FUROATE 1 PUFF: 200 POWDER RESPIRATORY (INHALATION) at 08:09

## 2021-07-03 ASSESSMENT — ACTIVITIES OF DAILY LIVING (ADL)
ADLS_ACUITY_SCORE: 32
ADLS_ACUITY_SCORE: 32
ADLS_ACUITY_SCORE: 33
ADLS_ACUITY_SCORE: 32
ADLS_ACUITY_SCORE: 31
ADLS_ACUITY_SCORE: 32

## 2021-07-03 NOTE — PROGRESS NOTES
Dr. Espino called back and will order IV bolus for Lactic 3.1.     Pt has been A/O but confused. Gabonese speaking. Needs assistance with eating, ate well for both meals. Up with A2 and Beronica Steady, pt very weak.     Around 1415, pt O2 dropped to 88% on RA while sleeping in bed. Placed on 1L NC, oxygen up to 92%. Lung sounds with fine crackles at bases. Non-productive cough present.

## 2021-07-03 NOTE — PLAN OF CARE
Assessments: Alert to self, confused. On room air, maintained O2 to mid 90s.  Pt Iranian speaking, family on Ipad for interpretation. Intermittent nonproductive cough. Blanchable redness to coccyx and heels, heels suspended on pillows. Incontinent of urine-purewick in place. Denies pain.     Treatment Plan: Remdesivir, Decadron, and Lovenox, O2 support    Bedside Nurse: Cole Saleh RN

## 2021-07-03 NOTE — PLAN OF CARE
End of Shift Summary  For vital signs and complete assessments, please see documentation flowsheets.     Pertinent assessments: VSS. Afebrile. LS clear. BS x4. PAINAD score of 0; Pt does not appear to be in pain. Regular diet. Using Lift, Ax2 to move Patient. PIV - SL. Incontinent of urine, Pure-Wick in use. BG was 229. Confused, Alarm on bed for safety. Slept well.   Major Shift Events: none   Treatment Plan: Decadron, Lovenox, Remdisivir, Continue to monitor Oxygenation status, Hopeful for discharge home soon.

## 2021-07-03 NOTE — PROGRESS NOTES
[facility]    Hospitalist Progress Note    Date of Service (when I saw the patient): 07/03/2021  Provider:  Alexander Espino MD   Text Page  7am - 6PM       Assessment & Plan   Nhi Perry is a 89 year old female admitted on 7/1/2021. She became Covid +1-week before admission and came in with shortness of breath and infiltrate seen on chest x-ray bilaterally in her lungs     COVID-19 infection  Bilateral pneumonia due to COVID-19 infection  Remdesivir and dexamethasone per protocol  Anticoagulation w/enoxaparin  Monitor oxygen needs, supplement as needed     Metabolic encephalopathy  Probably on the basis of Covid pneumonia aggravating underlying cognitive impairment  Monitor closely.  Haldol as needed for agitation.    Acute hyperkalemia  BMP daily  At the present time potassium is normal     Hypertension  lisinopril 20 mg daily       Type 2 diabetes mellitus  Metformin on hold.   Lantus 14 international unit(s) hs   Sliding scale insulin high intensity.     Hypothyroidism  Thyroxine as prior to admission     Hypercholesteremia  Simvastatin     Remote history of tuberculosis, fully treated per patient report. This makes more sense to CT findings initially thought to be related to Asbestos.     Chronic normocytic normochromic anemia, likely from chronic disease.    CKD Stage IIIa. Follow up    Diet:  Diabetic diet  DVT Prophylaxis: Enoxaparin (Lovenox) SQ  Hawley Catheter: Not present  Central Lines: None  Code Status: Full Code    Disposition: Expected discharge TBD, she may need to go to TCU for Covid patients given her family situation.  Her son-in-law is in the ICU in critical condition, no family member available that can care for her at home.     Interval History   Patient is [place]-speaking, I talked to daughter Shana. Patient adamantly wants to go home, she is still having dyspnea and frequent cough but her oxygen necessities improved , now RA.  She reports history of  tuberculosis, fully treated many years ago in the [place].      -Data reviewed today: I reviewed all new labs and imaging results over the last 24 hours.     Physical Exam   Temp: 97.7  F (36.5  C) Temp src: Oral BP: (Abnormal) 141/56 Pulse: 68   Resp: 18 SpO2: 92 % O2 Device: None (Room air)    There were no vitals filed for this visit.  Vital Signs with Ranges  Temp:  [96.7  F (35.9  C)-97.7  F (36.5  C)] 97.7  F (36.5  C)  Pulse:  [68-77] 68  Resp:  [18] 18  BP: (130-155)/() 141/56  SpO2:  [92 %-94 %] 92 %  I/O last 3 completed shifts:  In: 0   Out: 1700 [Urine:1700]    GEN:  Alert, cooperates, appears comfortable, NAD.  HEENT:  Normocephalic/atraumatic, no scleral icterus, no nasal discharge, mouth moist.  CV:  Regular rate and rhythm, no murmur or JVD.  S1 + S2 noted, no S3 or S4.  LUNGS: Dyspnea, dry cough. Coarse sounds to auscultation bilaterally without rales/rhonchi/wheezing/retractions.  Symmetric chest rise on inhalation noted.  ABD:  Active bowel sounds, soft, non-tender/non-distended.  No rebound/guarding/rigidity.  EXT:  No edema or cyanosis.  No joint synovitis noted.  SKIN:  Dry to touch, no exanthems noted in the visualized areas.       Medications     - MEDICATION INSTRUCTIONS -         sodium chloride 0.9%  500 mL Intravenous Once     remdesivir  100 mg Intravenous Q24H    And     sodium chloride 0.9%  50 mL Intravenous Q24H     citalopram  20 mg Oral Daily     cyclobenzaprine  10 mg Oral Daily     dexamethasone  6 mg Oral Daily     donepezil  10 mg Oral At Bedtime     enoxaparin ANTICOAGULANT  40 mg Subcutaneous Q24H     fluticasone  1 puff Inhalation Daily     gabapentin  600 mg Oral TID     insulin aspart  1-10 Units Subcutaneous TID AC     insulin aspart  1-7 Units Subcutaneous At Bedtime     insulin glargine  14 Units Subcutaneous At Bedtime     levothyroxine  88 mcg Oral Daily     lisinopril  40 mg Oral Daily     memantine XR  28 mg Oral Daily     metoprolol succinate ER   25 mg Oral Daily     simvastatin  40 mg Oral Daily     sodium chloride (PF)  3 mL Intracatheter Q8H       Data   Recent Labs   Lab 07/03/21  0744 07/02/21  0753 07/01/21  1844 07/01/21  1242   WBC 7.0 4.2 4.2 4.0   HGB 9.5* 9.5* 9.7* 11.0*   MCV 96 93 95 94    184 170 189   INR  --   --  1.14  --     135 130* 129*   POTASSIUM 5.3 5.1 5.7* 5.4*   CHLORIDE 108 109 105 99   CO2 PENDING 22 20 23   BUN PENDING 35* 34* 35*   CR PENDING 1.06* 1.21* 1.48*   ANIONGAP PENDING 4 5 7   CHRIS PENDING 8.2* 7.9* 8.4*   GLC PENDING 161* 279* 218*   ALBUMIN  --   --  2.4* 2.7*   PROTTOTAL  --   --  6.7* 7.2   BILITOTAL  --   --  0.2 0.4   ALKPHOS  --   --  94 102   ALT  --   --  57* 66*   AST  --   --  30 35   TROPI  --   --  <0.015  --        No results found for this or any previous visit (from the past 24 hour(s)).    Disclaimer: This note consists of symbols derived from keyboarding, dictation and/or voice recognition software. As a result, there may be errors in the script that have gone undetected. Please consider this when interpreting information found in this chart.

## 2021-07-03 NOTE — CONSULTS
Care Management Initial Consult    General Information  Assessment completed with: Children, Derek Saldana  Type of CM/SW Visit: Initial Assessment    Primary Care Provider verified and updated as needed:     Readmission within the last 30 days: current reason for admission unrelated to previous admission   Return Category: Progression of disease  Reason for Consult: discharge planning  Advance Care Planning: Advance Care Planning Reviewed: no concerns identified          Communication Assessment  Patient's communication style: spoken language (non-English)  [place]ian  Hearing Difficulty or Deaf: no   Wear Glasses or Blind: no    Cognitive  Cognitive/Neuro/Behavioral: .WDL except  Level of Consciousness: alert, confused(confused at times)  Arousal Level: opens eyes spontaneously  Orientation: disoriented to, situation  Mood/Behavior: calm, cooperative  Best Language: 0 - No aphasia  Speech: JOSE (unable to assess)    Living Environment:   People in home:   Daughter and VANESSA  Current living Arrangements:    House     Able to return to prior arrangements:  Yes       Family/Social Support:  Care provided by: derek Saldana and VANESSA Khan       Description of Support System:   Adequate/supportive        Current Resources:   Patient receiving home care services:  NONE  Community Resources:  NONE  Equipment currently used at home: other (see comments) Wheelchair at baseline    Lifestyle & Psychosocial Needs:        Socioeconomic History     Marital status:      Spouse name: Not on file     Number of children: Not on file     Years of education: Not on file     Highest education level: Not on file     Tobacco Use     Smoking status: Never Smoker   Substance and Sexual Activity     Alcohol use: No     Alcohol/week: 0.0 standard drinks     Drug use: No     Sexual activity: Never         Mental Health Status:  Mental Health Status: No Current Concerns       Chemical Dependency Status:  Chemical Dependency Status: No Current  Concerns             Values/Beliefs:  Spiritual, Cultural Beliefs, Rastafari Practices, Values that affect care:  Not discussed during this encounter.        Additional Information:  SW called pt's daughter Shana. Pt lives with Shana and Shana's  Erin who are pt's caregivers. Shana is covid positive and so is her  (also a [facility] pt). Per Shana pt wants to go home at discharge and is adamant about not wanting to go to a SNF. Shana is agreeable to pt returning home with home care. SW explained home care vs private pay care.     Pt will need a WC ride arranged at discharge. SW explained the cost of the WC ride and Shana is agreeable.     RUBY Adhikari, [facility]  Casual    [facility]  [phone]

## 2021-07-04 LAB
ANION GAP SERPL CALCULATED.3IONS-SCNC: 1 MMOL/L (ref 3–14)
BUN SERPL-MCNC: 36 MG/DL (ref 7–30)
CALCIUM SERPL-MCNC: 8.6 MG/DL (ref 8.5–10.1)
CHLORIDE SERPL-SCNC: 108 MMOL/L (ref 94–109)
CO2 SERPL-SCNC: 26 MMOL/L (ref 20–32)
CREAT SERPL-MCNC: 0.97 MG/DL (ref 0.52–1.04)
CRP SERPL-MCNC: 12 MG/L (ref 0–8)
D DIMER PPP FEU-MCNC: 0.8 UG/ML FEU (ref 0–0.5)
ERYTHROCYTE [DISTWIDTH] IN BLOOD BY AUTOMATED COUNT: 12.5 % (ref 10–15)
FIBRINOGEN PPP-MCNC: 458 MG/DL (ref 200–420)
GFR SERPL CREATININE-BSD FRML MDRD: 49 ML/MIN/{1.73_M2}
GLUCOSE BLDC GLUCOMTR-MCNC: 137 MG/DL (ref 70–99)
GLUCOSE BLDC GLUCOMTR-MCNC: 192 MG/DL (ref 70–99)
GLUCOSE BLDC GLUCOMTR-MCNC: 241 MG/DL (ref 70–99)
GLUCOSE BLDC GLUCOMTR-MCNC: 245 MG/DL (ref 70–99)
GLUCOSE BLDC GLUCOMTR-MCNC: 271 MG/DL (ref 70–99)
GLUCOSE SERPL-MCNC: 144 MG/DL (ref 70–99)
HCT VFR BLD AUTO: 29.4 % (ref 35–47)
HGB BLD-MCNC: 9.6 G/DL (ref 11.7–15.7)
MCH RBC QN AUTO: 30.8 PG (ref 26.5–33)
MCHC RBC AUTO-ENTMCNC: 32.7 G/DL (ref 31.5–36.5)
MCV RBC AUTO: 94 FL (ref 78–100)
PLATELET # BLD AUTO: 210 10E9/L (ref 150–450)
POTASSIUM SERPL-SCNC: 5 MMOL/L (ref 3.4–5.3)
RBC # BLD AUTO: 3.12 10E12/L (ref 3.8–5.2)
SODIUM SERPL-SCNC: 135 MMOL/L (ref 133–144)
WBC # BLD AUTO: 6.7 10E9/L (ref 4–11)

## 2021-07-04 PROCEDURE — 250N000012 HC RX MED GY IP 250 OP 636 PS 637: Performed by: INTERNAL MEDICINE

## 2021-07-04 PROCEDURE — 80048 BASIC METABOLIC PNL TOTAL CA: CPT | Performed by: INTERNAL MEDICINE

## 2021-07-04 PROCEDURE — 85379 FIBRIN DEGRADATION QUANT: CPT | Performed by: INTERNAL MEDICINE

## 2021-07-04 PROCEDURE — 99233 SBSQ HOSP IP/OBS HIGH 50: CPT | Performed by: HOSPITALIST

## 2021-07-04 PROCEDURE — 250N000011 HC RX IP 250 OP 636: Performed by: INTERNAL MEDICINE

## 2021-07-04 PROCEDURE — 250N000013 HC RX MED GY IP 250 OP 250 PS 637: Performed by: INTERNAL MEDICINE

## 2021-07-04 PROCEDURE — 85027 COMPLETE CBC AUTOMATED: CPT | Performed by: INTERNAL MEDICINE

## 2021-07-04 PROCEDURE — 250N000012 HC RX MED GY IP 250 OP 636 PS 637: Performed by: HOSPITALIST

## 2021-07-04 PROCEDURE — 85384 FIBRINOGEN ACTIVITY: CPT | Performed by: INTERNAL MEDICINE

## 2021-07-04 PROCEDURE — 120N000001 HC R&B MED SURG/OB

## 2021-07-04 PROCEDURE — 250N000013 HC RX MED GY IP 250 OP 250 PS 637: Performed by: HOSPITALIST

## 2021-07-04 PROCEDURE — 258N000003 HC RX IP 258 OP 636: Performed by: INTERNAL MEDICINE

## 2021-07-04 PROCEDURE — 86140 C-REACTIVE PROTEIN: CPT | Performed by: INTERNAL MEDICINE

## 2021-07-04 PROCEDURE — 250N000009 HC RX 250: Performed by: INTERNAL MEDICINE

## 2021-07-04 PROCEDURE — 999N001017 HC STATISTIC GLUCOSE BY METER IP

## 2021-07-04 PROCEDURE — 36415 COLL VENOUS BLD VENIPUNCTURE: CPT | Performed by: INTERNAL MEDICINE

## 2021-07-04 RX ORDER — HYDRALAZINE HYDROCHLORIDE 20 MG/ML
10 INJECTION INTRAMUSCULAR; INTRAVENOUS EVERY 6 HOURS PRN
Status: DISCONTINUED | OUTPATIENT
Start: 2021-07-04 | End: 2021-07-05 | Stop reason: HOSPADM

## 2021-07-04 RX ORDER — GUAIFENESIN/DEXTROMETHORPHAN 100-10MG/5
10 SYRUP ORAL EVERY 6 HOURS PRN
Status: DISCONTINUED | OUTPATIENT
Start: 2021-07-04 | End: 2021-07-05 | Stop reason: HOSPADM

## 2021-07-04 RX ORDER — BENZONATATE 100 MG/1
100 CAPSULE ORAL 3 TIMES DAILY PRN
Status: DISCONTINUED | OUTPATIENT
Start: 2021-07-04 | End: 2021-07-05 | Stop reason: HOSPADM

## 2021-07-04 RX ADMIN — LEVOTHYROXINE SODIUM 88 MCG: 0.09 TABLET ORAL at 08:16

## 2021-07-04 RX ADMIN — GABAPENTIN 600 MG: 300 CAPSULE ORAL at 08:16

## 2021-07-04 RX ADMIN — SODIUM CHLORIDE 50 ML: 9 INJECTION, SOLUTION INTRAVENOUS at 17:47

## 2021-07-04 RX ADMIN — DONEPEZIL HYDROCHLORIDE 10 MG: 5 TABLET ORAL at 21:27

## 2021-07-04 RX ADMIN — SIMVASTATIN 40 MG: 40 TABLET, FILM COATED ORAL at 08:16

## 2021-07-04 RX ADMIN — LISINOPRIL 40 MG: 40 TABLET ORAL at 08:16

## 2021-07-04 RX ADMIN — DEXAMETHASONE 6 MG: 2 TABLET ORAL at 08:16

## 2021-07-04 RX ADMIN — METOPROLOL SUCCINATE 25 MG: 25 TABLET, EXTENDED RELEASE ORAL at 08:16

## 2021-07-04 RX ADMIN — CYCLOBENZAPRINE HYDROCHLORIDE 10 MG: 5 TABLET, FILM COATED ORAL at 08:16

## 2021-07-04 RX ADMIN — GABAPENTIN 600 MG: 300 CAPSULE ORAL at 21:28

## 2021-07-04 RX ADMIN — FLUTICASONE FUROATE 1 PUFF: 200 POWDER RESPIRATORY (INHALATION) at 08:23

## 2021-07-04 RX ADMIN — BENZONATATE 100 MG: 100 CAPSULE ORAL at 04:01

## 2021-07-04 RX ADMIN — BENZONATATE 100 MG: 100 CAPSULE ORAL at 09:19

## 2021-07-04 RX ADMIN — ENOXAPARIN SODIUM 40 MG: 40 INJECTION SUBCUTANEOUS at 21:30

## 2021-07-04 RX ADMIN — MEMANTINE HYDROCHLORIDE 28 MG: 28 CAPSULE, EXTENDED RELEASE ORAL at 08:16

## 2021-07-04 RX ADMIN — CITALOPRAM HYDROBROMIDE 20 MG: 20 TABLET ORAL at 08:16

## 2021-07-04 RX ADMIN — GABAPENTIN 600 MG: 300 CAPSULE ORAL at 17:32

## 2021-07-04 RX ADMIN — REMDESIVIR 100 MG: 100 INJECTION, POWDER, LYOPHILIZED, FOR SOLUTION INTRAVENOUS at 17:33

## 2021-07-04 RX ADMIN — INSULIN GLARGINE 14 UNITS: 100 INJECTION, SOLUTION SUBCUTANEOUS at 21:49

## 2021-07-04 ASSESSMENT — ACTIVITIES OF DAILY LIVING (ADL)
ADLS_ACUITY_SCORE: 30
ADLS_ACUITY_SCORE: 30
ADLS_ACUITY_SCORE: 32
ADLS_ACUITY_SCORE: 32
ADLS_ACUITY_SCORE: 34
ADLS_ACUITY_SCORE: 32

## 2021-07-04 NOTE — PLAN OF CARE
Assessments: VSS. Afebrile. LS - crackles with expiratory wheeze. Infrequent non-productive cough. On O2 1L/nc. O2 sats at mid 90s. No SoB. Purewick in place.     Treatment Plan: Decadron, Lovenox, Remdesivir, monitor respiratory status      Bedside Nurse: Cole Saleh RN

## 2021-07-04 NOTE — PLAN OF CARE
VSS. B/P slightly elevated @ 155/78. Afebrile. LS expiratory wheezes. Infrequent non-productive cough. Did need Tessalon. On O2 1L/nc. O2 sats at mid 90s. ALEXANDRA. Purewick in place.     Treatment Plan: Decadron, Lovenox, Remdesivir, monitor respiratory status

## 2021-07-04 NOTE — PROGRESS NOTES
Care Management Follow Up    Length of Stay (days): 3    Expected Discharge Date: 07/05/21     Concerns to be Addressed: discharge planning     Patient plan of care discussed at interdisciplinary rounds: Yes    Anticipated Discharge Disposition: Home Care     Anticipated Discharge Services:    Anticipated Discharge DME:      Patient/family educated on Medicare website which has current facility and service quality ratings:    Education Provided on the Discharge Plan:    Patient/Family in Agreement with the Plan:      Referrals Placed by CM/SW:    Private pay costs discussed: transportation costs    Additional Information:  Follow up call with daughter Shana, she did get a call from MD this morning and is aware possible discharge home tomorrow.  Patient will need w/c transport arranged home as daughter is covid positive as well.  CM will follow and assist with discharge needs.      ARMAND vo  [phone]

## 2021-07-04 NOTE — PROGRESS NOTES
[facility]    Hospitalist Progress Note    Date of Service (when I saw the patient): 07/04/2021  Provider:  Alexander Espino MD   Text Page  7am - 6PM       Assessment & Plan   Nhi Perry is a 89 year old female admitted on 7/1/2021. She became Covid +1-week before admission and came in with shortness of breath and infiltrate seen on chest x-ray bilaterally in her lungs.      COVID-19 infection  Bilateral pneumonia due to COVID-19 infection  Remdesivir 4/5 and dexamethasone per protocol day 4/10  Anticoagulation w/enoxaparin  Monitor oxygen needs, supplement as needed     Metabolic encephalopathy  Probably on the basis of Covid pneumonia aggravating underlying cognitive impairment  Monitor closely.  Haldol as needed for agitation.    Acute hyperkalemia on admission.   BMP daily  K is normal     Hypertension  lisinopril 20 mg daily  SBP is high, I suspect Decadron is contributing, She is not symptomatic.        Type 2 diabetes mellitus  Metformin on hold.   Lantus 14 international unit(s) hs   Sliding scale insulin high intensity.     Hypothyroidism  Thyroxine as prior to admission     Hypercholesteremia  Simvastatin     Remote history of tuberculosis, fully treated per patient report many years ago in the [place]. This makes more sense to CT findings initially thought to be related to Asbestos.     Chronic normocytic normochromic anemia, likely from chronic disease.    CKD Stage IIIa.  Stable.  Follow up.     Diet:  Diabetic diet  DVT Prophylaxis: Enoxaparin (Lovenox) SQ  Hawley Catheter: Not present  Central Lines: None  Code Status: Full Code    Disposition: Expected discharge TBD, she may need to go to TCU for Covid patients given her family situation.  Her son-in-law is in the ICU in critical condition, no family member available that can take her back to home. She will need transport set by the hospital at discharge, likely tomorrow.     Interval History   Patient is  [place]-speaking and has dementia, challenging communication, She seems to be improving, low O2 necessity, CRP in downward trend, afebrile. SBP is high but not symptomatic I will update her daughter Shana. Patient wants adamantly  to go home, she does not understand why she is in the hospital.      -Data reviewed today: I reviewed all new labs and imaging results over the last 24 hours.     Physical Exam   Temp: 98.5  F (36.9  C) Temp src: Oral BP: (Abnormal) 175/73 Pulse: 51   Resp: 18 SpO2: 96 % O2 Device: Nasal cannula Oxygen Delivery: 1 LPM  There were no vitals filed for this visit.  Vital Signs with Ranges  Temp:  [97.6  F (36.4  C)-98.6  F (37  C)] 98.5  F (36.9  C)  Pulse:  [51-92] 51  Resp:  [16-20] 18  BP: (130-175)/(57-78) 175/73  SpO2:  [88 %-96 %] 96 %  I/O last 3 completed shifts:  In: 230 [P.O.:230]  Out: -     GEN:  Alert, cooperates, appears comfortable, NAD.  HEENT:  Normocephalic/atraumatic, no scleral icterus, no nasal discharge, mouth moist.  CV:  Regular rate and rhythm, no murmur or JVD.  S1 + S2 noted, no S3 or S4.  LUNGS: Dyspnea, dry cough. Coarse sounds to auscultation bilaterally without rales/rhonchi/wheezing/retractions.  Symmetric chest rise on inhalation noted.  ABD:  Active bowel sounds, soft, non-tender/non-distended.  No rebound/guarding/rigidity.  EXT:  No edema or cyanosis.  No joint synovitis noted.  SKIN:  Dry to touch, no exanthems noted in the visualized areas.       Medications     - MEDICATION INSTRUCTIONS -         sodium chloride 0.9%  500 mL Intravenous Once     remdesivir  100 mg Intravenous Q24H    And     sodium chloride 0.9%  50 mL Intravenous Q24H     citalopram  20 mg Oral Daily     cyclobenzaprine  10 mg Oral Daily     dexamethasone  6 mg Oral Daily     donepezil  10 mg Oral At Bedtime     enoxaparin ANTICOAGULANT  40 mg Subcutaneous Q24H     fluticasone  1 puff Inhalation Daily     gabapentin  600 mg Oral TID     insulin aspart  1-10 Units Subcutaneous TID AC      insulin aspart  1-7 Units Subcutaneous At Bedtime     insulin glargine  14 Units Subcutaneous At Bedtime     levothyroxine  88 mcg Oral Daily     lisinopril  40 mg Oral Daily     memantine XR  28 mg Oral Daily     metoprolol succinate ER  25 mg Oral Daily     simvastatin  40 mg Oral Daily     sodium chloride (PF)  3 mL Intracatheter Q8H       Data   Recent Labs   Lab 07/04/21  0721 07/03/21  0744 07/02/21  0753 07/01/21  1844 07/01/21  1242   WBC 6.7 7.0 4.2 4.2 4.0   HGB 9.6* 9.5* 9.5* 9.7* 11.0*   MCV 94 96 93 95 94    226 184 170 189   INR  --   --   --  1.14  --     137 135 130* 129*   POTASSIUM 5.0 5.3 5.1 5.7* 5.4*   CHLORIDE 108 108 109 105 99   CO2 26 23 22 20 23   BUN 36* 36* 35* 34* 35*   CR 0.97 1.01 1.06* 1.21* 1.48*   ANIONGAP 1* 6 4 5 7   CHRIS 8.6 8.3* 8.2* 7.9* 8.4*   * 197* 161* 279* 218*   ALBUMIN  --   --   --  2.4* 2.7*   PROTTOTAL  --   --   --  6.7* 7.2   BILITOTAL  --   --   --  0.2 0.4   ALKPHOS  --   --   --  94 102   ALT  --   --   --  57* 66*   AST  --   --   --  30 35   TROPI  --   --   --  <0.015  --        No results found for this or any previous visit (from the past 24 hour(s)).    Disclaimer: This note consists of symbols derived from keyboarding, dictation and/or voice recognition software. As a result, there may be errors in the script that have gone undetected. Please consider this when interpreting information found in this chart.

## 2021-07-04 NOTE — PLAN OF CARE
Pt is here with +COVID. A/O to self d/t dementia. Pt offered to get into chair throughout the day but she has refused.  used on the ipad for translation and explained the benefits of getting out of bed. Pt is A2 with lizet nash.  PT consult ordered.     Pt is now on RA sating in the low 90's. Blood pressure has been elevated throughout the day, MD is aware. Pt is asymptomatic. Dry, non-productive cough present, Tessalon cyn given, effective.     Plan: Remdesivir given this evening, lovenox, decadron.

## 2021-07-05 ENCOUNTER — APPOINTMENT (OUTPATIENT)
Dept: PHYSICAL THERAPY | Facility: CLINIC | Age: 86
DRG: 177 | End: 2021-07-05
Attending: HOSPITALIST
Payer: COMMERCIAL

## 2021-07-05 VITALS
OXYGEN SATURATION: 92 % | RESPIRATION RATE: 18 BRPM | SYSTOLIC BLOOD PRESSURE: 152 MMHG | TEMPERATURE: 97.8 F | HEART RATE: 65 BPM | DIASTOLIC BLOOD PRESSURE: 58 MMHG

## 2021-07-05 LAB
ANION GAP SERPL CALCULATED.3IONS-SCNC: 5 MMOL/L (ref 3–14)
BUN SERPL-MCNC: 32 MG/DL (ref 7–30)
CALCIUM SERPL-MCNC: 8.8 MG/DL (ref 8.5–10.1)
CHLORIDE SERPL-SCNC: 106 MMOL/L (ref 94–109)
CO2 SERPL-SCNC: 24 MMOL/L (ref 20–32)
CREAT SERPL-MCNC: 0.85 MG/DL (ref 0.52–1.04)
CRP SERPL-MCNC: 8.4 MG/L (ref 0–8)
D DIMER PPP FEU-MCNC: 1 UG/ML FEU (ref 0–0.5)
ERYTHROCYTE [DISTWIDTH] IN BLOOD BY AUTOMATED COUNT: 12.3 % (ref 10–15)
FIBRINOGEN PPP-MCNC: 447 MG/DL (ref 200–420)
GFR SERPL CREATININE-BSD FRML MDRD: 58 ML/MIN/{1.73_M2}
GLUCOSE BLDC GLUCOMTR-MCNC: 162 MG/DL (ref 70–99)
GLUCOSE BLDC GLUCOMTR-MCNC: 213 MG/DL (ref 70–99)
GLUCOSE BLDC GLUCOMTR-MCNC: 286 MG/DL (ref 70–99)
GLUCOSE BLDC GLUCOMTR-MCNC: 307 MG/DL (ref 70–99)
GLUCOSE SERPL-MCNC: 168 MG/DL (ref 70–99)
HCT VFR BLD AUTO: 32.6 % (ref 35–47)
HGB BLD-MCNC: 10.8 G/DL (ref 11.7–15.7)
MCH RBC QN AUTO: 30.6 PG (ref 26.5–33)
MCHC RBC AUTO-ENTMCNC: 33.1 G/DL (ref 31.5–36.5)
MCV RBC AUTO: 92 FL (ref 78–100)
PLATELET # BLD AUTO: 272 10E9/L (ref 150–450)
POTASSIUM SERPL-SCNC: 4.7 MMOL/L (ref 3.4–5.3)
RBC # BLD AUTO: 3.53 10E12/L (ref 3.8–5.2)
SODIUM SERPL-SCNC: 135 MMOL/L (ref 133–144)
WBC # BLD AUTO: 7.8 10E9/L (ref 4–11)

## 2021-07-05 PROCEDURE — 250N000013 HC RX MED GY IP 250 OP 250 PS 637: Performed by: HOSPITALIST

## 2021-07-05 PROCEDURE — 85384 FIBRINOGEN ACTIVITY: CPT | Performed by: INTERNAL MEDICINE

## 2021-07-05 PROCEDURE — 250N000009 HC RX 250: Performed by: INTERNAL MEDICINE

## 2021-07-05 PROCEDURE — 999N000157 HC STATISTIC RCP TIME EA 10 MIN

## 2021-07-05 PROCEDURE — 250N000012 HC RX MED GY IP 250 OP 636 PS 637: Performed by: INTERNAL MEDICINE

## 2021-07-05 PROCEDURE — 99239 HOSP IP/OBS DSCHRG MGMT >30: CPT | Performed by: HOSPITALIST

## 2021-07-05 PROCEDURE — 80048 BASIC METABOLIC PNL TOTAL CA: CPT | Performed by: INTERNAL MEDICINE

## 2021-07-05 PROCEDURE — 97161 PT EVAL LOW COMPLEX 20 MIN: CPT | Mod: GP | Performed by: PHYSICAL THERAPIST

## 2021-07-05 PROCEDURE — 999N001017 HC STATISTIC GLUCOSE BY METER IP

## 2021-07-05 PROCEDURE — 36415 COLL VENOUS BLD VENIPUNCTURE: CPT | Performed by: INTERNAL MEDICINE

## 2021-07-05 PROCEDURE — 86140 C-REACTIVE PROTEIN: CPT | Performed by: INTERNAL MEDICINE

## 2021-07-05 PROCEDURE — 250N000013 HC RX MED GY IP 250 OP 250 PS 637: Performed by: INTERNAL MEDICINE

## 2021-07-05 PROCEDURE — 258N000003 HC RX IP 258 OP 636: Performed by: INTERNAL MEDICINE

## 2021-07-05 PROCEDURE — 85027 COMPLETE CBC AUTOMATED: CPT | Performed by: INTERNAL MEDICINE

## 2021-07-05 PROCEDURE — 85379 FIBRIN DEGRADATION QUANT: CPT | Performed by: INTERNAL MEDICINE

## 2021-07-05 PROCEDURE — 250N000011 HC RX IP 250 OP 636: Performed by: INTERNAL MEDICINE

## 2021-07-05 RX ORDER — GUAIFENESIN/DEXTROMETHORPHAN 100-10MG/5
10 SYRUP ORAL 4 TIMES DAILY PRN
Qty: 236 ML | Refills: 0 | Status: SHIPPED | OUTPATIENT
Start: 2021-07-05

## 2021-07-05 RX ORDER — DEXAMETHASONE 6 MG/1
6 TABLET ORAL DAILY
Qty: 5 TABLET | Refills: 0 | Status: SHIPPED | OUTPATIENT
Start: 2021-07-06 | End: 2021-08-05

## 2021-07-05 RX ADMIN — SIMVASTATIN 40 MG: 40 TABLET, FILM COATED ORAL at 08:53

## 2021-07-05 RX ADMIN — DEXAMETHASONE 6 MG: 2 TABLET ORAL at 08:52

## 2021-07-05 RX ADMIN — REMDESIVIR 100 MG: 100 INJECTION, POWDER, LYOPHILIZED, FOR SOLUTION INTRAVENOUS at 15:00

## 2021-07-05 RX ADMIN — GABAPENTIN 600 MG: 300 CAPSULE ORAL at 08:53

## 2021-07-05 RX ADMIN — HYDRALAZINE HYDROCHLORIDE 10 MG: 20 INJECTION INTRAMUSCULAR; INTRAVENOUS at 03:27

## 2021-07-05 RX ADMIN — FLUTICASONE FUROATE 1 PUFF: 200 POWDER RESPIRATORY (INHALATION) at 08:55

## 2021-07-05 RX ADMIN — GUAIFENESIN AND DEXTROMETHORPHAN 10 ML: 100; 10 SYRUP ORAL at 17:36

## 2021-07-05 RX ADMIN — MEMANTINE HYDROCHLORIDE 28 MG: 28 CAPSULE, EXTENDED RELEASE ORAL at 08:53

## 2021-07-05 RX ADMIN — LISINOPRIL 40 MG: 40 TABLET ORAL at 08:53

## 2021-07-05 RX ADMIN — METOPROLOL SUCCINATE 25 MG: 25 TABLET, EXTENDED RELEASE ORAL at 08:53

## 2021-07-05 RX ADMIN — CITALOPRAM HYDROBROMIDE 20 MG: 20 TABLET ORAL at 09:38

## 2021-07-05 RX ADMIN — LEVOTHYROXINE SODIUM 88 MCG: 0.09 TABLET ORAL at 08:53

## 2021-07-05 RX ADMIN — BENZONATATE 100 MG: 100 CAPSULE ORAL at 05:08

## 2021-07-05 RX ADMIN — GABAPENTIN 600 MG: 300 CAPSULE ORAL at 16:27

## 2021-07-05 RX ADMIN — CYCLOBENZAPRINE HYDROCHLORIDE 10 MG: 5 TABLET, FILM COATED ORAL at 08:53

## 2021-07-05 ASSESSMENT — ACTIVITIES OF DAILY LIVING (ADL)
ADLS_ACUITY_SCORE: 32
ADLS_ACUITY_SCORE: 28
ADLS_ACUITY_SCORE: 32
ADLS_ACUITY_SCORE: 32
ADLS_ACUITY_SCORE: 28

## 2021-07-05 NOTE — PROVIDER NOTIFICATION
MD updated: pt B/P continues to be high but when retaken does not meet parameters for hydralazine. HR has been 48-59 while sleeping. Pt continues to be asymptomatic.    mild impairment

## 2021-07-05 NOTE — DISCHARGE SUMMARY
[facility]    Discharge Summary  Hospitalist    Date of Admission:  7/1/2021  Date of Discharge:  7/5/2021  Provider:  Alexander Espino MD  Date of Service (when I last saw the patient): 07/05/21    Discharge Diagnoses   COVID-19 infection  Bilateral pneumonia due to COVID-19 infection  Metabolic encephalopathy in the context of Covid pneumonia aggravating underlying cognitive impairment  Acute hyperkalemia on admission.   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    Chronic normocytic normochromic anemia of chronic disease   CKD Stage IIIa.  Stable.       Other medical issues:  Past Medical History:   Diagnosis Date     Arthritis      ASCVD (arteriosclerotic cardiovascular disease)     carotid surgery     DM II (diabetes mellitus, type II), controlled (H)      HTN (hypertension)      hypothyroidism      Intertrochanteric fracture of left femur (H) 2012    no surgical repair     Osteoporosis      Retina disorder, left     hemorrhage     Seasonal allergies      Thyroid disease        History of Present Illness   Nhi Perry is an 89 year old female who presented with worsening of shortness of breath.  Please see the admission history and physical for full details.    Hospital Course   Nhi Perry is a 89 year old female admitted on 7/1/2021. She became Covid positive for about a week before admission and came in with shortness of breath and infiltrate seen on chest x-ray bilaterally in her lungs.   She has been treated per protocol with remdesivir and Decadron as well as symptomatic treatment.  She has had minimal oxygen necessities and has been keeping oxygen saturation on room air.  Afebrile with moderate dry cough but no other complication.  She will complete Decadron at home and one month of Rivaroxavan 10 mg 1 tab daily for ONE month. No ASA while on DOAC.        COVID-19  infection  Bilateral pneumonia due to COVID-19 infection  Remdesivir 5/5 completed. Dexamethasone per protocol day 5/10  Anticoagulation w/enoxaparin  Monitor oxygen needs, supplement as needed     Metabolic encephalopathy  Probably on the basis of Covid pneumonia aggravating underlying cognitive impairment  Monitor closely.  Haldol as needed for agitation.     Acute hyperkalemia on admission.   BMP daily  K is normal     Hypertension  lisinopril 20 mg daily  SBP is high, I suspect Decadron is contributing, She is not symptomatic.        Type 2 diabetes mellitus  Metformin on hold.   Lantus 14 international unit(s) hs   Sliding scale insulin high intensity.     Hypothyroidism  Thyroxine as prior to admission     Hypercholesteremia  Simvastatin      Remote history of tuberculosis, fully treated per patient report many years ago in the [place]. This makes more sense to CT findings initially thought to be related to Asbestos.      Chronic normocytic normochromic anemia, likely from chronic disease.     CKD Stage IIIa.  Stable.  Follow up.     # Discharge Pain Plan:    - Patient currently has NO PAIN and is not being prescribed pain medications on discharge.      Significant Results and Procedures   See below    Pending Results      Unresulted Labs Ordered in the Past 30 Days of this Admission     No orders found from 6/1/2021 to 7/2/2021.          Code Status   Full Code       Primary Care Physician   Physician No Ref-Primary    GEN:  Alert, oriented x 3, appears comfortable, NAD.  HEENT:  Normocephalic/atraumatic, no scleral icterus, no nasal discharge, mouth moist.  CV:  Regular rate and rhythm, no murmur or JVD.  S1 + S2 noted, no S3 or S4.  LUNGS:  Clear to auscultation bilaterally without rales/rhonchi/wheezing/retractions.  Symmetric chest rise on inhalation noted.  ABD:  Active bowel sounds, soft, non-tender/non-distended.  No rebound/guarding/rigidity.  EXT:  No edema or cyanosis.  No joint  synovitis noted.  SKIN:  Dry to touch, no exanthems noted in the visualized areas.     Discharge Disposition   Discharged to home    Consultations This Hospital Stay   CARE MANAGEMENT / SOCIAL WORK IP CONSULT  PHYSICAL THERAPY ADULT IP CONSULT    Time Spent on this Encounter   I, Alexander Espino MD, personally saw the patient today and spent greater than 30 minutes discharging this patient.     Discharge Orders      Reason for your hospital stay    Covid 19 pneumonia     Follow-up and recommended labs and tests     Follow up with primary care provider, Physician No Ref-Primary, within 7 days for hospital follow- up.  The following labs/tests are recommended: none.  Use a mask, cover your cough and keep physical distance with family members as recommended     Activity    Your activity upon discharge: activity as tolerated     Full Code     Diet    Follow this diet upon discharge: Orders Placed This Encounter      Combination Diet Regular Diet Adult     Discharge Medications   Current Discharge Medication List      START taking these medications    Details   dexamethasone (DECADRON) 6 MG tablet Take 1 tablet (6 mg) by mouth daily for 5 days  Qty: 5 tablet, Refills: 0    Comments: Future refills by PCP  Physician No Ref-Primary with phone number None.  Associated Diagnoses: Pneumonia due to 2019 novel coronavirus      guaiFENesin-dextromethorphan (ROBITUSSIN DM) 100-10 MG/5ML syrup Take 10 mLs by mouth 4 times daily as needed for cough  Qty: 236 mL, Refills: 0    Comments: Future refills by PCP Dr. Physician No Ref-Primary with phone number None.  Associated Diagnoses: Pneumonia due to 2019 novel coronavirus      rivaroxaban ANTICOAGULANT (XARELTO) 10 MG TABS tablet Take 1 tablet (10 mg) by mouth daily (with dinner)  Qty: 30 tablet, Refills: 0    Associated Diagnoses: Pneumonia due to 2019 novel coronavirus         CONTINUE these medications which have NOT CHANGED    Details   ACETAMINOPHEN PO Take 650 mg by mouth  every 8 hours as needed for pain       albuterol (PROAIR HFA/PROVENTIL HFA/VENTOLIN HFA) 108 (90 Base) MCG/ACT inhaler Inhale 2 puffs into the lungs every 6 hours as needed for shortness of breath / dyspnea or wheezing  Qty: 6.7 g, Refills: 0      bisacodyl (DULCOLAX) 10 MG suppository Place 1 suppository rectally daily as needed.  Qty: 12 suppository, Refills: 3    Associated Diagnoses: Constipation      celecoxib (CELEBREX) 200 MG capsule Take 200 mg by mouth daily as needed for moderate pain      Cetirizine HCl (ZYRTEC ALLERGY PO) Take 10 mg by mouth daily as needed.      citalopram (CELEXA) 20 MG tablet Take 20 mg by mouth daily      cyclobenzaprine (FLEXERIL) 10 MG tablet Take 10 mg by mouth daily      donepezil (ARICEPT) 10 MG tablet Take 10 mg by mouth At Bedtime      fluocinolone (SYNALAR) 0.025 % cream Apply topically 2 times daily      fluticasone (FLONASE) 50 MCG/ACT nasal spray Spray 2 sprays into both nostrils daily as needed for rhinitis or allergies      fluticasone (FLOVENT HFA) 220 MCG/ACT inhaler Inhale 2 puffs into the lungs 2 times daily      gabapentin (NEURONTIN) 300 MG capsule Take 600 mg by mouth 3 times daily      HYDROcodone-acetaminophen (NORCO) 5-325 MG tablet Take 1 tablet by mouth nightly as needed for severe pain      levothyroxine (SYNTHROID/LEVOTHROID) 88 MCG tablet Take 88 mcg by mouth daily      lisinopril (ZESTRIL) 40 MG tablet Take 40 mg by mouth daily      memantine XR (NAMENDA XR) 28 MG 24 hr capsule Take 28 mg by mouth daily      metFORMIN (GLUCOPHAGE) 500 MG tablet Take 1 tablet by mouth 2 times daily (with meals).  Qty: 180 tablet, Refills: 4    Associated Diagnoses: Type 2 diabetes, HbA1c goal < 7% (H)      metoprolol succinate ER (TOPROL-XL) 25 MG 24 hr tablet Take 25 mg by mouth daily      senna-docusate (SENOKOT-S/PERICOLACE) 8.6-50 MG tablet Take 2 tablets by mouth 2 times daily as needed for constipation      SIMVASTATIN PO Take 40 mg by mouth daily.      zolpidem  (AMBIEN) 5 MG tablet Take 5 mg by mouth nightly as needed for sleep      Olopatadine HCl (PATANOL OP) Place 1 drop into both eyes as needed         STOP taking these medications       aspirin 81 MG tablet Comments:   Reason for Stopping:             Allergies   Allergies   Allergen Reactions     Seasonal Allergies      Data   Most Recent 3 CBC's:  Recent Labs   Lab Test 07/05/21  0643 07/04/21  0721 07/03/21  0744   WBC 7.8 6.7 7.0   HGB 10.8* 9.6* 9.5*   MCV 92 94 96    210 226      Most Recent 3 BMP's:  Recent Labs   Lab Test 07/05/21  0643 07/04/21  0721 07/03/21  0744    135 137   POTASSIUM 4.7 5.0 5.3   CHLORIDE 106 108 108   CO2 24 26 23   BUN 32* 36* 36*   CR 0.85 0.97 1.01   ANIONGAP 5 1* 6   CHRIS 8.8 8.6 8.3*   * 144* 197*     Most Recent 2 LFT's:  Recent Labs   Lab Test 07/01/21  1844 07/01/21  1242   AST 30 35   ALT 57* 66*   ALKPHOS 94 102   BILITOTAL 0.2 0.4     Most Recent INR's and Anticoagulation Dosing History:  Anticoagulation Dose History     Recent Dosing and Labs Latest Ref Rng & Units 7/1/2021    INR 0.86 - 1.14 1.14        Most Recent 3 Troponin's:  Recent Labs   Lab Test 07/01/21  1844 07/27/14  1608   TROPI <0.015 <0.012     Most Recent Cholesterol Panel:No lab results found.  Most Recent 6 Bacteria Isolates From Any Culture (See EPIC Reports for Culture Details):No lab results found.  Most Recent TSH, T4 and A1c Labs:  Recent Labs   Lab Test 07/01/21  1844 07/01/21  1242 07/27/14  1608 07/27/14  1608   TSH  --  1.08  --  10.40*   T4  --   --   --  0.73   A1C 6.8*  --    < >  --     < > = values in this interval not displayed.     Results for orders placed or performed during the hospital encounter of 07/01/21   XR Chest Port 1 View    Narrative    CHEST ONE VIEW PORTABLE   7/1/2021 1:05 PM     HISTORY:  Shortness of breath.    COMPARISON: 6/22/2021.      Impression    IMPRESSION: Hazy infiltrates in both lower lungs are new since the  previous exam. These findings are  nonspecific, and may be related to  infection and/or edema. No other significant interval change. Heart  size appears stable. Pulmonary vascularity is within normal limits.  Pleural calcification is again noted bilaterally, greater on the left.  Scattered calcified granulomas in both lungs. Aortic calcification.     MARCO A BUNCH MD          SYSTEM ID:  [id number]   CT Chest Pulmonary Embolism w Contrast    Narrative    CT CHEST PULMONARY EMBOLISM WITH CONTRAST  7/1/2021 2:26 PM    CLINICAL HISTORY: Dyspnea. Positive d-dimer.    TECHNIQUE: CT angiogram chest during arterial phase injection IV  contrast. 2D and 3D MIP reconstructions were performed by the CT  technologist. Dose reduction techniques were used.     CONTRAST: 64 mL Isovue-[id number]    COMPARISON: None.    FINDINGS:  ANGIOGRAM CHEST: Significant respiratory motion artifact is present.  No large central pulmonary emboli are appreciated, but peripheral  arteries are obscured by artifact. Thoracic aorta is negative for  dissection. No CT evidence of right heart strain.    LUNGS AND PLEURA: Calcified granulomas are noted within the lungs  bilaterally along with dense calcified pleural plaque anterior upper  lobe and lateral left upper lobe and lingula. No pleural effusions. No  obvious infiltrate or consolidation allowing for significant  respiratory motion artifact.    MEDIASTINUM/AXILLAE: Heart is normal in size. No pericardial fluid.  Esophagus is unremarkable. No significant lymph node enlargement  allowing for motion artifact.    UPPER ABDOMEN: Normal.    MUSCULOSKELETAL: Degenerative spine changes.      Impression    IMPRESSION:  1.  Limited exam due to respiratory motion artifact. No definite  evidence of central pulmonary emboli. Thoracic aorta is unremarkable.    2.  Evidence of old granulomatous disease with large calcified nodules  in each lung and calcified pleural plaque involving the upper lobes as  described. Findings could reflect previous  asbestos exposure.    VIANEY OCHOA MD          SYSTEM ID:  [id number]     Disclaimer: This note consists of symbols derived from keyboarding, dictation and/or voice recognition software. As a result, there may be errors in the script that have gone undetected. Please consider this when interpreting information found in this chart.

## 2021-07-05 NOTE — PROVIDER NOTIFICATION
Dr. Espino notified of inability to gain IV access to complete Remdesevir infusion prior to discharge.

## 2021-07-05 NOTE — PROGRESS NOTES
Cross cover:  Notified by nursing service regarding increasing blood pressure levels  Chart reviewed.  Earlier progress notes stated likely Decadron is contributory  On lisinopril, metoprolol  Reported to be asymptomatic  Added as needed Apresoline

## 2021-07-05 NOTE — PROGRESS NOTES
Care Management Discharge Note    Discharge Date: 07/06/21       Discharge Disposition: Home Care    Discharge Services:  NA    Discharge DME:  NA    Discharge Transportation: agency at 1730    Private pay costs discussed: transportation costs    PAS Confirmation Code:    Patient/family educated on Medicare website which has current facility and service quality ratings:      Education Provided on the Discharge Plan:    Persons Notified of Discharge Plans: Home   Patient/Family in Agreement with the Plan:      Handoff Referral Completed: Yes    Additional Information:  Spoke with daughter and discussed stretcher transport cost. Daughter noted her insurance usually covers transport so she okay with cost. Informed daughter will schedule transport for around 1700.   Scheduled transport for 1730        Andreia Gomez, [facility]  Care Management   [phone]

## 2021-07-05 NOTE — PLAN OF CARE
Vss A&O to person place. Forgetful. Alarms on. Does not attempt to get oob. Bg 286 and 307. Insulin given. Assist with feeding. LS clear/dim. Sats 92-93% on RA.  Up with a2 and lizet steady. Buttocks red blanchable. Encouraged repo. Side to side and up to chair in recline. Skin folds mehran and moist. Cleansed and cloth applied. Probable PT to home around 5

## 2021-07-05 NOTE — PLAN OF CARE
Assumed care of pt from [id number]. VSS. A&Ox4. Apical pulse regular. Lungs diminished. Bowel sounds active in all quadrants. Tolerating diet. Voiding spontaneously with adequate output. PIV removed due to pain and leaking, unable to obtain new IV access prior to discharge to complete Remdesevir infusion. MD notified. No pain to report. Pt's family called and given discharge instructions on AVS, AVS printed and sent home with patient via stretcher transport as well as home medications sent with patient. No other concerns at this time.

## 2021-07-05 NOTE — PLAN OF CARE
Alert and confused. BP elevated, MD euceda and hydralazine prn ordered. Bradycardic 48-59. On RA. Continues to cough, prn Tessalon given. O2 sats WNL. Refused to get OOB. PT consult in. RPIV SL.     Treatment Plan: Decadron, Lovenox, Remdesivir, monitor respiratory status

## 2021-07-05 NOTE — PROGRESS NOTES
"   07/05/21 1537   Quick Adds   Type of Visit Initial PT Evaluation       Present yes   Language Ethiopian   Living Environment   People in home child(zeenat), adult   Current Living Arrangements house   Self-Care   Equipment Currently Used at Home wheelchair, manual   Activity/Exercise/Self-Care Comment Per LARRY \"Pt lives with Shana and Shana's  Erin who are pt's caregivers. Shana is covid positive and so is her  (also a [facility] pt). Per Shana pt wants to go home at discharge and is adamant about not wanting to go to a SNF. Shana is agreeable to pt returning home with home care. SW explained home care vs private pay care.\" This PT called pt's dtg to discuss her transfers at home. Per dtg usually transfers with A x 1 from her spouse or her. Pt reports she is unable to help pt with stairs as  performs this but is able to help her mom transfer. Pt dtg educated that PT to assess to be sure pt is able to transfer as had been transfers with heavy A x 2 with lizet nash.   General Information   Onset of Illness/Injury or Date of Surgery 07/01/21   Referring Physician Dr. Espino   Patient/Family Therapy Goals Statement (PT) Pt wants to DC home   Pertinent History of Current Problem (include personal factors and/or comorbidities that impact the POC) Nhi Perry is a 89 year old female admitted on 7/1/2021. She became Covid positive for about a week before admission and came in with shortness of breath and infiltrate seen on chest x-ray bilaterally in her lungs.   She has been treated per protocol with remdesivir and Decadron as well as symptomatic treatment.  She has had minimal oxygen necessities and has been keeping oxygen saturation on room air.  Afebrile with moderate dry cough but no other complication.   Existing Precautions/Restrictions fall   Cognition   Orientation Status (Cognition) person;place   Affect/Mental Status (Cognition) agitated   Follows Commands " (Cognition) increased processing time needed   Behavioral Issues overwhelmed easily   Posture    Posture Forward head position;Protracted shoulders   Strength   Manual Muscle Testing Quick Adds Deficits observed during functional mobility   Bed Mobility   Comment (Bed Mobility) NT, declining this   Transfers   Transfer Safety Comments Pt was cued for recliner to W/C, Min A x 1 with recliner and W/C angled stand pivot transfer, pt in trunk flexion   Gait/Stairs (Locomotion)   Comment (Gait/Stairs) Able to take 2-3 steps to chair   Balance   Balance Comments fair standing balance with B UE support on W/C   Clinical Impression   Criteria for Skilled Therapeutic Intervention evaluation only;no problems identified which require skilled intervention   PT Diagnosis (PT) decreased functional mobility   Influenced by the following impairments decreased strength   Functional limitations due to impairments decreased performance of ADLs   Clinical Presentation Stable/Uncomplicated   Clinical Presentation Rationale DC home today   Clinical Decision Making (Complexity) low complexity   Therapy Frequency (PT) Evaluation only   Predicted Duration of Therapy Intervention (days/wks) eval only   Risk & Benefits of therapy have been explained evaluation/treatment results reviewed;care plan/treatment goals reviewed;risks/benefits reviewed;current/potential barriers reviewed;participants voiced agreement with care plan;participants included;patient;daughter   PT Discharge Planning    PT Discharge Recommendation (DC Rec) home with home care physical therapy;home with assist   PT Rationale for DC Rec Pt was seen for PT to assess transfer status due to pt requiring A x 2 with lizet nash with RNs for mobility. At home pt typically transfers with A x 1 and pt's son in law is currently hospitalized so unavailble if A x 2 is needed. Pt was able to transfer using a stand pivot technique with assist for set up only, but able to direct this  transfer. Recommend home with home PT for improving endurance and stair climbing as plan at this time is to perform stretcher transport.    Total Evaluation Time   Total Evaluation Time (Minutes) 25

## 2021-07-11 ENCOUNTER — APPOINTMENT (OUTPATIENT)
Dept: GENERAL RADIOLOGY | Facility: CLINIC | Age: 86
DRG: 194 | End: 2021-07-11
Attending: EMERGENCY MEDICINE
Payer: COMMERCIAL

## 2021-07-11 ENCOUNTER — HOSPITAL ENCOUNTER (INPATIENT)
Facility: CLINIC | Age: 86
LOS: 5 days | Discharge: HOME HEALTH | DRG: 194 | End: 2021-07-16
Attending: EMERGENCY MEDICINE | Admitting: HOSPITALIST
Payer: COMMERCIAL

## 2021-07-11 DIAGNOSIS — J15.9 BACTERIAL PNEUMONIA: Primary | ICD-10-CM

## 2021-07-11 DIAGNOSIS — J12.82 PNEUMONIA DUE TO 2019 NOVEL CORONAVIRUS: ICD-10-CM

## 2021-07-11 DIAGNOSIS — U07.1 PNEUMONIA DUE TO 2019 NOVEL CORONAVIRUS: ICD-10-CM

## 2021-07-11 LAB
ANION GAP SERPL CALCULATED.3IONS-SCNC: 11 MMOL/L (ref 3–14)
BASOPHILS # BLD AUTO: 0 10E3/UL (ref 0–0.2)
BASOPHILS NFR BLD AUTO: 0 %
BUN SERPL-MCNC: 48 MG/DL (ref 7–30)
CALCIUM SERPL-MCNC: 8.8 MG/DL (ref 8.5–10.1)
CHLORIDE BLD-SCNC: 101 MMOL/L (ref 94–109)
CO2 SERPL-SCNC: 23 MMOL/L (ref 20–32)
CREAT SERPL-MCNC: 1.2 MG/DL (ref 0.52–1.04)
D DIMER PPP FEU-MCNC: 0.61 UG/ML FEU (ref 0–0.5)
EOSINOPHIL # BLD AUTO: 0.2 10E3/UL (ref 0–0.7)
EOSINOPHIL NFR BLD AUTO: 1 %
ERYTHROCYTE [DISTWIDTH] IN BLOOD BY AUTOMATED COUNT: 12.4 % (ref 10–15)
GFR SERPL CREATININE-BSD FRML MDRD: 39 ML/MIN/1.73M2
GLUCOSE BLD-MCNC: 274 MG/DL (ref 70–99)
HCT VFR BLD AUTO: 34.1 % (ref 35–47)
HGB BLD-MCNC: 11.1 G/DL (ref 11.7–15.7)
IMM GRANULOCYTES # BLD: 0.5 10E3/UL
IMM GRANULOCYTES NFR BLD: 4 %
INTERPRETATION ECG - MUSE: NORMAL
LYMPHOCYTES # BLD AUTO: 1.8 10E3/UL (ref 0.8–5.3)
LYMPHOCYTES NFR BLD AUTO: 14 %
MCH RBC QN AUTO: 30.2 PG (ref 26.5–33)
MCHC RBC AUTO-ENTMCNC: 32.6 G/DL (ref 31.5–36.5)
MCV RBC AUTO: 93 FL (ref 78–100)
MONOCYTES # BLD AUTO: 0.8 10E3/UL (ref 0–1.3)
MONOCYTES NFR BLD AUTO: 6 %
NEUTROPHILS # BLD AUTO: 8.9 10E3/UL (ref 1.6–8.3)
NEUTROPHILS NFR BLD AUTO: 75 %
NRBC # BLD AUTO: 0 10E3/UL
NRBC BLD AUTO-RTO: 0 /100
PLATELET # BLD AUTO: 258 10E3/UL (ref 150–450)
POTASSIUM BLD-SCNC: 4.4 MMOL/L (ref 3.4–5.3)
PROCALCITONIN SERPL-MCNC: <0.05 NG/ML
RBC # BLD AUTO: 3.67 10E6/UL (ref 3.8–5.2)
SODIUM SERPL-SCNC: 135 MMOL/L (ref 133–144)
TROPONIN I SERPL-MCNC: <0.015 UG/L (ref 0–0.04)
WBC # BLD AUTO: 12.1 10E3/UL (ref 4–11)

## 2021-07-11 PROCEDURE — 36592 COLLECT BLOOD FROM PICC: CPT | Performed by: EMERGENCY MEDICINE

## 2021-07-11 PROCEDURE — 258N000003 HC RX IP 258 OP 636: Performed by: EMERGENCY MEDICINE

## 2021-07-11 PROCEDURE — 71045 X-RAY EXAM CHEST 1 VIEW: CPT

## 2021-07-11 PROCEDURE — 96361 HYDRATE IV INFUSION ADD-ON: CPT

## 2021-07-11 PROCEDURE — 96366 THER/PROPH/DIAG IV INF ADDON: CPT

## 2021-07-11 PROCEDURE — 36415 COLL VENOUS BLD VENIPUNCTURE: CPT | Performed by: EMERGENCY MEDICINE

## 2021-07-11 PROCEDURE — 85379 FIBRIN DEGRADATION QUANT: CPT | Performed by: EMERGENCY MEDICINE

## 2021-07-11 PROCEDURE — 85025 COMPLETE CBC W/AUTO DIFF WBC: CPT | Performed by: EMERGENCY MEDICINE

## 2021-07-11 PROCEDURE — 250N000011 HC RX IP 250 OP 636: Performed by: EMERGENCY MEDICINE

## 2021-07-11 PROCEDURE — 99285 EMERGENCY DEPT VISIT HI MDM: CPT | Mod: 25

## 2021-07-11 PROCEDURE — 93005 ELECTROCARDIOGRAM TRACING: CPT

## 2021-07-11 PROCEDURE — 36415 COLL VENOUS BLD VENIPUNCTURE: CPT

## 2021-07-11 PROCEDURE — 96365 THER/PROPH/DIAG IV INF INIT: CPT

## 2021-07-11 PROCEDURE — 84145 PROCALCITONIN (PCT): CPT | Performed by: EMERGENCY MEDICINE

## 2021-07-11 PROCEDURE — 99223 1ST HOSP IP/OBS HIGH 75: CPT | Mod: AI | Performed by: HOSPITALIST

## 2021-07-11 PROCEDURE — 80048 BASIC METABOLIC PNL TOTAL CA: CPT | Performed by: EMERGENCY MEDICINE

## 2021-07-11 PROCEDURE — 87040 BLOOD CULTURE FOR BACTERIA: CPT | Performed by: EMERGENCY MEDICINE

## 2021-07-11 PROCEDURE — 120N000001 HC R&B MED SURG/OB

## 2021-07-11 PROCEDURE — 84484 ASSAY OF TROPONIN QUANT: CPT | Performed by: EMERGENCY MEDICINE

## 2021-07-11 RX ORDER — PIPERACILLIN SODIUM, TAZOBACTAM SODIUM 4; .5 G/20ML; G/20ML
4.5 INJECTION, POWDER, LYOPHILIZED, FOR SOLUTION INTRAVENOUS ONCE
Status: COMPLETED | OUTPATIENT
Start: 2021-07-11 | End: 2021-07-12

## 2021-07-11 RX ORDER — SODIUM CHLORIDE 9 MG/ML
INJECTION, SOLUTION INTRAVENOUS CONTINUOUS
Status: DISCONTINUED | OUTPATIENT
Start: 2021-07-11 | End: 2021-07-13

## 2021-07-11 RX ORDER — MOMETASONE FUROATE 200 UG/1
2 AEROSOL RESPIRATORY (INHALATION) 2 TIMES DAILY
Status: ON HOLD | COMMUNITY
End: 2021-08-06

## 2021-07-11 RX ADMIN — SODIUM CHLORIDE: 9 INJECTION, SOLUTION INTRAVENOUS at 22:34

## 2021-07-11 RX ADMIN — SODIUM CHLORIDE 1000 ML: 9 INJECTION, SOLUTION INTRAVENOUS at 20:46

## 2021-07-11 RX ADMIN — PIPERACILLIN SODIUM AND TAZOBACTAM SODIUM 4.5 G: 4; .5 INJECTION, POWDER, LYOPHILIZED, FOR SOLUTION INTRAVENOUS at 22:34

## 2021-07-11 ASSESSMENT — ENCOUNTER SYMPTOMS
COUGH: 1
NAUSEA: 0
DIARRHEA: 0
VOMITING: 0

## 2021-07-12 ENCOUNTER — APPOINTMENT (OUTPATIENT)
Dept: CT IMAGING | Facility: CLINIC | Age: 86
DRG: 194 | End: 2021-07-12
Attending: HOSPITALIST
Payer: COMMERCIAL

## 2021-07-12 LAB
ALBUMIN SERPL-MCNC: 2 G/DL (ref 3.4–5)
ALBUMIN UR-MCNC: NEGATIVE MG/DL
ALP SERPL-CCNC: 69 U/L (ref 40–150)
ALT SERPL W P-5'-P-CCNC: 47 U/L (ref 0–50)
ANION GAP SERPL CALCULATED.3IONS-SCNC: 8 MMOL/L (ref 3–14)
APPEARANCE UR: CLEAR
AST SERPL W P-5'-P-CCNC: 34 U/L (ref 0–45)
BACTERIA #/AREA URNS HPF: ABNORMAL /HPF
BASOPHILS # BLD AUTO: 0 10E3/UL (ref 0–0.2)
BASOPHILS NFR BLD AUTO: 0 %
BILIRUB SERPL-MCNC: 0.5 MG/DL (ref 0.2–1.3)
BILIRUB UR QL STRIP: NEGATIVE
BUN SERPL-MCNC: 42 MG/DL (ref 7–30)
CALCIUM SERPL-MCNC: 8 MG/DL (ref 8.5–10.1)
CHLORIDE BLD-SCNC: 109 MMOL/L (ref 94–109)
CO2 SERPL-SCNC: 19 MMOL/L (ref 20–32)
COLOR UR AUTO: ABNORMAL
CREAT SERPL-MCNC: 1.06 MG/DL (ref 0.52–1.04)
EOSINOPHIL # BLD AUTO: 0.2 10E3/UL (ref 0–0.7)
EOSINOPHIL NFR BLD AUTO: 2 %
ERYTHROCYTE [DISTWIDTH] IN BLOOD BY AUTOMATED COUNT: 12.6 % (ref 10–15)
GFR SERPL CREATININE-BSD FRML MDRD: 45 ML/MIN/1.73M2
GLUCOSE BLD-MCNC: 110 MG/DL (ref 70–99)
GLUCOSE BLDC GLUCOMTR-MCNC: 121 MG/DL (ref 70–99)
GLUCOSE BLDC GLUCOMTR-MCNC: 147 MG/DL (ref 70–99)
GLUCOSE BLDC GLUCOMTR-MCNC: 147 MG/DL (ref 70–99)
GLUCOSE UR STRIP-MCNC: NEGATIVE MG/DL
HCT VFR BLD AUTO: 34.4 % (ref 35–47)
HGB BLD-MCNC: 10.8 G/DL (ref 11.7–15.7)
HGB UR QL STRIP: NEGATIVE
HOLD SPECIMEN: NORMAL
IMM GRANULOCYTES # BLD: 0.3 10E3/UL
IMM GRANULOCYTES NFR BLD: 4 %
KETONES UR STRIP-MCNC: NEGATIVE MG/DL
LEUKOCYTE ESTERASE UR QL STRIP: ABNORMAL
LYMPHOCYTES # BLD AUTO: 1.4 10E3/UL (ref 0.8–5.3)
LYMPHOCYTES NFR BLD AUTO: 18 %
MCH RBC QN AUTO: 29.6 PG (ref 26.5–33)
MCHC RBC AUTO-ENTMCNC: 31.4 G/DL (ref 31.5–36.5)
MCV RBC AUTO: 94 FL (ref 78–100)
MONOCYTES # BLD AUTO: 0.5 10E3/UL (ref 0–1.3)
MONOCYTES NFR BLD AUTO: 6 %
NEUTROPHILS # BLD AUTO: 5.3 10E3/UL (ref 1.6–8.3)
NEUTROPHILS NFR BLD AUTO: 70 %
NITRATE UR QL: NEGATIVE
NRBC # BLD AUTO: 0 10E3/UL
NRBC BLD AUTO-RTO: 0 /100
PH UR STRIP: 6 [PH] (ref 5–7)
PLATELET # BLD AUTO: 197 10E3/UL (ref 150–450)
POTASSIUM BLD-SCNC: 4.3 MMOL/L (ref 3.4–5.3)
PROCALCITONIN SERPL-MCNC: <0.05 NG/ML
PROT SERPL-MCNC: 5.9 G/DL (ref 6.8–8.8)
RBC # BLD AUTO: 3.65 10E6/UL (ref 3.8–5.2)
RBC URINE: 8 /HPF
SODIUM SERPL-SCNC: 136 MMOL/L (ref 133–144)
SP GR UR STRIP: 1.02 (ref 1–1.03)
SQUAMOUS EPITHELIAL: 3 /HPF
UROBILINOGEN UR STRIP-MCNC: NORMAL MG/DL
WBC # BLD AUTO: 7.6 10E3/UL (ref 4–11)
WBC URINE: 1 /HPF

## 2021-07-12 PROCEDURE — 250N000011 HC RX IP 250 OP 636: Performed by: HOSPITALIST

## 2021-07-12 PROCEDURE — 82962 GLUCOSE BLOOD TEST: CPT

## 2021-07-12 PROCEDURE — 85025 COMPLETE CBC W/AUTO DIFF WBC: CPT | Performed by: HOSPITALIST

## 2021-07-12 PROCEDURE — 87040 BLOOD CULTURE FOR BACTERIA: CPT | Performed by: EMERGENCY MEDICINE

## 2021-07-12 PROCEDURE — 81003 URINALYSIS AUTO W/O SCOPE: CPT | Performed by: EMERGENCY MEDICINE

## 2021-07-12 PROCEDURE — 80053 COMPREHEN METABOLIC PANEL: CPT | Performed by: HOSPITALIST

## 2021-07-12 PROCEDURE — 250N000009 HC RX 250: Performed by: HOSPITALIST

## 2021-07-12 PROCEDURE — 250N000013 HC RX MED GY IP 250 OP 250 PS 637: Performed by: HOSPITALIST

## 2021-07-12 PROCEDURE — 36415 COLL VENOUS BLD VENIPUNCTURE: CPT | Performed by: EMERGENCY MEDICINE

## 2021-07-12 PROCEDURE — 999N000157 HC STATISTIC RCP TIME EA 10 MIN

## 2021-07-12 PROCEDURE — 120N000001 HC R&B MED SURG/OB

## 2021-07-12 PROCEDURE — 87086 URINE CULTURE/COLONY COUNT: CPT | Performed by: EMERGENCY MEDICINE

## 2021-07-12 PROCEDURE — 71275 CT ANGIOGRAPHY CHEST: CPT

## 2021-07-12 PROCEDURE — 250N000012 HC RX MED GY IP 250 OP 636 PS 637: Performed by: HOSPITALIST

## 2021-07-12 PROCEDURE — 94640 AIRWAY INHALATION TREATMENT: CPT

## 2021-07-12 PROCEDURE — 84145 PROCALCITONIN (PCT): CPT | Performed by: HOSPITALIST

## 2021-07-12 PROCEDURE — 99233 SBSQ HOSP IP/OBS HIGH 50: CPT | Performed by: HOSPITALIST

## 2021-07-12 PROCEDURE — 36415 COLL VENOUS BLD VENIPUNCTURE: CPT | Performed by: HOSPITALIST

## 2021-07-12 RX ORDER — ZOLPIDEM TARTRATE 5 MG/1
5 TABLET ORAL
Status: DISCONTINUED | OUTPATIENT
Start: 2021-07-12 | End: 2021-07-15

## 2021-07-12 RX ORDER — NALOXONE HYDROCHLORIDE 0.4 MG/ML
0.4 INJECTION, SOLUTION INTRAMUSCULAR; INTRAVENOUS; SUBCUTANEOUS
Status: DISCONTINUED | OUTPATIENT
Start: 2021-07-12 | End: 2021-07-16 | Stop reason: HOSPADM

## 2021-07-12 RX ORDER — FLUOCINOLONE ACETONIDE 0.25 MG/G
OINTMENT TOPICAL 2 TIMES DAILY
Status: DISCONTINUED | OUTPATIENT
Start: 2021-07-12 | End: 2021-07-16 | Stop reason: HOSPADM

## 2021-07-12 RX ORDER — LISINOPRIL 40 MG/1
40 TABLET ORAL DAILY
Status: DISCONTINUED | OUTPATIENT
Start: 2021-07-12 | End: 2021-07-16 | Stop reason: HOSPADM

## 2021-07-12 RX ORDER — ONDANSETRON 2 MG/ML
4 INJECTION INTRAMUSCULAR; INTRAVENOUS EVERY 6 HOURS PRN
Status: DISCONTINUED | OUTPATIENT
Start: 2021-07-12 | End: 2021-07-16 | Stop reason: HOSPADM

## 2021-07-12 RX ORDER — ACETAMINOPHEN 325 MG/1
650 TABLET ORAL EVERY 6 HOURS PRN
Status: DISCONTINUED | OUTPATIENT
Start: 2021-07-12 | End: 2021-07-16 | Stop reason: HOSPADM

## 2021-07-12 RX ORDER — NICOTINE POLACRILEX 4 MG
15-30 LOZENGE BUCCAL
Status: DISCONTINUED | OUTPATIENT
Start: 2021-07-12 | End: 2021-07-16 | Stop reason: HOSPADM

## 2021-07-12 RX ORDER — NALOXONE HYDROCHLORIDE 0.4 MG/ML
0.2 INJECTION, SOLUTION INTRAMUSCULAR; INTRAVENOUS; SUBCUTANEOUS
Status: DISCONTINUED | OUTPATIENT
Start: 2021-07-12 | End: 2021-07-16 | Stop reason: HOSPADM

## 2021-07-12 RX ORDER — CELECOXIB 200 MG/1
200 CAPSULE ORAL DAILY PRN
Status: DISCONTINUED | OUTPATIENT
Start: 2021-07-12 | End: 2021-07-16 | Stop reason: HOSPADM

## 2021-07-12 RX ORDER — AZITHROMYCIN 500 MG/1
500 INJECTION, POWDER, LYOPHILIZED, FOR SOLUTION INTRAVENOUS EVERY 24 HOURS
Status: COMPLETED | OUTPATIENT
Start: 2021-07-12 | End: 2021-07-12

## 2021-07-12 RX ORDER — PIPERACILLIN SODIUM, TAZOBACTAM SODIUM 2; .25 G/10ML; G/10ML
2.25 INJECTION, POWDER, LYOPHILIZED, FOR SOLUTION INTRAVENOUS EVERY 6 HOURS
Status: DISCONTINUED | OUTPATIENT
Start: 2021-07-12 | End: 2021-07-14

## 2021-07-12 RX ORDER — SIMVASTATIN 20 MG
40 TABLET ORAL AT BEDTIME
Status: DISCONTINUED | OUTPATIENT
Start: 2021-07-12 | End: 2021-07-16 | Stop reason: HOSPADM

## 2021-07-12 RX ORDER — METOPROLOL SUCCINATE 25 MG/1
25 TABLET, EXTENDED RELEASE ORAL DAILY
Status: DISCONTINUED | OUTPATIENT
Start: 2021-07-12 | End: 2021-07-16 | Stop reason: HOSPADM

## 2021-07-12 RX ORDER — DEXTROSE MONOHYDRATE 25 G/50ML
25-50 INJECTION, SOLUTION INTRAVENOUS
Status: DISCONTINUED | OUTPATIENT
Start: 2021-07-12 | End: 2021-07-16 | Stop reason: HOSPADM

## 2021-07-12 RX ORDER — ACETAMINOPHEN 650 MG/1
650 SUPPOSITORY RECTAL EVERY 6 HOURS PRN
Status: DISCONTINUED | OUTPATIENT
Start: 2021-07-12 | End: 2021-07-16 | Stop reason: HOSPADM

## 2021-07-12 RX ORDER — IPRATROPIUM BROMIDE AND ALBUTEROL SULFATE 2.5; .5 MG/3ML; MG/3ML
3 SOLUTION RESPIRATORY (INHALATION)
Status: DISCONTINUED | OUTPATIENT
Start: 2021-07-12 | End: 2021-07-12

## 2021-07-12 RX ORDER — IOPAMIDOL 755 MG/ML
59 INJECTION, SOLUTION INTRAVASCULAR ONCE
Status: COMPLETED | OUTPATIENT
Start: 2021-07-12 | End: 2021-07-12

## 2021-07-12 RX ORDER — BISACODYL 10 MG
10 SUPPOSITORY, RECTAL RECTAL DAILY PRN
Status: DISCONTINUED | OUTPATIENT
Start: 2021-07-12 | End: 2021-07-16 | Stop reason: HOSPADM

## 2021-07-12 RX ORDER — DONEPEZIL HYDROCHLORIDE 10 MG/1
10 TABLET, FILM COATED ORAL AT BEDTIME
Status: DISCONTINUED | OUTPATIENT
Start: 2021-07-12 | End: 2021-07-16 | Stop reason: HOSPADM

## 2021-07-12 RX ORDER — CITALOPRAM HYDROBROMIDE 20 MG/1
20 TABLET ORAL DAILY
Status: DISCONTINUED | OUTPATIENT
Start: 2021-07-12 | End: 2021-07-16 | Stop reason: HOSPADM

## 2021-07-12 RX ORDER — ONDANSETRON 4 MG/1
4 TABLET, ORALLY DISINTEGRATING ORAL EVERY 6 HOURS PRN
Status: DISCONTINUED | OUTPATIENT
Start: 2021-07-12 | End: 2021-07-16 | Stop reason: HOSPADM

## 2021-07-12 RX ORDER — ALBUTEROL SULFATE 0.83 MG/ML
3 SOLUTION RESPIRATORY (INHALATION)
Status: DISCONTINUED | OUTPATIENT
Start: 2021-07-12 | End: 2021-07-12

## 2021-07-12 RX ORDER — GUAIFENESIN/DEXTROMETHORPHAN 100-10MG/5
10 SYRUP ORAL 4 TIMES DAILY PRN
Status: DISCONTINUED | OUTPATIENT
Start: 2021-07-12 | End: 2021-07-12

## 2021-07-12 RX ORDER — AMOXICILLIN 250 MG
2 CAPSULE ORAL 2 TIMES DAILY PRN
Status: DISCONTINUED | OUTPATIENT
Start: 2021-07-12 | End: 2021-07-16 | Stop reason: HOSPADM

## 2021-07-12 RX ORDER — LEVOTHYROXINE SODIUM 88 UG/1
88 TABLET ORAL DAILY
Status: DISCONTINUED | OUTPATIENT
Start: 2021-07-12 | End: 2021-07-16 | Stop reason: HOSPADM

## 2021-07-12 RX ORDER — MEMANTINE HYDROCHLORIDE 28 MG/1
28 CAPSULE, EXTENDED RELEASE ORAL DAILY
Status: DISCONTINUED | OUTPATIENT
Start: 2021-07-12 | End: 2021-07-16 | Stop reason: HOSPADM

## 2021-07-12 RX ORDER — LIDOCAINE 40 MG/G
CREAM TOPICAL
Status: DISCONTINUED | OUTPATIENT
Start: 2021-07-12 | End: 2021-07-16 | Stop reason: HOSPADM

## 2021-07-12 RX ORDER — FLUOCINOLONE ACETONIDE 0.25 MG/G
CREAM TOPICAL 2 TIMES DAILY
Status: DISCONTINUED | OUTPATIENT
Start: 2021-07-12 | End: 2021-07-12

## 2021-07-12 RX ORDER — GUAIFENESIN/DEXTROMETHORPHAN 100-10MG/5
10 SYRUP ORAL EVERY 4 HOURS PRN
Status: DISCONTINUED | OUTPATIENT
Start: 2021-07-12 | End: 2021-07-16 | Stop reason: HOSPADM

## 2021-07-12 RX ORDER — GABAPENTIN 300 MG/1
600 CAPSULE ORAL 3 TIMES DAILY
Status: DISCONTINUED | OUTPATIENT
Start: 2021-07-12 | End: 2021-07-16 | Stop reason: HOSPADM

## 2021-07-12 RX ORDER — ALBUTEROL SULFATE 90 UG/1
2 AEROSOL, METERED RESPIRATORY (INHALATION) EVERY 6 HOURS PRN
Status: DISCONTINUED | OUTPATIENT
Start: 2021-07-12 | End: 2021-07-16 | Stop reason: HOSPADM

## 2021-07-12 RX ORDER — HYDROCODONE BITARTRATE AND ACETAMINOPHEN 5; 325 MG/1; MG/1
1 TABLET ORAL
Status: DISCONTINUED | OUTPATIENT
Start: 2021-07-12 | End: 2021-07-16 | Stop reason: HOSPADM

## 2021-07-12 RX ORDER — POLYETHYLENE GLYCOL 3350 17 G/17G
17 POWDER, FOR SOLUTION ORAL DAILY PRN
Status: DISCONTINUED | OUTPATIENT
Start: 2021-07-12 | End: 2021-07-16 | Stop reason: HOSPADM

## 2021-07-12 RX ORDER — OLOPATADINE HYDROCHLORIDE 1 MG/ML
1 SOLUTION/ DROPS OPHTHALMIC PRN
Status: DISCONTINUED | OUTPATIENT
Start: 2021-07-12 | End: 2021-07-16 | Stop reason: HOSPADM

## 2021-07-12 RX ORDER — CYCLOBENZAPRINE HCL 5 MG
10 TABLET ORAL DAILY
Status: DISCONTINUED | OUTPATIENT
Start: 2021-07-12 | End: 2021-07-16 | Stop reason: HOSPADM

## 2021-07-12 RX ORDER — AMOXICILLIN 250 MG
2 CAPSULE ORAL 2 TIMES DAILY PRN
Status: DISCONTINUED | OUTPATIENT
Start: 2021-07-12 | End: 2021-07-12

## 2021-07-12 RX ORDER — AMOXICILLIN 250 MG
1 CAPSULE ORAL 2 TIMES DAILY PRN
Status: DISCONTINUED | OUTPATIENT
Start: 2021-07-12 | End: 2021-07-16 | Stop reason: HOSPADM

## 2021-07-12 RX ORDER — FLUTICASONE PROPIONATE 50 MCG
2 SPRAY, SUSPENSION (ML) NASAL DAILY PRN
Status: DISCONTINUED | OUTPATIENT
Start: 2021-07-12 | End: 2021-07-16 | Stop reason: HOSPADM

## 2021-07-12 RX ORDER — PIPERACILLIN SODIUM, TAZOBACTAM SODIUM 3; .375 G/15ML; G/15ML
3.38 INJECTION, POWDER, LYOPHILIZED, FOR SOLUTION INTRAVENOUS EVERY 6 HOURS
Status: DISCONTINUED | OUTPATIENT
Start: 2021-07-12 | End: 2021-07-12

## 2021-07-12 RX ADMIN — IPRATROPIUM BROMIDE AND ALBUTEROL SULFATE 3 ML: .5; 3 SOLUTION RESPIRATORY (INHALATION) at 08:16

## 2021-07-12 RX ADMIN — MEMANTINE HYDROCHLORIDE 28 MG: 28 CAPSULE, EXTENDED RELEASE ORAL at 11:26

## 2021-07-12 RX ADMIN — INSULIN ASPART 1 UNITS: 100 INJECTION, SOLUTION INTRAVENOUS; SUBCUTANEOUS at 18:13

## 2021-07-12 RX ADMIN — FLUTICASONE FUROATE 1 PUFF: 200 POWDER RESPIRATORY (INHALATION) at 13:26

## 2021-07-12 RX ADMIN — GABAPENTIN 600 MG: 300 CAPSULE ORAL at 22:25

## 2021-07-12 RX ADMIN — GABAPENTIN 600 MG: 300 CAPSULE ORAL at 11:25

## 2021-07-12 RX ADMIN — PIPERACILLIN SODIUM AND TAZOBACTAM SODIUM 2.25 G: 2; .25 INJECTION, POWDER, LYOPHILIZED, FOR SOLUTION INTRAVENOUS at 13:24

## 2021-07-12 RX ADMIN — SIMVASTATIN 40 MG: 20 TABLET, FILM COATED ORAL at 22:25

## 2021-07-12 RX ADMIN — FLUOCINOLONE ACETONIDE: 0.25 OINTMENT TOPICAL at 22:25

## 2021-07-12 RX ADMIN — LEVOTHYROXINE SODIUM 88 MCG: 88 TABLET ORAL at 11:25

## 2021-07-12 RX ADMIN — RIVAROXABAN 10 MG: 10 TABLET, FILM COATED ORAL at 16:29

## 2021-07-12 RX ADMIN — CYCLOBENZAPRINE 10 MG: 5 TABLET, FILM COATED ORAL at 11:24

## 2021-07-12 RX ADMIN — FLUOCINOLONE ACETONIDE: 0.25 OINTMENT TOPICAL at 13:25

## 2021-07-12 RX ADMIN — SODIUM CHLORIDE 86 ML: 9 INJECTION, SOLUTION INTRAVENOUS at 11:58

## 2021-07-12 RX ADMIN — METOPROLOL SUCCINATE 25 MG: 25 TABLET, EXTENDED RELEASE ORAL at 11:26

## 2021-07-12 RX ADMIN — AZITHROMYCIN MONOHYDRATE 500 MG: 500 INJECTION, POWDER, LYOPHILIZED, FOR SOLUTION INTRAVENOUS at 08:09

## 2021-07-12 RX ADMIN — DONEPEZIL HYDROCHLORIDE 10 MG: 10 TABLET ORAL at 22:25

## 2021-07-12 RX ADMIN — PIPERACILLIN SODIUM AND TAZOBACTAM SODIUM 2.25 G: 2; .25 INJECTION, POWDER, LYOPHILIZED, FOR SOLUTION INTRAVENOUS at 18:14

## 2021-07-12 RX ADMIN — GABAPENTIN 600 MG: 300 CAPSULE ORAL at 16:28

## 2021-07-12 RX ADMIN — GUAIFENESIN AND DEXTROMETHORPHAN 10 ML: 100; 10 SYRUP ORAL at 14:07

## 2021-07-12 RX ADMIN — CITALOPRAM HYDROBROMIDE 20 MG: 20 TABLET ORAL at 11:25

## 2021-07-12 RX ADMIN — PIPERACILLIN SODIUM AND TAZOBACTAM SODIUM 3.38 G: 3; .375 INJECTION, POWDER, LYOPHILIZED, FOR SOLUTION INTRAVENOUS at 06:08

## 2021-07-12 RX ADMIN — LISINOPRIL 40 MG: 40 TABLET ORAL at 14:07

## 2021-07-12 RX ADMIN — IOPAMIDOL 59 ML: 755 INJECTION, SOLUTION INTRAVENOUS at 11:56

## 2021-07-12 ASSESSMENT — ACTIVITIES OF DAILY LIVING (ADL)
ADLS_ACUITY_SCORE: 27

## 2021-07-12 NOTE — H&P
[facility]    History and Physical  Hospitalist       Date of Admission:  7/11/2021  Date of Service (when I saw the patient): 07/11/21    ASSESSMENT  Nhi Perry is a markedly pleasant 89 year old woman with past medical history that is most notable for Type 2 Diabetes mellitus, remote history of pulmonary TB, and recently treated bilateral COVID pneumonia, among others; who presents with ongoing cough and is found to have suspected bilateral pneumonia.    PLAN    Suspected bilateral pneumonia: Of note, she tested positive for COVID on 6/22, and multiple other family members have either had or still have it. She herself went on to develop bilateral pneumonia causing hypoxia and was hospitalized at [facility] from 7/1 to 7/5/2021. There CTPA on 7/1 showed old granulomas and upper lobe pleural plaques. It seems she was treated for pulmonary TB in the [place] in the 1950's. In any event she was stabilized and weaned off oxygen and has now completed a course of Remdesivir and steroids. However she presents now for ongoing cough and worsening weakness. She has leukocytosis. CXR shows bilateral opacifications and upper lobe pleural plaques, seen on previous studies and possibly limiting the diagnotsic yield of the x-ray. She could have post-viral bacterial pneumonia, possibly symptoms related to ongoing COVID or post-COVID symptoms. Reactivated TB could be possible.    -- Inpatient. COVID precautions continued for now. ID consulted. Rivaroxaban continued for VTE chemoprophylaxis. Continue empiric Zosyn and Azithromycin; follow up cultures and check Pro-calcitonin. CTPA repeated for further evaluation.    Recent acute metabolic encephalopathy: Noted at [facility] this month. She could have been sundowning in the setting of possible undiagnosed cognitive deficits. We should monitor for that happening again while she is here.    Possible NILES: She has history reportedly of CKD Stage 3. Cr  1.20 and was 0.85 on 7/5/21. Could be hypovolemic. Mild.    -- For now, encourage fluid intake and repeat labs in AM; if worsening could consider careful trial of IV fluid.    Type 2 Diabetes mellitus: Most recent A1c 6.8 in 7/2021 . On Lantus and Metformin as an outpatient.    -- ISS insulin while hospitalized. Hold oral anti-diabetic medications. Resume Lantus when verified.    Bigeminy: her daughter reported low heart rate when checked at home today and on the monitor she has bradycardia but EKG at the same time show bigeminy with rate 72, likely leading to error in monitor readings.     Chronic anemia: HGB 11.1 and was 10.8 on 7/5/2021.    Hypothyroid: Resume Synthroid when verified  Dyslipidemia: Resume statin when verified  Hypertension: Resume Linsopril pending stable renal function      Chief Complaint   Cough    History is obtained from the patient, her daughter, and the ED physician whom I have spoken with    History of Present Illness   Nhi Perry is a markedly pleasant 89 year old [place] woman, who wishes her daughter at the bedside to provide interpretation, who presents with cough. She hs had thi cough for over two weeks now, minimally relieved with medication at home. She tested positive for COVID on 6/22, and was subsequently hospitalized for cough and dyspnea at [facility] on 7/1, and found to have bilateral COVID pneumonia, and completed a course of Remdesivir and was started on steroids as well; she was weaned off oxygen and otherwise stabilized and discharged home on 7/5. Since then it seems her cough hs persisted constantly, non-productive, and she has had ongoing associated low appetite and occasional nausea without vomiting. Today her daughter checked her oxygen levels and they were 84% on room air, and heart rate was 43, and she was complaining of chills that were new, so she came in. She denies ongoing dyspnea now. She aso otherwise denies chest pain, leg swelling, current diarrhea,  "abdominal or back pain, or any other acute complaints.    In the ED, Blood pressure 109/53, pulse 71, temperature 98.6  F (37  C), temperature source Oral, resp. rate 20, SpO2 96 %.    CBC and BMP were notable for WBC 12.1, HGB 11.1, BUN 48, Cr 1.20, Glucose 274, otherwise were within the normal reference range. Troponin was negative and EKG showed NSR with bigeminy.D-Dimer was 0.61. Blood cultures were sent.     CXR showed: \"IMPRESSION: Extensive pleural plaquing bilateral lungs, worse on the left is again noted. Hazy infiltrates in the bilateral lungs are again seen and may be very slightly worsened as compared to the prior study dated 7/1/2021. Additional subtle opacity in the right lateral costophrenic angle could represent worsening infiltrate in that  location. No pneumothorax or significant pleural fluid collection is  identified. No other significant changes. Heart size remains stable.  Thoracic aortic calcifications are again noted.\"    She was given IV fluid and Zosyn in the ED.    PHYSICAL EXAM  Blood pressure 109/53, pulse 71, temperature 98.6  F (37  C), temperature source Oral, resp. rate 20, SpO2 96 %.  Constitutional: Alert and oriented to person, place and time; no apparent distress; appears very frail  HEENT: normocephalic moist mucus membranes  Respiratory: lungs have faint rales at both bases to auscultation bilaterally; without wheezing  Cardiovascular: regular S1 S2   GI: abdomen soft non tender non distended bowel sounds positive  Lymph/Hematologic: no pallor, no cervical lymphadenopathy  Skin: no rash, good turgor  Musculoskeletal: trace bilateral LE edema  Neurologic: extra-ocular muscles intact; moves all four extremities  Psychiatric: appropriate affect, speech tangential at times     DVT Prophylaxis: Rivaroxaban  Code Status: Full Code    Disposition: Expected discharge in 2-4 days    Ricardo Arguello MD    Past Medical History    I have reviewed this patient's medical history and " updated it with pertinent information if needed.   Past Medical History:   Diagnosis Date     Arthritis      ASCVD (arteriosclerotic cardiovascular disease)     carotid surgery     DM II (diabetes mellitus, type II), controlled (H)      HTN (hypertension)      hypothyroidism      Intertrochanteric fracture of left femur (H) 01/01/2012    no surgical repair     Osteoporosis      Pulmonary tuberculosis     Remote history; [place]     Retina disorder, left     hemorrhage     Seasonal allergies      Thyroid disease        Past Surgical History   I have reviewed this patient's surgical history and updated it with pertinent information if needed.  Past Surgical History:   Procedure Laterality Date     CAROTID ENDARTERECTOMY      with vein graft     CHOLECYSTECTOMY       EYE SURGERY      right cateract     GENITOURINARY SURGERY      lithotripsy for kidney stone     GI SURGERY      colonoscopy/ polypectomy     OPEN REDUCTION INTERNAL FIXATION HIP NAILING  7/28/2014    Procedure: OPEN REDUCTION INTERNAL FIXATION HIP NAILING;  Surgeon: Gee Garcia MD;  Location:  OR       Prior to Admission Medications   Prior to Admission Medications   Prescriptions Last Dose Informant Patient Reported? Taking?   Cetirizine HCl (ZYRTEC ALLERGY PO) prn at prn  Yes Yes   Sig: Take 10 mg by mouth daily as needed.   HYDROcodone-acetaminophen (NORCO) 5-325 MG tablet prn at prn  Yes Yes   Sig: Take 1 tablet by mouth nightly as needed for severe pain   Olopatadine HCl (PATANOL OP) prn at prn  Yes Yes   Sig: Place 1 drop into both eyes as needed   acetaminophen (TYLENOL) 325 MG tablet 7/11/2021 at am  Yes Yes   Sig: Take 650 mg by mouth every 8 hours as needed for pain    albuterol (PROAIR HFA/PROVENTIL HFA/VENTOLIN HFA) 108 (90 Base) MCG/ACT inhaler 7/11/2021 at am  No Yes   Sig: Inhale 2 puffs into the lungs every 6 hours as needed for shortness of breath / dyspnea or wheezing   bisacodyl (DULCOLAX) 10 MG suppository  prn at prn  No Yes   Sig: Place 1 suppository rectally daily as needed.   celecoxib (CELEBREX) 200 MG capsule 7/11/2021 at am  Yes Yes   Sig: Take 200 mg by mouth daily as needed for moderate pain   citalopram (CELEXA) 20 MG tablet 7/11/2021 at Unknown time  Yes Yes   Sig: Take 20 mg by mouth daily   cyclobenzaprine (FLEXERIL) 10 MG tablet 7/11/2021 at am  Yes Yes   Sig: Take 10 mg by mouth daily   dexamethasone (DECADRON) 6 MG tablet 7/11/2021 at am  No Yes   Sig: Take 1 tablet (6 mg) by mouth daily for 5 days   donepezil (ARICEPT) 10 MG tablet 7/10/2021 at pm  Yes Yes   Sig: Take 10 mg by mouth At Bedtime   fluocinolone (SYNALAR) 0.025 % cream 7/11/2021 at Unknown time  Yes Yes   Sig: Apply topically 2 times daily   fluticasone (FLONASE) 50 MCG/ACT nasal spray prn at prn  Yes Yes   Sig: Spray 2 sprays into both nostrils daily as needed for rhinitis or allergies   fluticasone (FLOVENT HFA) 220 MCG/ACT inhaler 7/11/2021 at Unknown time  Yes Yes   Sig: Inhale 2 puffs into the lungs 2 times daily   gabapentin (NEURONTIN) 300 MG capsule 7/11/2021 at Unknown time  Yes Yes   Sig: Take 600 mg by mouth 3 times daily   guaiFENesin-dextromethorphan (ROBITUSSIN DM) 100-10 MG/5ML syrup 7/11/2021 at Unknown time  No Yes   Sig: Take 10 mLs by mouth 4 times daily as needed for cough   levothyroxine (SYNTHROID/LEVOTHROID) 88 MCG tablet 7/11/2021 at Unknown time  Yes Yes   Sig: Take 88 mcg by mouth daily   lisinopril (ZESTRIL) 40 MG tablet 7/11/2021 at Unknown time  Yes Yes   Sig: Take 40 mg by mouth daily   memantine XR (NAMENDA XR) 28 MG 24 hr capsule Past Week at Unknown time  Yes Yes   Sig: Take 28 mg by mouth daily   metFORMIN (GLUCOPHAGE) 500 MG tablet 7/11/2021 at Unknown time  No Yes   Sig: Take 1 tablet by mouth 2 times daily (with meals).   metoprolol succinate ER (TOPROL-XL) 25 MG 24 hr tablet 7/11/2021 at Unknown time  Yes Yes   Sig: Take 25 mg by mouth daily   mometasone furoate (ASMANEX HFA) 200 MCG/ACT inhaler Past  Week at Unknown time  Yes Yes   Sig: Inhale 2 puffs into the lungs 2 times daily   rivaroxaban ANTICOAGULANT (XARELTO) 10 MG TABS tablet 7/11/2021 at Unknown time  No Yes   Sig: Take 1 tablet (10 mg) by mouth daily (with dinner)   senna-docusate (SENOKOT-S/PERICOLACE) 8.6-50 MG tablet prn at prn  Yes Yes   Sig: Take 2 tablets by mouth 2 times daily as needed for constipation   simvastatin (ZOCOR) 40 MG tablet 7/10/2021 at pm  Yes Yes   Sig: Take 40 mg by mouth daily    zolpidem (AMBIEN) 5 MG tablet 7/10/2021 at Unknown time  Yes Yes   Sig: Take 5 mg by mouth nightly as needed for sleep      Facility-Administered Medications: None     Allergies   Allergies   Allergen Reactions     Seasonal Allergies        Social History   I have reviewed this patient's social history and updated it with pertinent information if needed. Nhi Perry  reports that she has never smoked. She has never used smokeless tobacco. She reports that she does not drink alcohol and does not use drugs.    Family History   Family history assessed and, except as above, is non-contributory.    Family History   Problem Relation Age of Onset     Cancer Father      Alzheimer Disease Mother      Hypertension Mother      C.A.D. No family hx of        Review of Systems   The 10 point Review of Systems is negative other than noted in the HPI or here.     Primary Care Physician   Physician No Ref-Primary    Data   Labs Ordered and Resulted from Time of ED Arrival Up to the Time of Departure from the ED   D DIMER QUANTITATIVE - Abnormal; Notable for the following components:       Result Value    D-Dimer Quantitative 0.61 (*)     All other components within normal limits    Narrative:     This D-dimer assay is intended for use in conjunction with a clinical pretest probability assessment model to exclude pulmonary embolism (PE) and deep venous thrombosis (DVT) in outpatients suspected of PE or DVT. The cut-off value is 0.50 ug/mL FEU.   BASIC METABOLIC  PANEL - Abnormal; Notable for the following components:    Urea Nitrogen 48 (*)     Creatinine 1.20 (*)     Glucose 274 (*)     GFR Estimate 39 (*)     All other components within normal limits   CBC WITH PLATELETS AND DIFFERENTIAL - Abnormal; Notable for the following components:    WBC Count 12.1 (*)     RBC Count 3.67 (*)     Hemoglobin 11.1 (*)     Hematocrit 34.1 (*)     Absolute Neutrophils 8.9 (*)     Absolute Immature Granulocytes 0.5 (*)     All other components within normal limits   TROPONIN I - Normal   ROUTINE UA WITH MICROSCOPIC REFLEX TO CULTURE   PROCALCITONIN   PERIPHERAL IV CATHETER   BLOOD CULTURE   BLOOD CULTURE   CBC WITH PLATELETS & DIFFERENTIAL    Narrative:     The following orders were created for panel order CBC with platelets differential.  Procedure                               Abnormality         Status                     ---------                               -----------         ------                     CBC with platelets and d...[[id number]]  Abnormal            Final result                 Please view results for these tests on the individual orders.       Data reviewed today:  I personally reviewed the chest x-ray image(s) showing bilateral opacifications.    Recent Results (from the past 24 hour(s))   XR Chest Port 1 View    Narrative    CHEST PORTABLE ONE VIEW   7/11/2021 8:17 PM     HISTORY: Cough, COVID.    COMPARISON: Chest x-rays and CT dated 7/1/2021.      Impression    IMPRESSION: Extensive pleural plaquing bilateral lungs, worse on the  left is again noted. Hazy infiltrates in the bilateral lungs are again  seen and may be very slightly worsened as compared to the prior study  dated 7/1/2021. Additional subtle opacity in the right lateral  costophrenic angle could represent worsening infiltrate in that  location. No pneumothorax or significant pleural fluid collection is  identified. No other significant changes. Heart size remains stable.  Thoracic aortic calcifications  are again noted.

## 2021-07-12 NOTE — ED NOTES
[facility]  ED Nurse Handoff Report    ED Chief complaint: Generalized Weakness      ED Diagnosis:   Final diagnoses:   None       Code Status: Full Code    Allergies:   Allergies   Allergen Reactions     Seasonal Allergies        Patient Story: positive covid 6/22/21 Chief complaint weakness today  Focused Assessment: Requires Maldivian . Pt arrived via EMS after daughter called 911 for increased weakness. Pt was COVID positive 6/22/21 with ongoing cough. Reports weakness for 2 days. Nonambulatory at baseline. Denies urinary symptoms or increased cough. Daughter reports abnormal VS at home with HR 40s-50s, hypotensive, O2 87% on room air, increased to 97% with O2 4L via NC per EMS. O2 91% on room air on arrival. Daughter is caretaker at home Shana # [phone].     Treatments and/or interventions provided: lab, 18G PIV RAC, fluids, CXR  Patient's response to treatments and/or interventions: tolerated    To be done/followed up on inpatient unit:  see orders    Does this patient have any cognitive concerns?: n/a A&Ox4    Activity level - Baseline/Home:  Stand with assist x2  Activity Level - Current:   Stand with assist x2    Patient's Preferred language: Russian   Needed?: Yes    Isolation: None and COVID r/o and special precautions  Infection: Not Applicable  COVID r/o and special precautions  Patient tested for COVID 19 prior to admission: YES  Bariatric?: No    Vital Signs:   Vitals:    07/11/21 1916   BP: 111/73   Pulse: 83   Resp: 20   Temp: 98.6  F (37  C)   TempSrc: Oral   SpO2: 91%       Cardiac Rhythm:     Was the PSS-3 completed:   Yes  What interventions are required if any?               Family Comments: cora Saldana at bedside  OBS brochure/video discussed/provided to patient/family: Yes              Name of person given brochure if not patient:               Relationship to patient:     For the majority of the shift this patient's behavior was Green.    Behavioral interventions performed were .    ED NURSE PHONE NUMBER: [phone]

## 2021-07-12 NOTE — ED TRIAGE NOTES
Pt arrives via EMS after daughter called 911 for increased weakness. Pt was COVID positive 6/22/21 with ongoing cough. Reports weakness for 2 days. Nonambulatory at baseline. Denies urinary symptoms or increased cough. Daughter reports abnormal VS at home with HR 40s-50s, hypotensive, O2 87% on room air, increased to 97% with O2 4L via NC per EMS. O2 91% on room air on arrival. Daughter Shana # [phone]

## 2021-07-12 NOTE — PHARMACY-ADMISSION MEDICATION HISTORY
Pharmacy Medication History  Admission medication history interview status for the 7/11/2021  admission is complete. See EPIC admission navigator for prior to admission medications     Location of Interview: Patient room  Medication history sources: Patient's family/friend (daughter Shana) and Surescripts    Significant changes made to the medication list:  Added asmanex  Added last doses    In the past week, patient estimated taking medication this percent of the time: greater than 90%    Additional medication history information:   Went over the meds with daughter that sets up her meds.daughter emphasized that she applies fluocinolone consistently twice daily.    Medication reconciliation completed by provider prior to medication history? No    Time spent in this activity: 30min    Prior to Admission medications    Medication Sig Last Dose Taking? Auth Provider   acetaminophen (TYLENOL) 325 MG tablet Take 650 mg by mouth every 8 hours as needed for pain  7/11/2021 at am Yes Unknown, Entered By History   albuterol (PROAIR HFA/PROVENTIL HFA/VENTOLIN HFA) 108 (90 Base) MCG/ACT inhaler Inhale 2 puffs into the lungs every 6 hours as needed for shortness of breath / dyspnea or wheezing 7/11/2021 at am Yes Rufino See MD   bisacodyl (DULCOLAX) 10 MG suppository Place 1 suppository rectally daily as needed. prn at prn Yes Kate Resendez MD   celecoxib (CELEBREX) 200 MG capsule Take 200 mg by mouth daily as needed for moderate pain 7/11/2021 at am Yes Unknown, Entered By History   Cetirizine HCl (ZYRTEC ALLERGY PO) Take 10 mg by mouth daily as needed. prn at prn Yes Unknown, Entered By History   citalopram (CELEXA) 20 MG tablet Take 20 mg by mouth daily 7/11/2021 at Unknown time Yes Unknown, Entered By History   cyclobenzaprine (FLEXERIL) 10 MG tablet Take 10 mg by mouth daily 7/11/2021 at am Yes Unknown, Entered By History   dexamethasone (DECADRON) 6 MG tablet Take 1 tablet (6 mg) by mouth daily for 5  days 7/11/2021 at am Yes Alexander Espino MD   donepezil (ARICEPT) 10 MG tablet Take 10 mg by mouth At Bedtime 7/10/2021 at pm Yes Unknown, Entered By History   fluocinolone (SYNALAR) 0.025 % cream Apply topically 2 times daily 7/11/2021 at Unknown time Yes Unknown, Entered By History   fluticasone (FLONASE) 50 MCG/ACT nasal spray Spray 2 sprays into both nostrils daily as needed for rhinitis or allergies prn at prn Yes Unknown, Entered By History   fluticasone (FLOVENT HFA) 220 MCG/ACT inhaler Inhale 2 puffs into the lungs 2 times daily 7/11/2021 at Unknown time Yes Unknown, Entered By History   gabapentin (NEURONTIN) 300 MG capsule Take 600 mg by mouth 3 times daily 7/11/2021 at Unknown time Yes Unknown, Entered By History   guaiFENesin-dextromethorphan (ROBITUSSIN DM) 100-10 MG/5ML syrup Take 10 mLs by mouth 4 times daily as needed for cough 7/11/2021 at Unknown time Yes Alexander Espino MD   HYDROcodone-acetaminophen (NORCO) 5-325 MG tablet Take 1 tablet by mouth nightly as needed for severe pain prn at prn Yes Unknown, Entered By History   levothyroxine (SYNTHROID/LEVOTHROID) 88 MCG tablet Take 88 mcg by mouth daily 7/11/2021 at Unknown time Yes Unknown, Entered By History   lisinopril (ZESTRIL) 40 MG tablet Take 40 mg by mouth daily 7/11/2021 at Unknown time Yes Unknown, Entered By History   memantine XR (NAMENDA XR) 28 MG 24 hr capsule Take 28 mg by mouth daily Past Week at Unknown time Yes Unknown, Entered By History   metFORMIN (GLUCOPHAGE) 500 MG tablet Take 1 tablet by mouth 2 times daily (with meals). 7/11/2021 at Unknown time Yes Kate Resendez MD   metoprolol succinate ER (TOPROL-XL) 25 MG 24 hr tablet Take 25 mg by mouth daily 7/11/2021 at Unknown time Yes Unknown, Entered By History   mometasone furoate (ASMANEX HFA) 200 MCG/ACT inhaler Inhale 2 puffs into the lungs 2 times daily Past Week at Unknown time Yes Unknown, Entered By History   Olopatadine HCl (PATANOL OP) Place 1 drop into both  eyes as needed prn at prn Yes Unknown, Entered By History   rivaroxaban ANTICOAGULANT (XARELTO) 10 MG TABS tablet Take 1 tablet (10 mg) by mouth daily (with dinner) 7/11/2021 at Unknown time Yes Alexander Espino MD   senna-docusate (SENOKOT-S/PERICOLACE) 8.6-50 MG tablet Take 2 tablets by mouth 2 times daily as needed for constipation prn at prn Yes Unknown, Entered By History   simvastatin (ZOCOR) 40 MG tablet Take 40 mg by mouth daily  7/10/2021 at pm Yes Unknown, Entered By History   zolpidem (AMBIEN) 5 MG tablet Take 5 mg by mouth nightly as needed for sleep 7/10/2021 at Unknown time Yes Unknown, Entered By History       The information provided in this note is only as accurate as the sources available at the time of update(s)

## 2021-07-12 NOTE — PROGRESS NOTES
[facility]    Medicine Progress Note - Hospitalist Service       Date of Admission:  7/11/2021    Assessment & Plan         Nhi Perry is a markedly pleasant 89 year old woman with past medical history that is most notable for Type 2 Diabetes mellitus, remote history of pulmonary TB, and recently treated bilateral COVID pneumonia, among others; who presents with ongoing cough and is found to have suspected bilateral pneumonia.        Suspected bilateral pneumonia  Of note, she tested positive for COVID on 6/22, and multiple other family members have either had or still have it. She herself went on to develop bilateral pneumonia causing hypoxia and was hospitalized at [facility] from 7/1 to 7/5/2021. There CTPA on 7/1 showed old granulomas and upper lobe pleural plaques. It seems she was treated for pulmonary TB in the [place] in the 1950's. In any event she was stabilized and weaned off oxygen and has now completed a course of Remdesivir and steroids. However she presents now for ongoing cough and worsening weakness. She has leukocytosis. CXR shows bilateral opacifications and upper lobe pleural plaques, seen on previous studies and possibly limiting the diagnotsic yield of the x-ray. She could have post-viral bacterial pneumonia, possibly symptoms related to ongoing COVID or post-COVID symptoms. Reactivated TB could be possible.  - COVID precautions continued for now.  - Rivaroxaban continued for VTE chemoprophylaxis.   - Continue empiric Zosyn and Azithromycin - defer to ID  - follow up cultures and Pro-calcitonin negative <0.05.   - CTPA repeated for further evaluation - no PE, worsened bilateral infiltrates noted  - appears nontoxic today, afebrile. ID following and appreciated.   - wean O2 as able     Recent acute metabolic encephalopathy  Noted at [facility] this month. She could have been sundowning in the setting of possible undiagnosed cognitive deficits. We should  monitor for that happening again while she is here.  - appears stable and oriented     Possible NILES on CKD III  She has history reportedly of CKD Stage 3. Cr 1.20 and was 0.85 on 7/5/21. Could be hypovolemic. Mild.  - For now, encourage fluid intake and repeat labs in AM; if worsening could consider careful trial of IV fluid. BMP improved 7/12     Type 2 Diabetes mellitus  Most recent A1c 6.8 in 7/2021. On Metformin as an outpatient.  - ISS insulin while hospitalized.   - Hold metformin     Bigeminy  Her daughter reported low heart rate when checked at home today and on the monitor she has bradycardia but EKG at the same time show bigeminy with rate 72, likely leading to error in monitor readings.      Chronic anemia: HGB 11.1 and was 10.8 on 7/5/2021.     Hypothyroid: Resume Synthroid when verified  Dyslipidemia: Resume statin when verified  Hypertension: Resume Linsopril pending stable renal function      Diet: Combination Diet Regular Diet Adult; Consistent Carb 60 Grams CHO per Meal Diet    DVT Prophylaxis: xarelto  Hawley Catheter: Not present  Central Lines: None  Code Status: Full Code      Disposition Plan   Expected discharge: 2+ days     The patient's care was discussed with the Bedside Nurse and Patient.    Selvin Singer MD  Hospitalist Service  [facility]  Securely message with the Vocera Web Console (learn more here)  Text page via [employer] Paging/Directory      Risk Factors Present on Admission             # Coagulation Defect: home medication list includes an anticoagulant medication       ______________________________________________________________________    Interval History   Care assumed today. Pt seen and examined with ipad interpretor.   No new complaints, ongoing cough. No chest pain, new sob, or fevers/chills.  ID following. CT repeated with no PE but worsened infiltrates.    Data reviewed today: I reviewed all medications, new labs and imaging results over the last  24 hours. I personally reviewed no images or EKG's today.    Physical Exam   Vital Signs: Temp: 98.2  F (36.8  C) Temp src: Axillary BP: 124/62 Pulse: 74   Resp: 20 SpO2: 99 % O2 Device: Nasal cannula Oxygen Delivery: 2 LPM  Weight: 150 lbs 5.66 oz    Gen: NAD, pleasant  HEENT: Normocephalic, EOMI, MMM  Resp: no focal crackles,  no wheezes, no increased work of resp  CV: S1S2 heard, reg rhythm, reg rate, no pedal edema  Abdo: soft, nontender, nondistended, bowel sounds present  Ext: calves nontender, well perfused  Neuro: AAOx3, CN grossly intact, no facial asymmetry      Data   Recent Labs   Lab 07/12/21  1218 07/12/21  1012 07/12/21  0800 07/11/21 1958   WBC  --  7.6  --  12.1*   HGB  --  10.8*  --  11.1*   MCV  --  94  --  93   PLT  --  197  --  258   NA  --  136  --  135   POTASSIUM  --  4.3  --  4.4   CHLORIDE  --  109  --  101   CO2  --  19*  --  23   BUN  --  42*  --  48*   CR  --  1.06*  --  1.20*   ANIONGAP  --  8  --  11   CHRIS  --  8.0*  --  8.8   * 110* 121* 274*   ALBUMIN  --  2.0*  --   --    PROTTOTAL  --  5.9*  --   --    BILITOTAL  --  0.5  --   --    ALKPHOS  --  69  --   --    ALT  --  47  --   --    AST  --  34  --   --    TROPONIN  --   --   --  <0.015     Recent Results (from the past 24 hour(s))   XR Chest Port 1 View    Narrative    CHEST PORTABLE ONE VIEW   7/11/2021 8:17 PM     HISTORY: Cough, COVID.    COMPARISON: Chest x-rays and CT dated 7/1/2021.      Impression    IMPRESSION: Extensive pleural plaquing bilateral lungs, worse on the  left is again noted. Hazy infiltrates in the bilateral lungs are again  seen and may be very slightly worsened as compared to the prior study  dated 7/1/2021. Additional subtle opacity in the right lateral  costophrenic angle could represent worsening infiltrate in that  location. No pneumothorax or significant pleural fluid collection is  identified. No other significant changes. Heart size remains stable.  Thoracic aortic calcifications are again  noted.    SYDNIE JIMÉNEZ MD         SYSTEM ID:  [id number]   CT Chest Pulmonary Embolism w Contrast    Narrative    CT CHEST PULMONARY EMBOLISM WITH CONTRAST July 12, 2021 12:16 PM    CLINICAL HISTORY: Chest Pain. Pulmonary embolus suspected, high  probability.    TECHNIQUE: CT angiogram chest during arterial phase injection IV  contrast. 2D and 3D MIP reconstructions were performed by the CT  technologist. Dose reduction techniques were used.   CONTRAST: 59mL Isovue-370.    COMPARISON: July 1, 2021.    FINDINGS:  ANGIOGRAM CHEST: Pulmonary arteries are normal caliber and negative  for pulmonary emboli. Thoracic aorta is negative for dissection. No CT  evidence of right heart strain.    LUNGS AND PLEURA: Moderately extensive interstitial and airspace  infiltrates bilaterally. Extensive calcified pleural plaques which are  nonspecific but likely related to asbestos exposure.    MEDIASTINUM/AXILLAE: No adenopathy or aneurysm.    UPPER ABDOMEN: No acute findings.    MUSCULOSKELETAL: No frankly destructive bony lesions.      Impression    IMPRESSION:  1.  No pulmonary embolism demonstrated.  2.  Moderately extensive interstitial and airspace infiltrates  bilaterally compatible with pneumonia. Infiltrates are markedly  increased from previous.  3.  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    EVITA GARCIA MD         SYSTEM ID:  [id number]

## 2021-07-12 NOTE — PROGRESS NOTES
RECEIVING UNIT ED HANDOFF REVIEW    ED Nurse Handoff Report was reviewed by: Scarlett Siegel RN on July 12, 2021 at 4:10 AM

## 2021-07-12 NOTE — PLAN OF CARE
Summary: COVID pneumonia, TB r/o   DATE & TIME: 7/12/21 7 a to 3p  Cognitive Concerns/ Orientation : AOx2; disoriented to time and situation , hard to tell due to language barrier, forgetful  BEHAVIOR & AGGRESSION TOOL COLOR: Green  CIWA SCORE: NA   ABNL VS/O2: VSS on 2L NC  MOBILITY: A2 as per ED, not OOB this shift  PAIN MANAGMENT:Throat pain, gave PRN cough medicine  DIET: Mod CHO, 121,199  BOWEL/BLADDER: Purewick in place, no BM this shift  ABNL LAB/BG:  DRAIN/DEVICES: PIV SL with int ABX  TELEMETRY RHYTHM: NA  SKIN: Rash/redness to abdominal and B breast folds. Excoriation in the coccyx area, pre existing as per the night nurse  TESTS/PROCEDURES: UA came back positive, CT chest done  D/C DAY/GOALS/PLACE: Pending improvement and workup, ID following  OTHER IMPORTANT INFO: Czech speaking, jabber in room

## 2021-07-12 NOTE — CONSULTS
Consult Date: 07/12/2021    INFECTIOUS DISEASE CONSULTATION    REFERRING PHYSICIAN:  Dr. Ricardo Arguello.    IMPRESSION:    1.  A 89-year-old female, recent COVID-19 that was symptomatic, now with recurrent worsening respiratory symptoms, second flare up since the diagnosis, likely COVID related damage but concern for secondary bacterial pneumonia.  2.  Recent COVID, likely COVID recovered at this point, but given the active respiratory symptoms, hard to exclude still contagious.  3.  [place] immigrant, prior history of tuberculosis and imaging compatible with old tuberculosis.  4.  Current CT scan with multiple changes, possible old asbestos, changes of old tuberculosis, some bilateral infiltrates that are worse, does not look like tuberculosis.  5.  Diabetes mellitus.    RECOMMENDATIONS:    1.  Continue COVID precautions for now given the active respiratory symptoms, but probably is COVID recovered.  Certainly no specific treatment for COVID at this point.  2.  Conventional antibiotics as we are doing, is down to 1 liter of oxygen.  Does not look toxic or ill.  Possibility of conventional pneumonia is prominent.  3.  From a TB standpoint, no particular testing at this point, not really producing sputum.  I do not think she needs a bronchoscopy.  If she clinically improves with conventional antibiotics, would not pursue this further.    HISTORY OF PRESENT ILLNESS:  This 89-year-old female is seen in consultation.  She is a [place]ian-speaking female and has some mentation changes with the current illness, so difficult to obtain history, did it with a [place]ian .  It appears she had tuberculosis some time in either the late 50s or 60s in [place], got treatment at that time.  The exact details of this are completely unclear of course.  She has never had a recurrent problem since that time and has been in the [place] for some number of years.  She had COVID-19 documented in late June, had treatment at that time,  actually was readmitted and got antibiotics.  She has now been readmitted with worsening respiratory symptoms, without particular sepsis or major fever, no productive sputum, no particular increased cough.  Her hypoxia is relatively mild.  Imaging currently shows increased infiltrate.  She is on conventional antibiotics and feels better.    PAST MEDICAL HISTORY:  [place] immigrant.  The prior tuberculosis details are unclear.  Presumptively fully treated, but of course we do not know and have no documentation of this.  No major recurrent respiratory problems until the current COVID.  History of diabetes.  Exact control level unclear, although recent hemoglobin A1c was relatively okay at 6.8.    SOCIAL AND FAMILY HISTORY:  [place] immigrant as above.  No recent travel or exposures, but extensive healthcare exposure recently.    REVIEW OF SYSTEMS:  Denies any particular symptoms.  Says she feels well.  Some degree of cough.    PHYSICAL EXAMINATION:    GENERAL:  The patient appears her stated age, does not look that toxic or ill.  VITAL SIGNS:  Include tachycardic at 100, respiratory rate is 16 on 1 liter oxygen, oxygenating okay.  HEENT:  No visible lesions.  No intraoral lesions.  NECK:  Supple and nontender, without meningismus.  HEART:  Regular rhythm, no particular murmur.  Slightly tachycardic.  LUNGS:  Crackles at both bases and in particular in the right lung.  ABDOMEN:  Nontender.  EXTREMITIES:  Some edema, no particular lesions.    LABORATORY DATA:  CT scan repeated currently shows increased infiltrates bilaterally, some lymphadenopathy.  It looks chronic, and calcified and some upper lung changes, raising question of asbestos, although conceivably old granulomatous disease, i.e. old TB would look this way.  Imaging not particularly suggestive of active TB.    Thank you very much for the consultation.  I will follow the patient with you.    Robi Chin MD        D: 07/12/2021   T: 07/12/2021   MT:  [id number]    Name:     MORAIMA CUEVAS  MRN:      [id number]        Account:      [id number]   :      1925           Consult Date: 2021     Document: [id number]    cc:  Ricardo Arguello MD

## 2021-07-12 NOTE — ED NOTES
Bed: ED26  Expected date:   Expected time:   Means of arrival:   Comments:  Isacc 3  89 F weakness/95% on @ l nc  [id number]

## 2021-07-12 NOTE — PROGRESS NOTES
Admission    Patient arrives to room 628 via cart from ED.    Inpatient nursing criteria listed below were met:    PCD's Documented: No  Skin issues/needs documented :Yes  Isolation education started/completed Yes  Patient allergies verified with patient: Yes  Verified completion of [place] Risk Assessment Tool:  Yes  Verified completion of Guardianship screening tool: No  Fall Prevention: Care plan updated, Education given and documented Yes  Care Plan initiated: Yes  Home medications documented in belongings flowsheet: NA  Patient belongings documented in belongings flowsheet: Yes  Reminder note (belongings/ medications) placed in discharge instructions:Yes  Admission profile/ required documentation complete: No  Bedside Report Letter given and explained to patient NA  Visitor Designated? No  If patient is a 72 hour hold/Commitment are belongings removed from room and locked up? NA

## 2021-07-12 NOTE — ED PROVIDER NOTES
History   Chief Complaint:  Covid     HPI   Nhi Perry is a 89 year old female with history of positive covid test 06/22/2021 and hospitalization 07/01/2021 for pneumonia due to covid who presents with covid. Patient presents via EMS who states she has had increased weakness and heart rates in 40s-50s. Of note the patient is nonambulatory at baseline.  Patient arrived on oxygen as her O2 was 87%.  Of note 07/01/2021 the patient was started on Decadron, Robitussin and Xarelto. She denies nausea, vomiting, diarrhea and chest pain. She states she does not feel better or worse. The patients daughter states she has been coughing, and had lowered heart rate today, increased weakness and low O2.     Review of Systems   Respiratory: Positive for cough.    Cardiovascular: Negative for chest pain.   Gastrointestinal: Negative for diarrhea, nausea and vomiting.   All other systems reviewed and are negative.      Allergies:  Amoxicillin     Medications:  Cetirizine  Citalopram  Ergocalciferol  Gabapentin  Hydrocodone-acetaminophen  Levothyroxine  Lisinopril  Metformin  Metoprolol  Oxycodone  Senna-docusate  Simvastatin  Meclizine  Memantine  Celocoxib  Bisacodyl  Quetiapine  Donepezil  Cyclobenzaprine  Zolpidem     Past Medical History:    Arthritis  ASCVD  Diabetes mellitus  Hypertension  Hypothyroidism  Intertrochanteric fracture of the left femur  Osteoporosis  Retina disorder  Thyroid disease  Intertochanteric fracture of right femur  Hip fracture, intertochanteric  Squamous cell carcinoma of skin  Insomnia  Macular degeneration of retina  Spinal stenosis of lumbar region  Depression  Degenerative joint disease  Nephrolithiasis     Past Surgical History:    Carotid endarterectomy  Cholecystectomy  Cataract removal  Genitourinary surgery  Open reduction internal fixation hip nailing     Family History:    Father: Cancer  Mother: Alzheimer disease, hypertension     Social History:  Family at home are also sick with  similar symptoms     Physical Exam     Patient Vitals for the past 24 hrs:   BP Temp Temp src Pulse Resp SpO2   07/11/21 2045 109/53 -- -- 71 -- 96 %   07/11/21 1916 111/73 98.6  F (37  C) Oral 83 20 91 %       Physical Exam  GENERAL: well developed, pleasant  HEAD: atraumatic  EYES: pupils reactive, extraocular muscles intact, conjunctivae normal  ENT:  mucus membranes moist  NECK:  trachea midline, normal range of motion  RESPIRATORY: no tachypnea, breath sounds clear to auscultation   CVS: normal S1/S2, no murmurs, intact distal pulses  ABDOMEN: soft, nontender, nondistention  MUSCULOSKELETAL: no deformities  SKIN: warm and dry, no acute rashes or ulceration  NEURO: GCS 15, cranial nerves intact, alert and oriented x3  PSYCH:  Mood/affect normal        Emergency Department Course   ECG (22:36:25):  Indication: Screening for cardiovascular disease.   Rate 75 bpm. NJ interval 174. QRS duration 72. QT/QTc 464/518. P-R-T axes 71 46 91.   Interpretation: Sinus rhythm with frequent premature ventricular complexes in a pattern of bigminy. Prolonged QT. Abnormal ECG  Interpreted at 2236 by Dr. Greco.     Imaging:    XR Chest port, 1 view:  Extensive pleural plaquing bilateral lungs, worse on the   left is again noted. Hazy infiltrates in the bilateral lungs are again   seen and may be very slightly worsened as compared to the prior study   dated 7/1/2021. Additional subtle opacity in the right lateral   costophrenic angle could represent worsening infiltrate in that   location. No pneumothorax or significant pleural fluid collection is   identified. No other significant changes. Heart size remains stable.   Thoracic aortic calcifications are again noted.      Laboratory:    CBC: WBC: 12.1 (H), HGB: 11.1 (L), PLT: 258  BMP: Glucose 274 (H), Urea Nitrogen: 48 (H), GFR: 39 (L), Creatinine 1.20 (H)o/w WNL   Troponin (Collected 1958): <0.015  Blood cultures: Pending    Emergency Department Course:    Reviewed:  I reviewed the  patient's nursing notes, vitals, past medical records, Care Everywhere.     Assessments/Consults:  ED Course as of Jul 11 2158   Sun Jul 11, 2021 1925 I performed exam and assessment      1936 I consulted the patients daughter for further details      2154 I consulted Dr. Arguello      2158 I spoke with daughter and patient regarding plan for admission            Interventions:  2046 NS 1L IV Bolus          Disposition:  The patient was admitted to the hospital under the care of Dr. Arguello.     Impression & Plan     CMS Diagnoses: None    Medical Decision Making:  Patient presents with ongoing coughing weakness and hypoxia.  She was diagnosed with Covid on June 22 and was hospitalized.  She has a pulse ox at home and that sats have been anywhere between 84 and 89%.  Daughter noted that her pulse rate was low and she was weaker as well.  Chest x-ray is confirmed concerning for possible worsening of infiltrates.  Blood cultures are obtained.  She was given antibiotics for possible pneumonia.  We did get an EKG and the tech noted that on the monitor in the room it was reading in the mid 30s but as soon as he had pressed the EKG he would get sinus rhythm with bigeminy at a rate of 75 and did that few times and was unable to capture 30s on the EKG although he did not see any changes on the monitor when he was doing this.  Suspect possibly the monitor is not picking up the bigeminy as half of 75 would be 38 as that is what they are seeing.    Critical Care Time: was 0 minutes for this patient excluding procedures    Covid-19  Nhi Perry was evaluated during a global COVID-19 pandemic, which necessitated consideration that the patient might be at risk for infection with the SARS-CoV-2 virus that causes COVID-19.   Applicable protocols for evaluation were followed during the patient's care.   COVID-19 was considered as part of the patient's evaluation. The plan for testing is:  a test was obtained at a previous  visit and reviewed & considered today    Diagnosis:    ICD-10-CM    1. Pneumonia due to 2019 novel coronavirus  U07.1     J12.82        Scribe Disclosure:  I, Remi Calhoun, am serving as a scribe at 7:12 PM on 7/11/2021 to document services personally performed by Ankush Greco MD based on my observations and the provider's statements to me.        Ankush Greco MD  07/11/21 4951

## 2021-07-12 NOTE — CONSULTS
ID consult dictated IMP 1 89 yo female recent Covid19, now worsening, hard to interpret CXR, no fever mild hypoxia    REC ABX, CT, follow sxs, iso still doubt reactivated TB

## 2021-07-12 NOTE — PLAN OF CARE
Summary: COVID pneumonia, TB r/o   DATE & TIME: 7/11/21-7/12/21 5043-9329  Cognitive Concerns/ Orientation : AOx2; disoriented to time and situation   BEHAVIOR & AGGRESSION TOOL COLOR: Green  CIWA SCORE: NA   ABNL VS/O2: VSS on 3L NC  MOBILITY: Ax2 per ED, not OOB this shift  PAIN MANAGMENT: Denies pain  DIET: Mod CHO  BOWEL/BLADDER: Purewick in place, incontinent at times.   ABNL LAB/BG: D-dimer: 0.61, BUN: 48, Cr: 1.20.   DRAIN/DEVICES: PIV SL with int ABX  TELEMETRY RHYTHM: NA  SKIN: Rash/redness to abdominal and B breast folds. Blanchable and non-blachable redness to buttocks/coccyx with small scabbed area between buttocks.   TESTS/PROCEDURES: Need UA sample, purewick equipment clean. CT chest ordered.   D/C DAY/GOALS/PLACE: Pending improvement and workup.   OTHER IMPORTANT INFO: Tajik speaking, requires , jabber in room. Daughter may come to visit today, unsure. Pts  is also in the hospital at this time.

## 2021-07-13 LAB
GLUCOSE BLDC GLUCOMTR-MCNC: 107 MG/DL (ref 70–99)
GLUCOSE BLDC GLUCOMTR-MCNC: 159 MG/DL (ref 70–99)
GLUCOSE BLDC GLUCOMTR-MCNC: 190 MG/DL (ref 70–99)
GLUCOSE BLDC GLUCOMTR-MCNC: 198 MG/DL (ref 70–99)

## 2021-07-13 PROCEDURE — 258N000003 HC RX IP 258 OP 636: Performed by: EMERGENCY MEDICINE

## 2021-07-13 PROCEDURE — 99232 SBSQ HOSP IP/OBS MODERATE 35: CPT | Performed by: HOSPITALIST

## 2021-07-13 PROCEDURE — 250N000013 HC RX MED GY IP 250 OP 250 PS 637: Performed by: HOSPITALIST

## 2021-07-13 PROCEDURE — 250N000011 HC RX IP 250 OP 636: Performed by: HOSPITALIST

## 2021-07-13 PROCEDURE — 258N000003 HC RX IP 258 OP 636: Performed by: HOSPITALIST

## 2021-07-13 PROCEDURE — 120N000001 HC R&B MED SURG/OB

## 2021-07-13 PROCEDURE — 82962 GLUCOSE BLOOD TEST: CPT

## 2021-07-13 RX ADMIN — CYCLOBENZAPRINE 10 MG: 5 TABLET, FILM COATED ORAL at 09:50

## 2021-07-13 RX ADMIN — GABAPENTIN 600 MG: 300 CAPSULE ORAL at 22:24

## 2021-07-13 RX ADMIN — PIPERACILLIN SODIUM AND TAZOBACTAM SODIUM 2.25 G: 2; .25 INJECTION, POWDER, LYOPHILIZED, FOR SOLUTION INTRAVENOUS at 13:51

## 2021-07-13 RX ADMIN — DONEPEZIL HYDROCHLORIDE 10 MG: 10 TABLET ORAL at 22:24

## 2021-07-13 RX ADMIN — SIMVASTATIN 40 MG: 20 TABLET, FILM COATED ORAL at 22:24

## 2021-07-13 RX ADMIN — FLUTICASONE FUROATE 1 PUFF: 200 POWDER RESPIRATORY (INHALATION) at 09:54

## 2021-07-13 RX ADMIN — FLUOCINOLONE ACETONIDE: 0.25 OINTMENT TOPICAL at 20:20

## 2021-07-13 RX ADMIN — PIPERACILLIN SODIUM AND TAZOBACTAM SODIUM 2.25 G: 2; .25 INJECTION, POWDER, LYOPHILIZED, FOR SOLUTION INTRAVENOUS at 09:48

## 2021-07-13 RX ADMIN — LEVOTHYROXINE SODIUM 88 MCG: 88 TABLET ORAL at 09:50

## 2021-07-13 RX ADMIN — PIPERACILLIN SODIUM AND TAZOBACTAM SODIUM 2.25 G: 2; .25 INJECTION, POWDER, LYOPHILIZED, FOR SOLUTION INTRAVENOUS at 19:48

## 2021-07-13 RX ADMIN — AZITHROMYCIN MONOHYDRATE 250 MG: 500 INJECTION, POWDER, LYOPHILIZED, FOR SOLUTION INTRAVENOUS at 10:26

## 2021-07-13 RX ADMIN — PIPERACILLIN SODIUM AND TAZOBACTAM SODIUM 2.25 G: 2; .25 INJECTION, POWDER, LYOPHILIZED, FOR SOLUTION INTRAVENOUS at 01:05

## 2021-07-13 RX ADMIN — GABAPENTIN 600 MG: 300 CAPSULE ORAL at 18:16

## 2021-07-13 RX ADMIN — INSULIN ASPART 1 UNITS: 100 INJECTION, SOLUTION INTRAVENOUS; SUBCUTANEOUS at 13:52

## 2021-07-13 RX ADMIN — RIVAROXABAN 10 MG: 10 TABLET, FILM COATED ORAL at 18:16

## 2021-07-13 RX ADMIN — CITALOPRAM HYDROBROMIDE 20 MG: 20 TABLET ORAL at 09:51

## 2021-07-13 RX ADMIN — GABAPENTIN 600 MG: 300 CAPSULE ORAL at 09:50

## 2021-07-13 RX ADMIN — FLUOCINOLONE ACETONIDE: 0.25 OINTMENT TOPICAL at 09:54

## 2021-07-13 RX ADMIN — INSULIN ASPART 2 UNITS: 100 INJECTION, SOLUTION INTRAVENOUS; SUBCUTANEOUS at 18:16

## 2021-07-13 RX ADMIN — SODIUM CHLORIDE: 9 INJECTION, SOLUTION INTRAVENOUS at 10:26

## 2021-07-13 RX ADMIN — MEMANTINE HYDROCHLORIDE 28 MG: 28 CAPSULE, EXTENDED RELEASE ORAL at 09:50

## 2021-07-13 ASSESSMENT — ACTIVITIES OF DAILY LIVING (ADL)
ADLS_ACUITY_SCORE: 27

## 2021-07-13 NOTE — PLAN OF CARE
Date/Time: July 13, 2021 6:18 AM   Reason for Admission: covid + pna + TB ruleout    Cognitive/Mentation:x2; Guamanian speaking  Neuros/CMS:ex increased confusion, general weakness  VS:VSS on RA ex marko and on 2L O2; B/P: 104/45, T: 97.4, P: 70, R: 16     GI:denies n/v  :incontinent of bowel and bladder - purewick in place and patent  Pain:denies pain    IV: intermittent abx  Skin:mepilex on coccyx for excoriation, rash/reddened under breasts and abdominal folds  Activity:total care  Diet:regular    Discharge: pending    End of shift summary:using [employer] for  services overnight. Has room service and will need assistance to feed. Covid precautions maintained

## 2021-07-13 NOTE — PROGRESS NOTES
[facility]    Medicine Progress Note - Hospitalist Service       Date of Admission:  7/11/2021    Assessment & Plan              Nhi Perry is a markedly pleasant 89 year old woman with past medical history that is most notable for Type 2 Diabetes mellitus, remote history of pulmonary TB, and recently treated bilateral COVID pneumonia, among others; who presents with ongoing cough and is found to have suspected bilateral pneumonia.        Suspected bilateral pneumonia  Recent COVID 6/22  Of note, she tested positive for COVID on 6/22, and multiple other family members have either had or still have it. She herself went on to develop bilateral pneumonia causing hypoxia and was hospitalized at [facility] from 7/1 to 7/5/2021. There CTPA on 7/1 showed old granulomas and upper lobe pleural plaques. It seems she was treated for pulmonary TB in the [place] in the 1950's. In any event she was stabilized and weaned off oxygen and has now completed a course of Remdesivir and steroids. However she presents now for ongoing cough and worsening weakness. She has leukocytosis. CXR shows bilateral opacifications and upper lobe pleural plaques, seen on previous studies and possibly limiting the diagnotsic yield of the x-ray. She could have post-viral bacterial pneumonia, possibly symptoms related to ongoing COVID or post-COVID symptoms. Reactivated TB could be possible.  - COVID precautions continued for now.  - Rivaroxaban continued for VTE chemoprophylaxis.   - Continue empiric Zosyn and Azithromycin - defer to ID  - follow up cultures and Pro-calcitonin negative <0.05.   - CTPA repeated for further evaluation - no PE, worsened bilateral infiltrates noted  - appears nontoxic today, afebrile. ID following and appreciated.   - wean O2 as able  - 7/13 stable/improving - updated daughter on phone     Recent acute metabolic encephalopathy  Noted at [facility] this month. She could have been  sundowning in the setting of possible undiagnosed cognitive deficits. We should monitor for that happening again while she is here.  - appears stable and oriented     CKD III  Possible NILES appears resolved/improved  She has history reportedly of CKD Stage 3. Cr 1.20 and was 0.85 on 7/5/21. Could be hypovolemic. Mild.  - For now, encourage fluid intake and repeat labs in AM; if worsening could consider careful trial of IV fluid.   - BMP improved 7/12 - 7/13     Type 2 Diabetes mellitus  Most recent A1c 6.8 in 7/2021. On Metformin as an outpatient.  - ISS insulin while hospitalized.   - Hold metformin     Bigeminy  Her daughter reported low heart rate when checked at home today and on the monitor she has bradycardia but EKG at the same time show bigeminy with rate 72, likely leading to error in monitor readings.      Chronic anemia: HGB 11.1 and was 10.8 on 7/5/2021.     Hypothyroid: Resume Synthroid when verified  Dyslipidemia: Resume statin when verified  Hypertension: Resume Linsopril pending stable renal function      Diet: Combination Diet Regular Diet Adult; Consistent Carb 60 Grams CHO per Meal Diet    DVT Prophylaxis: Xarelto  Hawley Catheter: Not present  Central Lines: None  Code Status: Full Code      Disposition Plan   Expected discharge: 2+ days pending improvement, ID recs     The patient's care was discussed with the Bedside Nurse, Patient and Patient's Family.    Selvin Singer MD  Hospitalist Service  [facility]  Securely message with the Vocera Web Console (learn more here)  Text page via [employer] Paging/Directory      Risk Factors Present on Admission               ______________________________________________________________________    Interval History   Seen and examined in the morning. No new complaints, utilized ipad for interpreting however the wildly loud nieves gutier[employer] (hospital construction) was quite obtrusive to communication. Eventually, confirmed with patient  that she feels breathing is normal and she is not having pain, fevers, or chills. Still on O2. Continuing with abx as it appears to be likely a bacterial pneumonia.  Appreciate ID assistance.    Data reviewed today: I reviewed all medications, new labs and imaging results over the last 24 hours. I personally reviewed no images or EKG's today.    Physical Exam   Vital Signs: Temp: 97.3  F (36.3  C) Temp src: Axillary BP: 92/46 Pulse: 50   Resp: 18 SpO2: 97 % O2 Device: Nasal cannula Oxygen Delivery: 3 LPM  Weight: 150 lbs 5.66 oz    Gen: NAD, pleasant, Moroccan speaking, elderly  HEENT: Normocephalic, EOMI, MMM  Resp: no focal crackles,  no wheezes, no increased work of resp  CV: S1S2 heard, reg rhythm, reg rate, no pedal edema  Abdo: soft, nontender, nondistended, bowel sounds present  Ext: calves nontender, well perfused  [facility]: [facility], CN grossly intact, no facial asymmetry      Data   Recent Labs   Lab 07/13/21  1214 07/13/21  0856 07/12/21  2214 07/12/21  1012 07/11/21  1958   WBC  --   --   --  7.6 12.1*   HGB  --   --   --  10.8* 11.1*   MCV  --   --   --  94 93   PLT  --   --   --  197 258   NA  --   --   --  136 135   POTASSIUM  --   --   --  4.3 4.4   CHLORIDE  --   --   --  109 101   CO2  --   --   --  19* 23   BUN  --   --   --  42* 48*   CR  --   --   --  1.06* 1.20*   ANIONGAP  --   --   --  8 11   CHRIS  --   --   --  8.0* 8.8   * 107* 147* 110* 274*   ALBUMIN  --   --   --  2.0*  --    PROTTOTAL  --   --   --  5.9*  --    BILITOTAL  --   --   --  0.5  --    ALKPHOS  --   --   --  69  --    ALT  --   --   --  47  --    AST  --   --   --  34  --    TROPONIN  --   --   --   --  <0.015     No results found for this or any previous visit (from the past 24 hour(s)).

## 2021-07-13 NOTE — CONSULTS
Care Management Initial Consult    General Information  Assessment completed with: VM-chart review,    Type of CM/SW Visit: Offer D/C Planning    Primary Care Provider verified and updated as needed: Yes   Readmission within the last 30 days: previous discharge plan unsuccessful      Reason for Consult: discharge planning  Advance Care Planning:            Communication Assessment  Patient's communication style: spoken language (non-English)    Hearing Difficulty or Deaf: no   Wear Glasses or Blind: yes    Cognitive  Cognitive/Neuro/Behavioral: .WDL except  Level of Consciousness: alert  Arousal Level: opens eyes spontaneously  Orientation: disoriented to, situation, time  Mood/Behavior: calm, cooperative  Best Language: 0 - No aphasia  Speech: clear, spontaneous, logical (South African speaking)    Living Environment:   People in home: child(zeenat), adult     Current living Arrangements: house      Able to return to prior arrangements:         Family/Social Support:  Care provided by: child(zeenat)  Provides care for: no one, unable/limited ability to care for self  Marital Status:   Children          Description of Support System: Involved         Current Resources:   Patient receiving home care services: Yes     Community Resources:    Equipment currently used at home:  Wheelchair bound  Supplies currently used at home:  Unsure        Functional Status:  Prior to admission patient needed assistance: Total dependent with ADLs and IADLs  from daughter and Son-in law             Mental Health Status:JOSE          Chemical Dependency Status:None                Values/Beliefs:  Spiritual, Cultural Beliefs, Caodaism Practices, Values that affect care:   None              Additional Information:  Attempted several times to reach daughter at home VM left with her. From ED notes family is ill with COVID. Per last discharge note form SW on 7/3 patient was discharged home with home care services   Pt lives with Shana and  Shana's  Erin who are pt's caregivers. Shana is covid positive and so is her  (also a [facility] pt). Per Shana pt wants to go home at discharge and is adamant about not wanting to go to a SNF. Shana is agreeable to pt returning home with home care..    Andrews Nance RN  Inpatient Care Coordinator  [facility] [place]  # [phone]

## 2021-07-13 NOTE — PROGRESS NOTES
[facility]  Infectious Disease Progress Note          Assessment and Plan:   IMPRESSION:    1.  A 89-year-old female, recent COVID-19 that was symptomatic, now with recurrent worsening respiratory symptoms, second flare up since the diagnosis, likely COVID related damage but concern for secondary bacterial pneumonia.  2.  Recent COVID, likely COVID recovered at this point, but given the active respiratory symptoms, hard to exclude still contagious.  3.  [place] immigrant, prior history of tuberculosis and imaging compatible with old tuberculosis.  4.  Current CT scan with multiple changes, possible old asbestos, changes of old tuberculosis, some bilateral infiltrates that are worse, does not look like tuberculosis.  5.  Diabetes mellitus.     RECOMMENDATIONS:    1.  Continue COVID precautions for now given the active respiratory symptoms, but probably is COVID recovered.  Certainly no specific treatment for COVID indicated at this point.  2.  Conventional antibiotics as we are doing, is down to 1 liter of oxygen.  Does not look toxic or ill.  Possibility of conventional pneumonia is prominent.  3.  From a TB standpoint, no particular testing at this point, not really producing sputum.  I do not think she needs a bronchoscopy.  If she clinically improves with conventional antibiotics, would not pursue this further.        Interval History:   no new complaints and doing well; no cp, sob, n/v/d, or abd pain O2 stable.              Medications:       azithromycin  250 mg Intravenous Q24H     citalopram  20 mg Oral Daily     cyclobenzaprine  10 mg Oral Daily     donepezil  10 mg Oral At Bedtime     fluocinolone   Topical BID     fluticasone  1 puff Inhalation Daily     gabapentin  600 mg Oral TID     insulin aspart  1-7 Units Subcutaneous TID AC     insulin aspart  1-5 Units Subcutaneous At Bedtime     levothyroxine  88 mcg Oral Daily     lisinopril  40 mg Oral Daily     memantine XR  28 mg Oral  Daily     metoprolol succinate ER  25 mg Oral Daily     piperacillin-tazobactam  2.25 g Intravenous Q6H     rivaroxaban ANTICOAGULANT  10 mg Oral Daily with supper     simvastatin  40 mg Oral At Bedtime     sodium chloride (PF)  3 mL Intracatheter Q8H                  Physical Exam:   Blood pressure 92/46, pulse 50, temperature 97.3  F (36.3  C), temperature source Axillary, resp. rate 18, weight 68.2 kg (150 lb 5.7 oz), SpO2 92 %.  Wt Readings from Last 2 Encounters:   07/12/21 68.2 kg (150 lb 5.7 oz)   07/29/14 84.1 kg (185 lb 4.8 oz)     Vital Signs with Ranges  Temp:  [97.3  F (36.3  C)-97.6  F (36.4  C)] 97.3  F (36.3  C)  Pulse:  [50-76] 50  Resp:  [16-20] 18  BP: ()/(45-52) 92/46  SpO2:  [92 %-94 %] 92 %    Constitutional: Awake, alert, cooperative, no apparent distress looks OK   Lungs: Clear to auscultation bilaterally, no crackles or wheezing   Cardiovascular: Regular rate and rhythm, normal S1 and S2, and no murmur noted   Abdomen: Normal bowel sounds, soft, non-distended, non-tender   Skin: No rashes, no cyanosis, no edema   Other:           Data:   All microbiology laboratory data reviewed.  Recent Labs   Lab Test 07/12/21 1012 07/11/21 1958 07/05/21  0643   WBC 7.6 12.1* 7.8   HGB 10.8* 11.1* 10.8*   HCT 34.4* 34.1* 32.6*   MCV 94 93 92    258 272     Recent Labs   Lab Test 07/12/21 1012 07/11/21 1958 07/05/21  0643   CR 1.06* 1.20* 0.85     No lab results found.  No lab results found.    Invalid input(s): ANABEL

## 2021-07-13 NOTE — PLAN OF CARE
Cognitive Concerns/ Orientation : A&Ox2; disoriented to time and situation. Filipino speaking, Jabber utilized.   BEHAVIOR & AGGRESSION TOOL COLOR: Green  CIWA SCORE: n/a  ABNL VS/O2: VSS on 2-3L NC to maintain sats mid 90s. HR occasinally decreases to 30s, recovers quickly; MD notified, tele ordered. BPs soft (90s/40s), am BP meds held. Afebrile.   MOBILITY: Total cares, nonambulatory at baseline. Assist of 2 to turn/repo q2hr. Refuses turns at times.   PAIN MANAGMENT: Denies pain.   DIET: Mod CHO, assist with feeding. Tolerating diet, good appetite.   BOWEL/BLADDER: Purewick in place, has not voided since this am, bladder scanned for 672, paged MD for straight cath order, awaiting response. No BM this shift  ABNL LAB/BG: , 159, 190.   DRAIN/DEVICES: PIV, now saline locked, intermittent abx.   TELEMETRY RHYTHM: n/a  SKIN: Rash/redness to abdominal and breast folds, powder applied. Mepilex on coccyx CDI. Extremities cool and mottled.   TESTS/PROCEDURES: n/a  D/C DAY/GOALS/PLACE: 2+ days, pending improvement and workup, and ID recommendations   OTHER IMPORTANT INFO: LS diminished, infrequent nonproductive cough. ID following.

## 2021-07-13 NOTE — PLAN OF CARE
Summary: COVID pneumonia, TB r/o   DATE & TIME: 7/12/21 7 3-11pm  Cognitive Concerns/ Orientation : AOx2; disoriented to time and situation. Speaks Eritrean. Using Jabber in room.   BEHAVIOR & AGGRESSION TOOL COLOR: Green  CIWA SCORE: NA   ABNL VS/O2: VSS on 1L NC, sat b/w 93-94%  MOBILITY: A2 as per ED, not OOB this shift. Turn and reposition q 2hrs.  PAIN MANAGMENT: Denies.  DIET: Mod CHO. Assist with feeding.  BOWEL/BLADDER: Purewick in place, no BM this shift  ABNL LAB/BG:  and 147. Hgb 10.8, Cr 1.06  DRAIN/DEVICES: PIV SL with int ABX  TELEMETRY RHYTHM: NA  SKIN: Rash/redness to abdominal and B breast folds. Mepilex on coccyx CDI  TESTS/PROCEDURES: UA came back positive, CT chest done  D/C DAY/GOALS/PLACE: Pending improvement and workup, ID following  OTHER IMPORTANT INFO: on iv Zosyn and zithromax.

## 2021-07-13 NOTE — PROGRESS NOTES
MD Notification    Notified Person: MD    Notified Person Name: Dr. Connor    Notification Date/Time: textpage    Notification Interaction:    Purpose of Notification: FYI: put pt on tele d/t low HR (in 30s) - rhythm SR first degree block bigeminal PVCs. Do you want electrolytes drawn?    Orders Received:    Comments:

## 2021-07-14 ENCOUNTER — APPOINTMENT (OUTPATIENT)
Dept: PHYSICAL THERAPY | Facility: CLINIC | Age: 86
DRG: 194 | End: 2021-07-14
Attending: HOSPITALIST
Payer: COMMERCIAL

## 2021-07-14 LAB
BACTERIA UR CULT: ABNORMAL
GLUCOSE BLDC GLUCOMTR-MCNC: 162 MG/DL (ref 70–99)
GLUCOSE BLDC GLUCOMTR-MCNC: 176 MG/DL (ref 70–99)
GLUCOSE BLDC GLUCOMTR-MCNC: 180 MG/DL (ref 70–99)
GLUCOSE BLDC GLUCOMTR-MCNC: 226 MG/DL (ref 70–99)
GLUCOSE BLDC GLUCOMTR-MCNC: 259 MG/DL (ref 70–99)

## 2021-07-14 PROCEDURE — 250N000013 HC RX MED GY IP 250 OP 250 PS 637: Performed by: HOSPITALIST

## 2021-07-14 PROCEDURE — 97530 THERAPEUTIC ACTIVITIES: CPT | Mod: GP

## 2021-07-14 PROCEDURE — 99232 SBSQ HOSP IP/OBS MODERATE 35: CPT | Performed by: HOSPITALIST

## 2021-07-14 PROCEDURE — 250N000011 HC RX IP 250 OP 636: Performed by: HOSPITALIST

## 2021-07-14 PROCEDURE — 120N000001 HC R&B MED SURG/OB

## 2021-07-14 PROCEDURE — 99207 PR CDG-CODE CATEGORY CHANGED: CPT | Performed by: HOSPITALIST

## 2021-07-14 PROCEDURE — 97161 PT EVAL LOW COMPLEX 20 MIN: CPT | Mod: GP

## 2021-07-14 PROCEDURE — 258N000003 HC RX IP 258 OP 636: Performed by: HOSPITALIST

## 2021-07-14 PROCEDURE — 82962 GLUCOSE BLOOD TEST: CPT

## 2021-07-14 RX ORDER — PIPERACILLIN SODIUM, TAZOBACTAM SODIUM 3; .375 G/15ML; G/15ML
3.38 INJECTION, POWDER, LYOPHILIZED, FOR SOLUTION INTRAVENOUS EVERY 6 HOURS
Status: DISCONTINUED | OUTPATIENT
Start: 2021-07-14 | End: 2021-07-15

## 2021-07-14 RX ADMIN — DONEPEZIL HYDROCHLORIDE 10 MG: 10 TABLET ORAL at 21:34

## 2021-07-14 RX ADMIN — INSULIN ASPART 1 UNITS: 100 INJECTION, SOLUTION INTRAVENOUS; SUBCUTANEOUS at 19:47

## 2021-07-14 RX ADMIN — RIVAROXABAN 10 MG: 10 TABLET, FILM COATED ORAL at 16:53

## 2021-07-14 RX ADMIN — FLUOCINOLONE ACETONIDE: 0.25 OINTMENT TOPICAL at 21:47

## 2021-07-14 RX ADMIN — FLUTICASONE FUROATE 1 PUFF: 200 POWDER RESPIRATORY (INHALATION) at 09:12

## 2021-07-14 RX ADMIN — MEMANTINE HYDROCHLORIDE 28 MG: 28 CAPSULE, EXTENDED RELEASE ORAL at 09:09

## 2021-07-14 RX ADMIN — PIPERACILLIN SODIUM AND TAZOBACTAM SODIUM 3.38 G: 3; .375 INJECTION, POWDER, LYOPHILIZED, FOR SOLUTION INTRAVENOUS at 19:47

## 2021-07-14 RX ADMIN — GABAPENTIN 600 MG: 300 CAPSULE ORAL at 21:33

## 2021-07-14 RX ADMIN — METOPROLOL SUCCINATE 25 MG: 25 TABLET, EXTENDED RELEASE ORAL at 09:08

## 2021-07-14 RX ADMIN — GABAPENTIN 600 MG: 300 CAPSULE ORAL at 09:09

## 2021-07-14 RX ADMIN — LISINOPRIL 40 MG: 40 TABLET ORAL at 09:08

## 2021-07-14 RX ADMIN — INSULIN ASPART 1 UNITS: 100 INJECTION, SOLUTION INTRAVENOUS; SUBCUTANEOUS at 10:19

## 2021-07-14 RX ADMIN — GABAPENTIN 600 MG: 300 CAPSULE ORAL at 16:53

## 2021-07-14 RX ADMIN — PIPERACILLIN SODIUM AND TAZOBACTAM SODIUM 3.38 G: 3; .375 INJECTION, POWDER, LYOPHILIZED, FOR SOLUTION INTRAVENOUS at 13:56

## 2021-07-14 RX ADMIN — CYCLOBENZAPRINE 10 MG: 5 TABLET, FILM COATED ORAL at 09:08

## 2021-07-14 RX ADMIN — CITALOPRAM HYDROBROMIDE 20 MG: 20 TABLET ORAL at 09:09

## 2021-07-14 RX ADMIN — INSULIN ASPART 1 UNITS: 100 INJECTION, SOLUTION INTRAVENOUS; SUBCUTANEOUS at 13:56

## 2021-07-14 RX ADMIN — FLUOCINOLONE ACETONIDE: 0.25 OINTMENT TOPICAL at 09:12

## 2021-07-14 RX ADMIN — LEVOTHYROXINE SODIUM 88 MCG: 88 TABLET ORAL at 09:09

## 2021-07-14 RX ADMIN — AZITHROMYCIN MONOHYDRATE 250 MG: 500 INJECTION, POWDER, LYOPHILIZED, FOR SOLUTION INTRAVENOUS at 10:14

## 2021-07-14 RX ADMIN — PIPERACILLIN SODIUM AND TAZOBACTAM SODIUM 2.25 G: 2; .25 INJECTION, POWDER, LYOPHILIZED, FOR SOLUTION INTRAVENOUS at 09:00

## 2021-07-14 RX ADMIN — PIPERACILLIN SODIUM AND TAZOBACTAM SODIUM 2.25 G: 2; .25 INJECTION, POWDER, LYOPHILIZED, FOR SOLUTION INTRAVENOUS at 02:05

## 2021-07-14 RX ADMIN — SIMVASTATIN 40 MG: 20 TABLET, FILM COATED ORAL at 21:33

## 2021-07-14 ASSESSMENT — ACTIVITIES OF DAILY LIVING (ADL)
ADLS_ACUITY_SCORE: 29
ADLS_ACUITY_SCORE: 31
ADLS_ACUITY_SCORE: 29
ADLS_ACUITY_SCORE: 31
ADLS_ACUITY_SCORE: 27
ADLS_ACUITY_SCORE: 29

## 2021-07-14 NOTE — PROGRESS NOTES
07/14/21 1500   Quick Adds   Type of Visit Initial PT Evaluation       Present yes  (Sabra)   Language Surinamese   Living Environment   People in home child(zeenat), adult   Current Living Arrangements house   Home Accessibility stairs to enter home   Number of Stairs, Main Entrance 2   Self-Care   Usual Activity Tolerance poor   Current Activity Tolerance poor   Regular Exercise No   Equipment Currently Used at Home walker, rolling   Activity/Exercise/Self-Care Comment At baseline, pt does not ambulate but transfers with Ax1 of her daughter   Disability/Function   Fall history within last six months yes   Number of times patient has fallen within last six months 1   General Information   Onset of Illness/Injury or Date of Surgery 07/11/21   Referring Physician Selvin Singer MD   Patient/Family Therapy Goals Statement (PT) Return home with daughter   Pertinent History of Current Problem (include personal factors and/or comorbidities that impact the POC) Pt admitted with a cough, B pneumonia, recent COVID + test 6/22. PMH: CKD3, DM2, chronic anemia, HTN, hypothytroid.   Existing Precautions/Restrictions fall   Weight-Bearing Status - LLE full weight-bearing   Weight-Bearing Status - RLE full weight-bearing   General Observations Per chart, pt's daughter is at home also with COVID.   Cognition   Orientation Status (Cognition) person;place   Follows Commands (Cognition) follows one-step commands   Pain Assessment   Patient Currently in Pain No   Posture    Posture Forward head position;Protracted shoulders;Kyphosis   Range of Motion (ROM)   ROM Quick Adds ROM WNL   Strength   Strength Comments B LEs functionally weak   Bed Mobility   Comment (Bed Mobility) Supine to sit with mod A   Transfers   Transfer Safety Comments Attempted to stand with max  A and pt only able to clear her bottom, could not stand upright.   Gait/Stairs (Locomotion)   Comment (Gait/Stairs) NA, pt does not ambulate at baseline    Balance   Balance Comments SBA sitting balance EOB   Clinical Impression   Criteria for Skilled Therapeutic Intervention yes, treatment indicated   PT Diagnosis (PT) Difficulty transferring   Influenced by the following impairments Dec strength, balance, activity tolerance, SOB   Functional limitations due to impairments Difficulty transferring   Clinical Presentation Evolving/Changing   Clinical Presentation Rationale medically improving   Clinical Decision Making (Complexity) low complexity   Therapy Frequency (PT) 5x/week   Predicted Duration of Therapy Intervention (days/wks) 1 week   Planned Therapy Interventions (PT) bed mobility training;neuromuscular re-education;strengthening;transfer training   Risk & Benefits of therapy have been explained evaluation/treatment results reviewed;care plan/treatment goals reviewed;risks/benefits reviewed;current/potential barriers reviewed;participants voiced agreement with care plan   PT Discharge Planning    PT Discharge Recommendation (DC Rec) Transitional Care Facility;home with home care physical therapy   PT Rationale for DC Rec At this time, pt is unable to stand or transfer to a . Per chart, family wants pt to return home and is strongly against TCU. Would need use of lift and home PT at discharge for pt to reutrn home safely.   PT Brief overview of current status  Mod A supine to sit, Max A attempt to stand with pt only able to clear her bottom, unsteady. Sit to supine with max A.   Total Evaluation Time   Total Evaluation Time (Minutes) 10

## 2021-07-14 NOTE — PLAN OF CARE
"07/14/2021 Night shift  Cognitive Concerns/ Orientation : A&Ox3; disoriented to time. Forgetful.  Singaporean speaking, Jabber.  BEHAVIOR & AGGRESSION TOOL COLOR: Green  ABNL VS/O2: VSS on 2L NC to maintain sats mid 90s. BPs soft (90s/40s), Afebrile this shift. HR in 70s.   MOBILITY: Total cares, nonambulatory at baseline. Assist of 2 to turn/repo q2hr. Refuses turns at times. Lift for transfers.   PAIN MANAGMENT: Denies pain.   DIET: Mod CHO, assist with feeding.   BOWEL/BLADDER: Purewick in place. No urine output; Bladder scanned for 350cc; but pt wanted to wait before st cath, \"I think I will be able to go on my own\".  St cath done at 5:45 AM for 925 ml out. No BM this shift  ABNL LAB/BG:   DRAIN/DEVICES: PIV, SL, intermittent abx.   TELEMETRY RHYTHM: n/a  SKIN: Rash/redness to abdominal and breast folds, powder applied. Mepilex on coccyx CDI. Jonathan LE mehran/pale.   TESTS/PROCEDURES: n/a  D/C DAY/GOALS/PLACE: Pending; ID is following.   OTHER IMPORTANT INFO: LS diminished, infrequent nonproductive cough.   "

## 2021-07-14 NOTE — PROGRESS NOTES
[facility]  Infectious Disease Progress Note          Assessment and Plan:   IMPRESSION:    1.  A 89-year-old female, recent COVID-19 that was symptomatic, now with recurrent worsening respiratory symptoms, second flare up since the diagnosis, likely COVID related damage but concern for secondary bacterial pneumonia.  2.  Recent COVID, likely COVID recovered at this point, but given the active respiratory symptoms, hard to exclude still contagious.  3.  [place] immigrant, prior history of tuberculosis and imaging compatible with old tuberculosis.  4.  Current CT scan with multiple changes, possible old asbestos, changes of old tuberculosis, some bilateral infiltrates that are worse, does not look like tuberculosis.  5.  Diabetes mellitus.     RECOMMENDATIONS:    1.  Continue COVID precautions for now given the active respiratory symptoms, but probably is COVID recovered.  Certainly no specific treatment for COVID indicated at this point.  2.  Conventional antibiotics as we are doing, is down to 1-2  liter of oxygen.  Does not look toxic or ill.  Possibility of conventional pneumonia is prominent, but no fever, procal low etc.  3.  From a TB standpoint, no particular testing at this point, not really producing sputum.  I do not think she needs a bronchoscopy.  If she clinically improves with conventional antibiotics and time, would not pursue this further.  4 ? Just complete zpak and 1 week augmentin po as soon as any time and disposition        Interval History:   no new complaints and doing well; no cp, sob, n/v/d, or abd pain O2 stable.              Medications:       azithromycin  250 mg Intravenous Q24H     citalopram  20 mg Oral Daily     cyclobenzaprine  10 mg Oral Daily     donepezil  10 mg Oral At Bedtime     fluocinolone   Topical BID     fluticasone  1 puff Inhalation Daily     gabapentin  600 mg Oral TID     insulin aspart  1-7 Units Subcutaneous TID AC     insulin aspart  1-5  Units Subcutaneous At Bedtime     levothyroxine  88 mcg Oral Daily     lisinopril  40 mg Oral Daily     memantine XR  28 mg Oral Daily     metoprolol succinate ER  25 mg Oral Daily     piperacillin-tazobactam  2.25 g Intravenous Q6H     rivaroxaban ANTICOAGULANT  10 mg Oral Daily with supper     simvastatin  40 mg Oral At Bedtime     sodium chloride (PF)  3 mL Intracatheter Q8H                  Physical Exam:   Blood pressure 93/48, pulse 78, temperature 97.8  F (36.6  C), temperature source Oral, resp. rate 16, weight 68.2 kg (150 lb 5.7 oz), SpO2 94 %.  Wt Readings from Last 2 Encounters:   07/12/21 68.2 kg (150 lb 5.7 oz)   07/29/14 84.1 kg (185 lb 4.8 oz)     Vital Signs with Ranges  Temp:  [97.4  F (36.3  C)-97.8  F (36.6  C)] 97.8  F (36.6  C)  Pulse:  [41-78] 78  Resp:  [16-18] 16  BP: ()/(40-48) 93/48  SpO2:  [94 %-100 %] 94 %    Constitutional: Awake, alert, cooperative, no apparent distress looks OK   Lungs: Clear to auscultation bilaterally, no crackles or wheezing   Cardiovascular: Regular rate and rhythm, normal S1 and S2, and no murmur noted   Abdomen: Normal bowel sounds, soft, non-distended, non-tender   Skin: No rashes, no cyanosis, no edema   Other:           Data:   All microbiology laboratory data reviewed.  Recent Labs   Lab Test 07/12/21 1012 07/11/21 1958 07/05/21  0643   WBC 7.6 12.1* 7.8   HGB 10.8* 11.1* 10.8*   HCT 34.4* 34.1* 32.6*   MCV 94 93 92    258 272     Recent Labs   Lab Test 07/12/21 1012 07/11/21 1958 07/05/21  0643   CR 1.06* 1.20* 0.85     No lab results found.  No lab results found.    Invalid input(s):

## 2021-07-14 NOTE — PLAN OF CARE
07/14/2021 7-3pm  Cognitive Concerns/ Orientation : A&Ox2; disoriented to place & time. Forgetful.  Guamanian speaking, Jabber.  BEHAVIOR & AGGRESSION TOOL COLOR: Green  ABNL VS/O2: VSS on 2L NC to maintain sats mid 90s. BPs soft (90s/40s), Afebrile   MOBILITY: Total cares, nonambulatory at baseline. Assist of 2 to turn/repo q2hr. Refuses turns at times. Lift for transfers.   PAIN MANAGMENT: Denies pain.   DIET: Mod CHO, assist with feeding.   BOWEL/BLADDER:No urine output; Bladder scanned for 386cc (3rd straight cath) pena placed,No BM this shift  ABNL LAB/BG: /176  DRAIN/DEVICES: PIV, SL, intermittent abx.   TELEMETRY RHYTHM: n/a  SKIN: Rash/redness to abdominal and breast folds, powder applied. Mepilex on coccyx CDI. Jonathan LE mehran/pale.   TESTS/PROCEDURES: n/a  D/C DAY/GOALS/PLACE: Pending; ID is following.   OTHER IMPORTANT INFO: LS fine crackles/diminished, infrequent nonproductive cough.

## 2021-07-14 NOTE — PLAN OF CARE
DATE & TIME: 7/13/21 1900-2330  Cognitive Concerns/ Orientation : A&Ox2; disoriented to time and situation. Syrian speaking, Jabber utilized for all communication.  BEHAVIOR & AGGRESSION TOOL COLOR: Green  CIWA SCORE: NA  ABNL VS/O2: VSS on 2L NC marko at times  MOBILITY: Total cares. Assist x2 T/R q2hr.  PAIN MANAGMENT: Denies pain.   DIET: Mod CHO, assist with feeding. Tolerating diet, good appetite.   BOWEL/BLADDER: Purewick in place. Straight cath x1 for 1000ml around 2020  ABNL LAB/BG:  at bedtime  DRAIN/DEVICES: PIV, with intermittent anx  TELEMETRY RHYTHM: SR w/ frequent PACs  SKIN: Rash/redness to abdominal and breast folds, powder applied. Mepilex on coccyx CDI. BUE/BLE cool/ mehran mottled.  TESTS/PROCEDURES: n/a  D/C DAY/GOALS/PLACE: pending improvement and workup  OTHER IMPORTANT INFO: Lung sounds diminished. Infrequent nonproductive cough.

## 2021-07-14 NOTE — PROGRESS NOTES
"BRIEF NUTRITION ASSESSMENT    REASON FOR ASSESSMENT:  Nhi Perry is a 89 year old female seen by Registered Dietitian for Nutrition Admission Risk Screen Received -   Have you recently lost weight without trying in the last 6 months ? - \"unsure\"  Have you been eating poorly due to a decreased appetite ?- \"no\"    NUTRITION HISTORY:  Discussed recent oral intake with patient's daughter via telephone.   She has been eating well lately. Did have reduced appetite when she was sick with COVID, but it has since improved to baseline. She is not picky, will eat most everything that is provided to her.   She does need assistance with eating.    Breakfast: sour cream on rye toast, banana, oatmeal, milk, honey. No cold cereal.   Lunch: chicken, soup, vegetable, mashed potato, brown rice, yogurt, egg.   Dinner: soup, potato, tea w/ honey, chicken, rice..    CURRENT DIET AND INTAKE:  Diet: Moderate Consistent CHO   Good appetite reported. % intakes recorded.   In three meals yesterday patient received up to 1432 kcal and 73 g protein.     ANTHROPOMETRICS:  Height: 5' 1\"  Weight:  150 lbs 5.66 oz (68.2 kg)   Body mass index is 28.41 kg/m .   Weight Status: Overweight BMI 25-29.9  IBW:  47.7 kg   %IBW: 143%  Weight History: Daughter mentioned some wt loss during illness, but was unable to quantify. She notes pt has since stabilized.   Wt Readings from Last 10 Encounters:   07/12/21 68.2 kg (150 lb 5.7 oz)   07/29/14 84.1 kg (185 lb 4.8 oz)   04/20/12 73.9 kg (163 lb)       LABS:  Reviewed  Recent Labs   Lab 07/14/21  0203 07/13/21  2051 07/13/21  1809 07/13/21  1214 07/13/21  0856 07/12/21  2214   * 198* 190* 159* 107* 147*     Lab Results   Component Value Date    A1C 6.8 07/01/2021    A1C 6.1 07/28/2014    A1C 6.8 04/22/2012       MALNUTRITION:  Visual Nutrition Focused Physical Assessment (NFPA) not completed due to restrictions on face-to-face patient care for those in COVID-19 precautions. Do not suspect " acute muscle/fat losses.  Patient does not meet two of the following criteria necessary for diagnosing malnutrition.     % Weight Loss: Unable to fully assess  % Intake:  Decreased intake does not meet criteria for malnutrition - improved since ill w/ COVID  Subcutaneous Fat Loss:  Do not suspect   Muscle Loss:  Do not suspect  Fluid Retention:  None noted    NUTRITION INTERVENTION:  Nutrition Diagnosis:  No nutrition diagnosis at this time.    Implementation:  Nutrition Education:  Per Provider order if indicated    FOLLOW UP/MONITORING:   Will re-evaluate in 7 - 10 days, or sooner, if re-consulted.    Sharon Cardenas RD,   [facility] [place], [address], [facility]   Pager: [phone]  Weekend Pager: [phone]

## 2021-07-14 NOTE — PROGRESS NOTES
[facility]    Medicine Progress Note - Hospitalist Service       Date of Admission:  7/11/2021    Assessment & Plan            Nhi Perry is a markedly pleasant 89 year old woman with past medical history that is most notable for Type 2 Diabetes mellitus, remote history of pulmonary TB, and recently treated bilateral COVID pneumonia, among others; who presents with ongoing cough and is found to have suspected bilateral pneumonia.        Suspected bilateral pneumonia  Recent COVID 6/22  Of note, she tested positive for COVID on 6/22, and multiple other family members have either had or still have it. She herself went on to develop bilateral pneumonia causing hypoxia and was hospitalized at [facility] from 7/1 to 7/5/2021. There CTPA on 7/1 showed old granulomas and upper lobe pleural plaques. It seems she was treated for pulmonary TB in the [place] in the 1950's. In any event she was stabilized and weaned off oxygen and has now completed a course of Remdesivir and steroids. However she presents now for ongoing cough and worsening weakness. She has leukocytosis. CXR shows bilateral opacifications and upper lobe pleural plaques, seen on previous studies and possibly limiting the diagnotsic yield of the x-ray. She could have post-viral bacterial pneumonia, possibly symptoms related to ongoing COVID or post-COVID symptoms. Reactivated TB could be possible.  - COVID precautions continued for now.  - Rivaroxaban continued for VTE chemoprophylaxis.   - Continue empiric Zosyn and Azithromycin - defer to ID  - follow up cultures and Pro-calcitonin negative <0.05.   - CTPA repeated for further evaluation - no PE, worsened bilateral infiltrates noted  - appears nontoxic today, afebrile. ID following and appreciated.   - 7/13 - 7/14 stable/improving - updated daughter on phone  - wean O2 as able vs consider home O2  - likely transition to PO abx per ID  - possible discharge home with home  care 7/15    Recent acute metabolic encephalopathy  Noted at [facility] this month. She could have been sundowning in the setting of possible undiagnosed cognitive deficits. We should monitor for that happening again while she is here.  - appears stable and oriented     CKD III  Possible NILES appears resolved/improved  She has history reportedly of CKD Stage 3. Cr 1.20 and was 0.85 on 7/5/21. Could be hypovolemic. Mild.  - For now, encourage fluid intake and repeat labs in AM; if worsening could consider careful trial of IV fluid.   - BMP improved 7/12 -> 7/13     Type 2 Diabetes mellitus  Most recent A1c 6.8 in 7/2021. On Metformin as an outpatient.  - ISS insulin while hospitalized.   - Hold metformin     Bigeminy  Her daughter reported low heart rate when checked at home today and on the monitor she has bradycardia but EKG at the same time show bigeminy with rate 72, likely leading to error in monitor readings.      Chronic anemia: HGB 11.1 and was 10.8 on 7/5/2021.     Hypothyroid: Resumed Synthroid  Dyslipidemia: Resumed statin   Hypertension: Resumed Lisinopril given improved renal function      Diet: Combination Diet Regular Diet Adult; Consistent Carb 60 Grams CHO per Meal Diet    DVT Prophylaxis: xarelto  Hawley Catheter: PRESENT, indication:    Central Lines: None  Code Status: Full Code      Disposition Plan   Expected discharge: Possibly home where he has assistance from family and potentially addition of home care if they are agreeable.  TCU recommended per PT however they are not in favor of this.     The patient's care was discussed with the Bedside Nurse, Patient and Patient's Family.    Selvin Singer MD  Hospitalist Service  [facility]  Securely message with the Vocera Web Console (learn more here)  Text page via [employer] Paging/Directory      Risk Factors Present on Admission               ______________________________________________________________________    Interval  History   Patient seen and examined this afternoon.  indeni  utilized.  No new complaints, denies fevers, chills, shortness of breath but cough is ongoing.  No chest pain.  Explained to the patient that presumed bacterial pneumonia has improved clinically and on labs with antibiotics and she may be able to be leaving the hospital tomorrow.  She is very happy to hear this.  I updated family on phone.    Data reviewed today: I reviewed all medications, new labs and imaging results over the last 24 hours. I personally reviewed no images or EKG's today.    Physical Exam   Vital Signs: Temp: 97.4  F (36.3  C) Temp src: Axillary BP: 105/55 Pulse: 80   Resp: 16 SpO2: 92 % O2 Device: None (Room air) Oxygen Delivery: 2 LPM  Weight: 150 lbs 5.66 oz    Gen: NAD, pleasant, elderly, [place] speaking - Jabber used on ipad  HEENT: Normocephalic, EOMI, MMM  Resp: no focal crackles,  no wheezes, no increased work of resp  CV: S1S2 heard, reg rhythm, reg rate, no pedal edema  Abdo: soft, nontender, nondistended, bowel sounds present  Ext: calves nontender, well perfused  Neuro: AAOx self and hospital, CN grossly intact, no facial asymmetry      Data   Recent Labs   Lab 07/14/21  1352 07/14/21  1015 07/14/21  0203 07/12/21  1012 07/11/21 1958   WBC  --   --   --  7.6 12.1*   HGB  --   --   --  10.8* 11.1*   MCV  --   --   --  94 93   PLT  --   --   --  197 258   NA  --   --   --  136 135   POTASSIUM  --   --   --  4.3 4.4   CHLORIDE  --   --   --  109 101   CO2  --   --   --  19* 23   BUN  --   --   --  42* 48*   CR  --   --   --  1.06* 1.20*   ANIONGAP  --   --   --  8 11   CHRIS  --   --   --  8.0* 8.8   * 162* 226* 110* 274*   ALBUMIN  --   --   --  2.0*  --    PROTTOTAL  --   --   --  5.9*  --    BILITOTAL  --   --   --  0.5  --    ALKPHOS  --   --   --  69  --    ALT  --   --   --  47  --    AST  --   --   --  34  --    TROPONIN  --   --   --   --  <0.015     No results found for this or any previous visit (from  the past 24 hour(s)).

## 2021-07-15 ENCOUNTER — APPOINTMENT (OUTPATIENT)
Dept: PHYSICAL THERAPY | Facility: CLINIC | Age: 86
DRG: 194 | End: 2021-07-15
Payer: COMMERCIAL

## 2021-07-15 LAB
ERYTHROCYTE [DISTWIDTH] IN BLOOD BY AUTOMATED COUNT: 13 % (ref 10–15)
GLUCOSE BLDC GLUCOMTR-MCNC: 176 MG/DL (ref 70–99)
GLUCOSE BLDC GLUCOMTR-MCNC: 187 MG/DL (ref 70–99)
GLUCOSE BLDC GLUCOMTR-MCNC: 188 MG/DL (ref 70–99)
GLUCOSE BLDC GLUCOMTR-MCNC: 220 MG/DL (ref 70–99)
GLUCOSE BLDC GLUCOMTR-MCNC: 239 MG/DL (ref 70–99)
HCT VFR BLD AUTO: 29.4 % (ref 35–47)
HGB BLD-MCNC: 9.5 G/DL (ref 11.7–15.7)
MCH RBC QN AUTO: 30.6 PG (ref 26.5–33)
MCHC RBC AUTO-ENTMCNC: 32.3 G/DL (ref 31.5–36.5)
MCV RBC AUTO: 95 FL (ref 78–100)
PLATELET # BLD AUTO: 174 10E3/UL (ref 150–450)
RBC # BLD AUTO: 3.1 10E6/UL (ref 3.8–5.2)
WBC # BLD AUTO: 7.1 10E3/UL (ref 4–11)

## 2021-07-15 PROCEDURE — 258N000003 HC RX IP 258 OP 636: Performed by: HOSPITALIST

## 2021-07-15 PROCEDURE — 250N000013 HC RX MED GY IP 250 OP 250 PS 637: Performed by: HOSPITALIST

## 2021-07-15 PROCEDURE — 120N000001 HC R&B MED SURG/OB

## 2021-07-15 PROCEDURE — 36415 COLL VENOUS BLD VENIPUNCTURE: CPT | Performed by: HOSPITALIST

## 2021-07-15 PROCEDURE — 85027 COMPLETE CBC AUTOMATED: CPT | Performed by: HOSPITALIST

## 2021-07-15 PROCEDURE — 97530 THERAPEUTIC ACTIVITIES: CPT | Mod: GP

## 2021-07-15 PROCEDURE — 250N000011 HC RX IP 250 OP 636: Performed by: HOSPITALIST

## 2021-07-15 PROCEDURE — 97110 THERAPEUTIC EXERCISES: CPT | Mod: GP

## 2021-07-15 RX ORDER — PIPERACILLIN SODIUM, TAZOBACTAM SODIUM 2; .25 G/10ML; G/10ML
2.25 INJECTION, POWDER, LYOPHILIZED, FOR SOLUTION INTRAVENOUS EVERY 6 HOURS
Status: DISCONTINUED | OUTPATIENT
Start: 2021-07-15 | End: 2021-07-16 | Stop reason: HOSPADM

## 2021-07-15 RX ADMIN — RIVAROXABAN 10 MG: 10 TABLET, FILM COATED ORAL at 17:27

## 2021-07-15 RX ADMIN — FLUOCINOLONE ACETONIDE: 0.25 OINTMENT TOPICAL at 09:14

## 2021-07-15 RX ADMIN — GABAPENTIN 600 MG: 300 CAPSULE ORAL at 08:59

## 2021-07-15 RX ADMIN — GABAPENTIN 600 MG: 300 CAPSULE ORAL at 16:02

## 2021-07-15 RX ADMIN — DONEPEZIL HYDROCHLORIDE 10 MG: 10 TABLET ORAL at 22:31

## 2021-07-15 RX ADMIN — INSULIN ASPART 2 UNITS: 100 INJECTION, SOLUTION INTRAVENOUS; SUBCUTANEOUS at 12:13

## 2021-07-15 RX ADMIN — LISINOPRIL 40 MG: 40 TABLET ORAL at 08:59

## 2021-07-15 RX ADMIN — MEMANTINE HYDROCHLORIDE 28 MG: 28 CAPSULE, EXTENDED RELEASE ORAL at 08:58

## 2021-07-15 RX ADMIN — PIPERACILLIN SODIUM AND TAZOBACTAM SODIUM 2.25 G: 2; .25 INJECTION, POWDER, LYOPHILIZED, FOR SOLUTION INTRAVENOUS at 09:29

## 2021-07-15 RX ADMIN — FLUOCINOLONE ACETONIDE: 0.25 OINTMENT TOPICAL at 22:32

## 2021-07-15 RX ADMIN — AZITHROMYCIN MONOHYDRATE 250 MG: 500 INJECTION, POWDER, LYOPHILIZED, FOR SOLUTION INTRAVENOUS at 10:42

## 2021-07-15 RX ADMIN — PIPERACILLIN SODIUM AND TAZOBACTAM SODIUM 2.25 G: 2; .25 INJECTION, POWDER, LYOPHILIZED, FOR SOLUTION INTRAVENOUS at 22:31

## 2021-07-15 RX ADMIN — CYCLOBENZAPRINE 10 MG: 5 TABLET, FILM COATED ORAL at 08:58

## 2021-07-15 RX ADMIN — CITALOPRAM HYDROBROMIDE 20 MG: 20 TABLET ORAL at 08:59

## 2021-07-15 RX ADMIN — PIPERACILLIN SODIUM AND TAZOBACTAM SODIUM 2.25 G: 2; .25 INJECTION, POWDER, LYOPHILIZED, FOR SOLUTION INTRAVENOUS at 16:01

## 2021-07-15 RX ADMIN — METOPROLOL SUCCINATE 25 MG: 25 TABLET, EXTENDED RELEASE ORAL at 08:58

## 2021-07-15 RX ADMIN — SIMVASTATIN 40 MG: 20 TABLET, FILM COATED ORAL at 22:31

## 2021-07-15 RX ADMIN — PIPERACILLIN SODIUM AND TAZOBACTAM SODIUM 3.38 G: 3; .375 INJECTION, POWDER, LYOPHILIZED, FOR SOLUTION INTRAVENOUS at 01:57

## 2021-07-15 RX ADMIN — LEVOTHYROXINE SODIUM 88 MCG: 88 TABLET ORAL at 08:58

## 2021-07-15 RX ADMIN — INSULIN ASPART 1 UNITS: 100 INJECTION, SOLUTION INTRAVENOUS; SUBCUTANEOUS at 09:10

## 2021-07-15 RX ADMIN — GABAPENTIN 600 MG: 300 CAPSULE ORAL at 22:31

## 2021-07-15 RX ADMIN — INSULIN ASPART 1 UNITS: 100 INJECTION, SOLUTION INTRAVENOUS; SUBCUTANEOUS at 17:28

## 2021-07-15 ASSESSMENT — ACTIVITIES OF DAILY LIVING (ADL)
ADLS_ACUITY_SCORE: 29
ADLS_ACUITY_SCORE: 29
ADLS_ACUITY_SCORE: 31
ADLS_ACUITY_SCORE: 28
ADLS_ACUITY_SCORE: 29
ADLS_ACUITY_SCORE: 29

## 2021-07-15 NOTE — PROGRESS NOTES
Hospitalist brief update note:    Patient has been assessed for Home Oxygen needs. Oxygen readings:    *Pulse oximetry (SpO2) = 86% on room air at rest while awake.    *SpO2 improved to 95% on 1liters/minute at rest.      Patient is bed bound and so following does not apply:    *SpO2 = NA % on room air during activity/with exercise.    *SpO2 improved to NA% on NA liters/minute during activity/with exercise.    Davy Uriostegui MD  Hospitalist

## 2021-07-15 NOTE — PLAN OF CARE
Physical Therapy Discharge Summary    Reason for therapy discharge:    Discharged to home with home therapy.    Progress towards therapy goal(s). See goals on Care Plan in [facility] electronic health record for goal details.  Goals not met.  Barriers to achieving goals:   discharge from facility.    Therapy recommendation(s):    Continued therapy is recommended.  Rationale/Recommendations:  TCU was recommended but refused by pt's family. Will need home PT and use of lift for safest transfers.

## 2021-07-15 NOTE — PROGRESS NOTES
Hospitalist brief update note    I certify that this patient, Nhi Perry has been under my care (or a nurse practitioner or physican's assistant working with me). This is the face-to-face encounter for oxygen medical necessity.      Nhi Perry is now in a chronic stable state and continues to require supplemental oxygen. Patient has continued oxygen desaturation due to COVID-19 pneumonia/secondary bacterial pneumonia.    Alternative treatment(s) tried or considered and deemed clinically infective for treatment of  COVID-19 pneumonia/secondary bacterial pneumonia include nebulizers, pulmonary toileting and Antibiotic and steroid.  If portability is ordered, is the patient mobile within the home? no    **Patients who qualify for home O2 coverage under the CMS guidelines require ABG tests or O2 sat readings obtained closest to, but no earlier than 2 days prior to the discharge, as evidence of the need for home oxygen therapy. Testing must be performed while patient is in the chronic stable state. See notes for O2 sats.**    Davy Uriostegui MD  Hospitalist

## 2021-07-15 NOTE — PLAN OF CARE
"07/15/2021 night shift  Cognitive Concerns/ Orientation : A&Ox2; disoriented to time & situation. [place] speaking, Sabra utilized.  BEHAVIOR & AGGRESSION TOOL COLOR: Green  ABNL VS/O2: VSS on 1L of oxygen via NC. Tried weaning pt off oxygen this morning; pt desat to 88% on RA.   MOBILITY: T/R q2hr. Lift for transfers  PAIN MANAGMENT: Denies pain.  DIET: Mod CHO, assist with feeding.   BOWEL/BLADDER: Hawley in place; patent. 2 BMs this shift, incontinent. No Abdominal pain or nausea.   ABNL LAB/BG:   DRAIN/DEVICES: PIV, saline locked, intermittent abx.   TELEMETRY RHYTHM: SR w/1st degree AVB and PACs  SKIN: Rash/redness to abdominal and breast folds, powder applied. Mepilex on coccyx CDI. Jonathan LE mehran/pale.   TESTS/PROCEDURES: n/a  D/C DAY/GOALS/PLACE: Pending, Recommendation is to TCU, but family prefers to take pt home. ID is following.   OTHER IMPORTANT INFO: Pt is asking about going home today; \"I just need lift to get into my chair\".   "

## 2021-07-15 NOTE — PLAN OF CARE
07/14/2021 1900-2300  Cognitive Concerns/ Orientation : A&Ox2; disoriented to time & situation. [place]ese speaking, Sabra utilized.  BEHAVIOR & AGGRESSION TOOL COLOR: Green  ABNL VS/O2: VSS, using 1L/NC O2 sats mid to upper 90's.  MOBILITY: Total cares, assist of 2 to turn/repo q2hr. Lift for transfers. Unable to stand with PT.   PAIN MANAGMENT: Denies pain.  DIET: Mod CHO, assist with feeding.   BOWEL/BLADDER: Hawley placed earlier today, patent, WDL. Incontinent of large BM this shift.   ABNL LAB/BG: no new labs today.   DRAIN/DEVICES: PIV, saline locked, intermittent abx.   TELEMETRY RHYTHM: SR w/PACs  SKIN: Rash/redness to abdominal and breast folds, powder applied. Mepilex on coccyx CDI. Jonathan LE mehran/pale.   TESTS/PROCEDURES: n/a  D/C DAY/GOALS/PLACE: Pending improvement. Recommendation is to TCU, but family prefers to take pt home.   OTHER IMPORTANT INFO: LS fine crackles/diminished, infrequent nonproductive cough.

## 2021-07-15 NOTE — PROGRESS NOTES
Care Management Follow Up    Length of Stay (days): 4    Expected Discharge Date: 07/15/2021     Concerns to be Addressed: discharge planning     Patient plan of care discussed at interdisciplinary rounds: Yes    Anticipated Discharge Disposition: Home Care     Anticipated Discharge Services:    Anticipated Discharge DME:      Patient/family educated on Medicare website which has current facility and service quality ratings:    Education Provided on the Discharge Plan:    Patient/Family in Agreement with the Plan: yes    Referrals Placed by CM/SW: Homecare, Post Acute Facilities  Private pay costs discussed: Not applicable    Additional Information:  Discussed TCU with pt's daughter and granddaughter Edith, per request. Pt currently requiring lift for tranfers and pt's daugter is also sick. Only limited TCU's in the [facility] taking Covid positive patients at this time. Family prefers [facility], but this facility is not taking Covid patients. [facility] can accept pt. SW explained this to dtr and Edith. They decline this option and will instead take pt home. SW explained pt will need two people for transfers and they expressed an understanding. Pt will need transport home. [facility] BLS set for [facility]. PCS faxed. RN CC to arrange home are and update family.      RUBY Hooks, SW  [phone]  [facility]    Addendum: Pt not discharging today. Ride canceled. Rescheduled for 1300, Friday.

## 2021-07-15 NOTE — PLAN OF CARE
Cognitive Concerns/ Orientation : A&Ox2; disoriented to time & situation. Citizen of the [place] speaking, Jabber utilized.  BEHAVIOR & AGGRESSION TOOL COLOR: Green  ABNL VS/O2: On room air at start of shift, O2 sats decreased to 87%, placed back on 2L O2 NC, sats maintained low 90s. Other VSS.   MOBILITY: Total cares, assist of 2 to turn/repo q2hr. Lift for transfers. Unable to stand with PT.   PAIN MANAGMENT: Denies pain.  DIET: Mod CHO, assist with feeding.   BOWEL/BLADDER: Hawley placed earlier today, patent, WDL. Incontinent of large BM this afternoon.   ABNL LAB/BG: no new labs today. BG   DRAIN/DEVICES: PIV, saline locked, intermittent abx.   TELEMETRY RHYTHM: SR w/1st degree AVB and PACs  SKIN: Rash/redness to abdominal and breast folds, powder applied. Mepilex on coccyx CDI. Jonathan LE mehran/pale.   TESTS/PROCEDURES: n/a  D/C DAY/GOALS/PLACE: Pending improvement. Recommendation is to TCU, but family prefers to take pt home.   OTHER IMPORTANT INFO: LS fine crackles/diminished, infrequent nonproductive cough.

## 2021-07-15 NOTE — DISCHARGE SUMMARY
[facility] [place]  Hospitalist Discharge Summary      Date of Admission:  7/11/2021  Date of Discharge:  7/16/2021  Discharging Provider: Davy Uriostegui MD      Discharge Diagnoses     Suspected bilateral pneumonia with acute hypoxia    COVID 19 infection 6/22    See below for additional medical problems    Follow-ups Needed After Discharge   Follow-up Appointments     Follow-up and recommended labs and tests       Follow up with primary care provider, Fabio Natarajan, within 7 days to   evaluate medication change and for hospital follow- up.             Unresulted Labs Ordered in the Past 30 Days of this Admission     Date and Time Order Name Status Description    7/11/2021  7:39 PM Blood Culture Hand, Right Preliminary     7/11/2021  7:39 PM Blood Culture Peripheral Blood Preliminary       These results will be followed up by hospitalist/PCP    Discharge Disposition   Discharged to home  Condition at discharge: Stable    Hospital Course               Nhi Perry is a markedly pleasant 89 year old woman with past medical history that is most notable for Type 2 Diabetes mellitus, remote history of pulmonary TB, and recently treated bilateral COVID pneumonia, among others; who presents with ongoing cough and is found to have suspected bilateral pneumonia.        Suspected bilateral pneumonia  Recent COVID 6/22  Of note, she tested positive for COVID on 6/22, and multiple other family members have either had or still have it. She herself went on to develop bilateral pneumonia causing hypoxia and was hospitalized at [facility] from 7/1 to 7/5/2021. There CTPA on 7/1 showed old granulomas and upper lobe pleural plaques. It seems she was treated for pulmonary TB in the [place] in the 1950's. In any event she was stabilized and weaned off oxygen and has now completed a course of Remdesivir and steroids. However she presents now for ongoing cough and worsening weakness. She has leukocytosis.  CXR shows bilateral opacifications and upper lobe pleural plaques, seen on previous studies and possibly limiting the diagnotsic yield of the x-ray. She could have post-viral bacterial pneumonia, possibly symptoms related to ongoing COVID or post-COVID symptoms. Reactivated TB could be possible.  - COVID precautions continued for now, discussed with ID, given ongoing respiratory symptoms and hypoxia, continue isolation for a week and then discontinue unless continues to have respiratory symptoms.  - Rivaroxaban continued for VTE chemoprophylaxis during hospital stay and at discharge as per protocol.   -Continued with azithro and Zosyn while in hospital, prescribed a week of augmentin at discharge   - follow up cultures- NGTD and Pro-calcitonin negative <0.05.   - CTPA repeated for further evaluation - no PE, worsened bilateral infiltrates noted  - appears nontoxic today, afebrile. ID consult appreciated.    - wean O2 as able, currently needing 1 LPM, home O2 has been arranged     Recent acute metabolic encephalopathy  Noted at [facility] this month. She could have been sundowning in the setting of possible undiagnosed cognitive deficits. We should monitor for that happening again while she is here.  - appears stable and oriented     CKD III  Possible NILES appears resolved/improved  She has history reportedly of CKD Stage 3. Cr 1.20 and was 0.85 on 7/5/21. Could be hypovolemic. Mild.  - For now, encourage fluid intake        Type 2 Diabetes mellitus  Most recent A1c 6.8 in 7/2021. On Metformin as an outpatient.  - ISS insulin while hospitalized.   -PTA metformin held while in hospital and resuming at discharge.     Bigeminy  Her daughter reported low heart rate when checked at home today and on the monitor she has bradycardia but EKG at the same time show bigeminy with rate 72, likely leading to error in monitor readings.      Diarrhea 7/15  Without abd pain, nausea, fever or leucocytosis.   -monitor. Likely antibiotic  associated.   Has resolved today     Chronic anemia: HGB 11.1 and was 10.8 on 7/5/2021.     Hypothyroid: Resumed Synthroid  Dyslipidemia: Resumed statin   Hypertension: Controlled with lisinopril, resumed after Cr improved     Consultations This Hospital Stay   INFECTIOUS DISEASES IP CONSULT  CARE MANAGEMENT / SOCIAL WORK IP CONSULT  PHYSICAL THERAPY ADULT IP CONSULT    Code Status   Full Code    Time Spent on this Encounter   IDavy MD, personally saw the patient today and spent greater than 30 minutes discharging this patient.       Davy Uriostegui MD  [place] [address] [facility]  [address] [place] [address]  Phone: [phone]  ______________________________________________________________________    Physical Exam   Vital Signs: Temp: 97.6  F (36.4  C) Temp src: Oral BP: 111/54 Pulse: 69   Resp: 18 SpO2: 95 % O2 Device: Nasal cannula Oxygen Delivery: 1 LPM  Weight: 149 lbs 14.6 oz    Gen: NAD, pleasant, elderly, [place] speaking - Jabber used on ipad  HEENT: Normocephalic, EOMI, MMM  Resp: no focal crackles,  no wheezes, no increased work of resp  CV: S1S2 heard, reg rhythm, reg rate, no pedal edema  Abdo: soft, nontender, nondistended, bowel sounds present  Ext: calves nontender, well perfused  Neuro: AAOx self and hospital, CN grossly intact, no facial asymmetry          Primary Care Physician   Fabio Natarajan    Discharge Orders      COVID-19 GetWell Loop Referral      Reason for your hospital stay    Post COVID Pneumonia     Follow-up and recommended labs and tests     Follow up with primary care provider, Fabio Natarajan, within 7 days to evaluate medication change and for hospital follow- up.     Contact provider    Contact your primary care provider if If increased trouble breathing, new arm/leg swelling, dizziness/passing out, falls, bleeding that doesn't stop, or uncontrolled pain.     Discharge - Quarantine/Isolation Instruction    Date of symptom  onset:     Date of first positive test:    If you tested positive COVID-19 and show symptoms (fever, cough, body aches or trouble breathing):        Stay home and away from others (self-isolate) until:        At least 10 days have passed since your symptoms started. AND...        You've had no fever-and no medicine that reduces fever for 1 full day (24 hours). AND...         Your other symptoms have resolved (gotten better).  If you tested positive for COVID-19 but don't show symptoms:       Stay home and away from others (self-isolate) until at least 10 days have passed since the date of your first positive COVID-19 test.     When to contact your care team    Call your primary doctor if you have any of the following: Fever, increased shortness of breath, worsened diarrhea/abd pain/vomiting.     Activity    Your activity upon discharge: activity as tolerated     Diet    Follow this diet upon discharge: Orders Placed This Encounter      Combination Diet Regular Diet Adult; Consistent Carb 60 Grams CHO per Meal Diet       Significant Results and Procedures   Most Recent 3 CBC's:Recent Labs   Lab Test 07/15/21  1048 07/12/21  1012 07/11/21 1958   WBC 7.1 7.6 12.1*   HGB 9.5* 10.8* 11.1*   MCV 95 94 93    197 258     Most Recent 3 BMP's:Recent Labs   Lab Test 07/15/21  1154 07/15/21  0801 07/15/21  0146 07/12/21  1012 07/11/21 1958 07/05/21  0643   NA  --   --   --  136 135 135   POTASSIUM  --   --   --  4.3 4.4 4.7   CHLORIDE  --   --   --  109 101 106   CO2  --   --   --  19* 23 24   BUN  --   --   --  42* 48* 32*   CR  --   --   --  1.06* 1.20* 0.85   ANIONGAP  --   --   --  8 11 5   CHRIS  --   --   --  8.0* 8.8 8.8   * 176* 188* 110* 274* 168*     Most Recent 2 LFT's:Recent Labs   Lab Test 07/12/21  1012 07/01/21  1844   AST 34 30   ALT 47 57*   ALKPHOS 69 94   BILITOTAL 0.5 0.2     Most Recent 3 INR's:Recent Labs   Lab Test 07/01/21  1844   INR 1.14     Most Recent 3 Troponin's:Recent Labs   Lab Test  07/11/21 1958 07/01/21  1844 07/27/14  1608   TROPI  --  <0.015 <0.012   TROPONIN <0.015  --   --      Most Recent 3 BNP's:Recent Labs   Lab Test 07/01/21  1242 06/22/21  1507   NTBNPI 315 678     Most Recent D-dimer:Recent Labs   Lab Test 07/11/21 1958   DD 0.61*     Most Recent Cholesterol Panel:No lab results found.  Most Recent 6 Bacteria Isolates From Any Culture (See EPIC Reports for Culture Details):No lab results found.  Most Recent TSH and T4:Recent Labs   Lab Test 07/01/21  1242 07/27/14  1608   TSH 1.08 10.40*   T4  --  0.73     Most Recent 6 glucoses:Recent Labs   Lab Test 07/15/21  1154 07/15/21  0801 07/15/21  0146 07/14/21  2132 07/14/21  1910 07/14/21  1352   * 176* 188* 259* 180* 176*     Most Recent Urinalysis:Recent Labs   Lab Test 07/12/21  0846   COLOR Light Yellow   APPEARANCE Clear   URINEGLC Negative   URINEBILI Negative   URINEKETONE Negative   SG 1.024   UBLD Negative   URINEPH 6.0   PROTEIN Negative   NITRITE Negative   LEUKEST Large*   RBCU 8*   WBCU 1   ,   Results for orders placed or performed during the hospital encounter of 07/11/21   XR Chest Port 1 View    Narrative    CHEST PORTABLE ONE VIEW   7/11/2021 8:17 PM     HISTORY: Cough, COVID.    COMPARISON: Chest x-rays and CT dated 7/1/2021.      Impression    IMPRESSION: Extensive pleural plaquing bilateral lungs, worse on the  left is again noted. Hazy infiltrates in the bilateral lungs are again  seen and may be very slightly worsened as compared to the prior study  dated 7/1/2021. Additional subtle opacity in the right lateral  costophrenic angle could represent worsening infiltrate in that  location. No pneumothorax or significant pleural fluid collection is  identified. No other significant changes. Heart size remains stable.  Thoracic aortic calcifications are again noted.    SYDNIE JIMÉNEZ MD         SYSTEM ID:  [id number]   CT Chest Pulmonary Embolism w Contrast    Narrative    CT CHEST PULMONARY EMBOLISM WITH CONTRAST  July 12, 2021 12:16 PM    CLINICAL HISTORY: Chest Pain. Pulmonary embolus suspected, high  probability.    TECHNIQUE: CT angiogram chest during arterial phase injection IV  contrast. 2D and 3D MIP reconstructions were performed by the CT  technologist. Dose reduction techniques were used.   CONTRAST: 59mL Isovue-370.    COMPARISON: July 1, 2021.    FINDINGS:  ANGIOGRAM CHEST: Pulmonary arteries are normal caliber and negative  for pulmonary emboli. Thoracic aorta is negative for dissection. No CT  evidence of right heart strain.    LUNGS AND PLEURA: Moderately extensive interstitial and airspace  infiltrates bilaterally. Extensive calcified pleural plaques which are  nonspecific but likely related to asbestos exposure.    MEDIASTINUM/AXILLAE: No adenopathy or aneurysm.    UPPER ABDOMEN: No acute findings.    MUSCULOSKELETAL: No frankly destructive bony lesions.      Impression    IMPRESSION:  1.  No pulmonary embolism demonstrated.  2.  Moderately extensive interstitial and airspace infiltrates  bilaterally compatible with pneumonia. Infiltrates are markedly  increased from previous.  3.  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    EVITA GARCIA MD         SYSTEM ID:  [id number]       Discharge Medications   Current Discharge Medication List      START taking these medications    Details   amoxicillin-clavulanate (AUGMENTIN) 875-125 MG tablet Take 1 tablet by mouth 2 times daily for 7 days  Qty: 14 tablet, Refills: 0    Associated Diagnoses: Bacterial pneumonia         CONTINUE these medications which have NOT CHANGED    Details   acetaminophen (TYLENOL) 325 MG tablet Take 650 mg by mouth every 8 hours as needed for pain       albuterol (PROAIR HFA/PROVENTIL HFA/VENTOLIN HFA) 108 (90 Base) MCG/ACT inhaler Inhale 2 puffs into the lungs every 6 hours as needed for shortness of breath / dyspnea or wheezing  Qty: 6.7 g, Refills: 0      bisacodyl (DULCOLAX) 10 MG suppository Place 1  suppository rectally daily as needed.  Qty: 12 suppository, Refills: 3    Associated Diagnoses: Constipation      celecoxib (CELEBREX) 200 MG capsule Take 200 mg by mouth daily as needed for moderate pain      Cetirizine HCl (ZYRTEC ALLERGY PO) Take 10 mg by mouth daily as needed.      citalopram (CELEXA) 20 MG tablet Take 20 mg by mouth daily      cyclobenzaprine (FLEXERIL) 10 MG tablet Take 10 mg by mouth daily      dexamethasone (DECADRON) 6 MG tablet Take 1 tablet (6 mg) by mouth daily for 5 days  Qty: 5 tablet, Refills: 0    Comments: Future refills by PCP  Physician No Ref-Primary with phone number None.  Associated Diagnoses: Pneumonia due to 2019 novel coronavirus      donepezil (ARICEPT) 10 MG tablet Take 10 mg by mouth At Bedtime      fluocinolone (SYNALAR) 0.025 % cream Apply topically 2 times daily      fluticasone (FLONASE) 50 MCG/ACT nasal spray Spray 2 sprays into both nostrils daily as needed for rhinitis or allergies      fluticasone (FLOVENT HFA) 220 MCG/ACT inhaler Inhale 2 puffs into the lungs 2 times daily      gabapentin (NEURONTIN) 300 MG capsule Take 600 mg by mouth 3 times daily      guaiFENesin-dextromethorphan (ROBITUSSIN DM) 100-10 MG/5ML syrup Take 10 mLs by mouth 4 times daily as needed for cough  Qty: 236 mL, Refills: 0    Comments: Future refills by PCP  Physician No Ref-Primary with phone number None.  Associated Diagnoses: Pneumonia due to 2019 novel coronavirus      levothyroxine (SYNTHROID/LEVOTHROID) 88 MCG tablet Take 88 mcg by mouth daily      lisinopril (ZESTRIL) 40 MG tablet Take 40 mg by mouth daily      memantine XR (NAMENDA XR) 28 MG 24 hr capsule Take 28 mg by mouth daily      metFORMIN (GLUCOPHAGE) 500 MG tablet Take 1 tablet by mouth 2 times daily (with meals).  Qty: 180 tablet, Refills: 4    Associated Diagnoses: Type 2 diabetes, HbA1c goal < 7% (H)      metoprolol succinate ER (TOPROL-XL) 25 MG 24 hr tablet Take 25 mg by mouth daily      mometasone furoate  (ASMANEX HFA) 200 MCG/ACT inhaler Inhale 2 puffs into the lungs 2 times daily      Olopatadine HCl (PATANOL OP) Place 1 drop into both eyes as needed      rivaroxaban ANTICOAGULANT (XARELTO) 10 MG TABS tablet Take 1 tablet (10 mg) by mouth daily (with dinner)  Qty: 30 tablet, Refills: 0    Associated Diagnoses: Pneumonia due to 2019 novel coronavirus      senna-docusate (SENOKOT-S/PERICOLACE) 8.6-50 MG tablet Take 2 tablets by mouth 2 times daily as needed for constipation      simvastatin (ZOCOR) 40 MG tablet Take 40 mg by mouth daily       zolpidem (AMBIEN) 5 MG tablet Take 5 mg by mouth nightly as needed for sleep         STOP taking these medications       HYDROcodone-acetaminophen (NORCO) 5-325 MG tablet Comments:   Reason for Stopping:             Allergies   Allergies   Allergen Reactions     Seasonal Allergies

## 2021-07-15 NOTE — PROGRESS NOTES
[facility]    Medicine Progress Note - Hospitalist Service       Date of Admission:  7/11/2021    Assessment & Plan            Nhi Perry is a markedly pleasant 89 year old woman with past medical history that is most notable for Type 2 Diabetes mellitus, remote history of pulmonary TB, and recently treated bilateral COVID pneumonia, among others; who presents with ongoing cough and is found to have suspected bilateral pneumonia.        Suspected bilateral pneumonia  Recent COVID 6/22  Of note, she tested positive for COVID on 6/22, and multiple other family members have either had or still have it. She herself went on to develop bilateral pneumonia causing hypoxia and was hospitalized at [facility] from 7/1 to 7/5/2021. There CTPA on 7/1 showed old granulomas and upper lobe pleural plaques. It seems she was treated for pulmonary TB in the [place] in the 1950's. In any event she was stabilized and weaned off oxygen and has now completed a course of Remdesivir and steroids. However she presents now for ongoing cough and worsening weakness. She has leukocytosis. CXR shows bilateral opacifications and upper lobe pleural plaques, seen on previous studies and possibly limiting the diagnotsic yield of the x-ray. She could have post-viral bacterial pneumonia, possibly symptoms related to ongoing COVID or post-COVID symptoms. Reactivated TB could be possible.  - COVID precautions continued for now, discussed with ID, given ongoing respiratory symptoms and hypoxia, for a week and re assess   - Rivaroxaban continued for VTE chemoprophylaxis.   - continuing with azithro and Zosyn, change to augmentin at discharge for a week.   - follow up cultures- NGTD and Pro-calcitonin negative <0.05.   - CTPA repeated for further evaluation - no PE, worsened bilateral infiltrates noted  - appears nontoxic today, afebrile. ID following and appreciated.    - wean O2 as able, currently needing 1 LPM,  likely needs home  O2     Recent acute metabolic encephalopathy  Noted at [facility] this month. She could have been sundowning in the setting of possible undiagnosed cognitive deficits. We should monitor for that happening again while she is here.  - appears stable and oriented     CKD III  Possible NILES appears resolved/improved  She has history reportedly of CKD Stage 3. Cr 1.20 and was 0.85 on 7/5/21. Could be hypovolemic. Mild.  - For now, encourage fluid intake        Type 2 Diabetes mellitus  Most recent A1c 6.8 in 7/2021. On Metformin as an outpatient.  - ISS insulin while hospitalized.   - Hold metformin     Bigeminy  Her daughter reported low heart rate when checked at home today and on the monitor she has bradycardia but EKG at the same time show bigeminy with rate 72, likely leading to error in monitor readings.     Diarrhea  Without abd pain, nausea, fever or leucocytosis.   -monitor. Likely antibiotic associated.      Chronic anemia: HGB 11.1 and was 10.8 on 7/5/2021.     Hypothyroid: Resumed Synthroid  Dyslipidemia: Resumed statin   Hypertension: Controlled with lisinopril, resumed after Cr improved.     Diet: Combination Diet Regular Diet Adult; Consistent Carb 60 Grams CHO per Meal Diet  Diet    DVT Prophylaxis: xarelto  Hawley Catheter: PRESENT, indication: Retention  Central Lines: None  Code Status: Full Code      Disposition Plan   Expected discharge: stable for discharge, home O2 arranged, discussed with daughter at length, she is making arrangements at home to care for patient, and accept patient tomorrow only. D/w CC/SW     The patient's care was discussed with the Bedside Nurse, Patient and Patient's Family.    Davy Uriostegui MD  Hospitalist Service  [facility]  Securely message with the Vocera Web Console (learn more here)  Text page via [employer] Paging/Directory      Risk Factors Present on Admission                 ______________________________________________________________________    Interval History     Care resumed, chart reviewed, evaluated patient- communication with the help of  (rico).     Intermittent cough but denies dyspnea. Mild hypoxia needing 1LPM NC O2. No fever.   Diarrhea reported per RN, patient without fever, leucocytosis or abdominal pain.      Data reviewed today: I reviewed all medications, new labs and imaging results over the last 24 hours. I personally reviewed no images or EKG's today.    Physical Exam   Vital Signs: Temp: 97.6  F (36.4  C) Temp src: Oral BP: 111/54 Pulse: 69   Resp: 18 SpO2: 95 % O2 Device: Nasal cannula Oxygen Delivery: 1 LPM  Weight: 149 lbs 14.6 oz    Gen: NAD, pleasant, elderly, [place] speaking - Jabber used on ipad  HEENT: Normocephalic, EOMI, MMM  Resp: no focal crackles,  no wheezes, no increased work of resp  CV: S1S2 heard, reg rhythm, reg rate, no pedal edema  Abdo: soft, nontender, nondistended, bowel sounds present  Ext: calves nontender, well perfused  Neuro: AAOx self and hospital, CN grossly intact, no facial asymmetry      Data   Recent Labs   Lab 07/15/21  1154 07/15/21  1048 07/15/21  0801 07/15/21  0146 07/12/21  1012 07/11/21  1958   WBC  --  7.1  --   --  7.6 12.1*   HGB  --  9.5*  --   --  10.8* 11.1*   MCV  --  95  --   --  94 93   PLT  --  174  --   --  197 258   NA  --   --   --   --  136 135   POTASSIUM  --   --   --   --  4.3 4.4   CHLORIDE  --   --   --   --  109 101   CO2  --   --   --   --  19* 23   BUN  --   --   --   --  42* 48*   CR  --   --   --   --  1.06* 1.20*   ANIONGAP  --   --   --   --  8 11   CHRIS  --   --   --   --  8.0* 8.8   *  --  176* 188* 110* 274*   ALBUMIN  --   --   --   --  2.0*  --    PROTTOTAL  --   --   --   --  5.9*  --    BILITOTAL  --   --   --   --  0.5  --    ALKPHOS  --   --   --   --  69  --    ALT  --   --   --   --  47  --    AST  --   --   --   --  34  --    TROPONIN  --   --   --   --   --   <0.015     No results found for this or any previous visit (from the past 24 hour(s)).

## 2021-07-15 NOTE — PROGRESS NOTES
[facility]  Infectious Disease Progress Note          Assessment and Plan:   IMPRESSION:    1.  A 89-year-old female, recent COVID-19 that was symptomatic, now with recurrent worsening respiratory symptoms, second flare up since the diagnosis, likely COVID related damage but concern for secondary bacterial pneumonia.  2.  Recent COVID, likely COVID recovered at this point, but given the active respiratory symptoms, hard to exclude still contagious.  3.  [place] immigrant, prior history of tuberculosis and imaging compatible with old tuberculosis.  4.  Current CT scan with multiple changes, possible old asbestos, changes of old tuberculosis, some bilateral infiltrates that are worse, does not look like tuberculosis.  5.  Diabetes mellitus.     RECOMMENDATIONS:    1.  Continue COVID precautions for now given the active respiratory symptoms, but probably is COVID recovered.  Certainly no specific treatment for COVID indicated at this point.  2.  Conventional antibiotics as we are doing, is down to 1 liter of oxygen.  Does not look toxic or ill.  Possibility of conventional pneumonia vs viral vs commonly seen late COVID lung damage worsening,  no fever,+ micro and procal low   3.  From a TB standpoint, no particular testing at this point, not really producing sputum.  I do not think she needs a bronchoscopy.  If she clinically improves with conventional antibiotics or time, would not pursue this further.  4 OKcomplete zpak  Course and 1 week augmentin po as soon as any time and disposition        Interval History:   no new complaints and doing well; no cp, sob, n/v/d, or abd pain O2 stable.              Medications:       azithromycin  250 mg Intravenous Q24H     citalopram  20 mg Oral Daily     cyclobenzaprine  10 mg Oral Daily     donepezil  10 mg Oral At Bedtime     fluocinolone   Topical BID     fluticasone  1 puff Inhalation Daily     gabapentin  600 mg Oral TID     insulin aspart  1-7 Units  Subcutaneous TID AC     insulin aspart  1-5 Units Subcutaneous At Bedtime     levothyroxine  88 mcg Oral Daily     lisinopril  40 mg Oral Daily     memantine XR  28 mg Oral Daily     metoprolol succinate ER  25 mg Oral Daily     piperacillin-tazobactam  3.375 g Intravenous Q6H     rivaroxaban ANTICOAGULANT  10 mg Oral Daily with supper     simvastatin  40 mg Oral At Bedtime     sodium chloride (PF)  3 mL Intracatheter Q8H                  Physical Exam:   Blood pressure 111/54, pulse 69, temperature 97.6  F (36.4  C), temperature source Oral, resp. rate 18, weight 68 kg (149 lb 14.6 oz), SpO2 95 %.  Wt Readings from Last 2 Encounters:   07/15/21 68 kg (149 lb 14.6 oz)   07/29/14 84.1 kg (185 lb 4.8 oz)     Vital Signs with Ranges  Temp:  [97.4  F (36.3  C)-97.7  F (36.5  C)] 97.6  F (36.4  C)  Pulse:  [68-80] 69  Resp:  [16-18] 18  BP: ()/(51-57) 111/54  SpO2:  [86 %-99 %] 95 %    Constitutional: Awake, alert, cooperative, no apparent distress looks OK   Lungs: Clear to auscultation bilaterally, no crackles or wheezing   Cardiovascular: Regular rate and rhythm, normal S1 and S2, and no murmur noted   Abdomen: Normal bowel sounds, soft, non-distended, non-tender   Skin: No rashes, no cyanosis, no edema   Other:           Data:   All microbiology laboratory data reviewed.  Recent Labs   Lab Test 07/12/21 1012 07/11/21 1958 07/05/21  0643   WBC 7.6 12.1* 7.8   HGB 10.8* 11.1* 10.8*   HCT 34.4* 34.1* 32.6*   MCV 94 93 92    258 272     Recent Labs   Lab Test 07/12/21 1012 07/11/21 1958 07/05/21  0643   CR 1.06* 1.20* 0.85     No lab results found.  No lab results found.    Invalid input(s): ANABEL

## 2021-07-15 NOTE — PROVIDER NOTIFICATION
MD Notification    Notified Person: MD    Notified Person Name:    Notification Date/Time: 7/15/21 1212    Notification Interaction: Patient with frequent loose stools, do you want a  sample? Thanks!    Purpose of Notification:    Orders Received: Does not meet criteria for testing for CDIFF. No orders placed.    Comments:

## 2021-07-15 NOTE — PROGRESS NOTES
Patient has been assessed for Home Oxygen needs. Oxygen readings:    *Pulse oximetry (SpO2) = 91 % on room air at rest while awake.    *SpO2 improved to  96 % on 2 liters/minute at rest.    *SpO2 = 88 % on room air during activity/with just movement   *SpO2 improved to 96 % on 2liters/minute during activity/with exercise.

## 2021-07-15 NOTE — PLAN OF CARE
SUMMARY: Pneumonia (COVID+ - hx of TB)  DATE/TIME: 7/15/21 7632-3021  Cognitive Concerns/ Orientation : A&Ox2; disoriented to time & situation. Israeli speaking, Jabber utilized.  BEHAVIOR & AGGRESSION TOOL COLOR: Green  ABNL VS/O2: VSS on RA. Desats with activity. 97% at rest.  MOBILITY: Total, T/R q2hr. Lift for transfers. Up in chair x1.  PAIN MANAGMENT: Denies  DIET: Mod CHO, assist with feeding  BOWEL/BLADDER: Hawley in place; patent. 2 soft/watery BMs this shift, incontinent.   ABNL LAB/BG: B, 187 (sliding scale insulin), HgB: 9.5 (trending down, no active signs of bleeding)  DRAIN/DEVICES: L PIV SL with int abx  TELEMETRY RHYTHM: NSR  SKIN: Rash/redness to abdominal and breast folds, powder applied. Mepilex on coccyx CDI. Jonathan LE mehran/pale.   TESTS/PROCEDURES: N/A  D/C DAY/GOALS/PLACE: To home tomorrow at 1:00pm, ride set up.  OTHER IMPORTANT INFO: Keep legs elevated- blood pooled in LE when in chair.

## 2021-07-16 ENCOUNTER — DOCUMENTATION ONLY (OUTPATIENT)
Dept: MEDSURG UNIT | Facility: CLINIC | Age: 86
End: 2021-07-16

## 2021-07-16 VITALS
TEMPERATURE: 97.2 F | DIASTOLIC BLOOD PRESSURE: 59 MMHG | SYSTOLIC BLOOD PRESSURE: 128 MMHG | WEIGHT: 149.91 LBS | RESPIRATION RATE: 18 BRPM | HEART RATE: 72 BPM | BODY MASS INDEX: 29.43 KG/M2 | OXYGEN SATURATION: 95 % | HEIGHT: 60 IN

## 2021-07-16 LAB
BACTERIA BLD CULT: NO GROWTH
GLUCOSE BLDC GLUCOMTR-MCNC: 143 MG/DL (ref 70–99)
GLUCOSE BLDC GLUCOMTR-MCNC: 153 MG/DL (ref 70–99)
GLUCOSE BLDC GLUCOMTR-MCNC: 191 MG/DL (ref 70–99)

## 2021-07-16 PROCEDURE — 99239 HOSP IP/OBS DSCHRG MGMT >30: CPT | Performed by: HOSPITALIST

## 2021-07-16 PROCEDURE — 250N000011 HC RX IP 250 OP 636: Performed by: HOSPITALIST

## 2021-07-16 PROCEDURE — 250N000013 HC RX MED GY IP 250 OP 250 PS 637: Performed by: HOSPITALIST

## 2021-07-16 PROCEDURE — 258N000003 HC RX IP 258 OP 636: Performed by: HOSPITALIST

## 2021-07-16 RX ADMIN — CITALOPRAM HYDROBROMIDE 20 MG: 20 TABLET ORAL at 08:45

## 2021-07-16 RX ADMIN — FLUOCINOLONE ACETONIDE: 0.25 OINTMENT TOPICAL at 08:50

## 2021-07-16 RX ADMIN — PIPERACILLIN SODIUM AND TAZOBACTAM SODIUM 2.25 G: 2; .25 INJECTION, POWDER, LYOPHILIZED, FOR SOLUTION INTRAVENOUS at 08:44

## 2021-07-16 RX ADMIN — METOPROLOL SUCCINATE 25 MG: 25 TABLET, EXTENDED RELEASE ORAL at 08:47

## 2021-07-16 RX ADMIN — GABAPENTIN 600 MG: 300 CAPSULE ORAL at 08:45

## 2021-07-16 RX ADMIN — LEVOTHYROXINE SODIUM 88 MCG: 88 TABLET ORAL at 08:47

## 2021-07-16 RX ADMIN — INSULIN ASPART 1 UNITS: 100 INJECTION, SOLUTION INTRAVENOUS; SUBCUTANEOUS at 08:48

## 2021-07-16 RX ADMIN — PIPERACILLIN SODIUM AND TAZOBACTAM SODIUM 2.25 G: 2; .25 INJECTION, POWDER, LYOPHILIZED, FOR SOLUTION INTRAVENOUS at 03:06

## 2021-07-16 RX ADMIN — LISINOPRIL 40 MG: 40 TABLET ORAL at 08:47

## 2021-07-16 RX ADMIN — CYCLOBENZAPRINE 10 MG: 5 TABLET, FILM COATED ORAL at 08:45

## 2021-07-16 RX ADMIN — AZITHROMYCIN MONOHYDRATE 250 MG: 500 INJECTION, POWDER, LYOPHILIZED, FOR SOLUTION INTRAVENOUS at 09:25

## 2021-07-16 RX ADMIN — MEMANTINE HYDROCHLORIDE 28 MG: 28 CAPSULE, EXTENDED RELEASE ORAL at 08:45

## 2021-07-16 ASSESSMENT — ACTIVITIES OF DAILY LIVING (ADL)
ADLS_ACUITY_SCORE: 28

## 2021-07-16 NOTE — PROGRESS NOTES
Care Management Discharge Note    Discharge Date: 07/16/2021       Discharge Disposition: Home Care    Discharge Services: Homecare     Discharge DME:  None    Discharge Transportation:  [facility] [employer] at 1:30 pm    Private pay costs discussed: transportation costs        Patient/family educated on Medicare website which has current facility and service quality ratings:  Yes regarding home care    Education Provided on the Discharge Plan:  Yes to family  Persons Notified of Discharge Plans: Erin  Patient/Family in Agreement with the Plan: yes    Handoff Referral Completed: Yes    Additional Information:  Spoke with patients son-in-law  Erin . Discussed discharge plans. Patient has 12/hrs of PCA through [facility] and family provides the service. Patient would benefit from Home RN PT and OT service. Information sent to intake at [employer] fax [phone] and phone # is [phone]    Andrews Nance RN  Inpatient Care Coordinator  [facility]  # [phone]

## 2021-07-16 NOTE — PROGRESS NOTES
Received intake call for home oxygen at 3:46PM 7/15/21. Reviewed patient's chart; Patient qualifies under insurance guidelines and all documentation is in the chart including a good order.   9:13AM 7/16/21- Called to offer choice and patient is okay with [facility] setting them up. Discussed equipment with patient and informed them that we would be to bedside with oxygen in the next 2 hours.   4:07PM 7/15/21- Spoke with care coordinator, Andrews, confirmed we received the order, and provided them with ETA of oxygen.

## 2021-07-16 NOTE — PROVIDER NOTIFICATION
MD Notification    Notified Person: MD    Notified Person Name: Gila    Notification Date/Time: 7/16/21 9961    Notification Interaction: Need finalized discharge order ,please advise.     Purpose of Notification:    Orders Received:    Comments:

## 2021-07-16 NOTE — PROGRESS NOTES
Discharge    Patient discharged to home via EMS with all belongings.      Listed belongings gathered and returned to patient. yes  Belongings returned to patient from security/pharmacy n/a  Care Plan and Patient education resolved: yes  Prescriptions if needed, hard copies sent with patient  yes  Home and hospital acquired medications returned to patient: n/a  Medication Bin checked and emptied on discharge yes  Follow up appointment made for patient: n/a

## 2021-07-16 NOTE — PLAN OF CARE
SUMMARY: Pneumonia (COVID+ - hx of TB)  DATE/TIME: 7/15/21-21 6913-1700  Cognitive Concerns/ Orientation : A&Ox3; disoriented to time. Zimbabwean speaking, Jabber utilized.  BEHAVIOR & AGGRESSION TOOL COLOR: Green  ABNL VS/O2: VSS on 1L NC, de-sats to 88% occassionally with exertion and moving in bed.   MOBILITY: Total, T/R q2hr. Lift for transfers.   PAIN MANAGMENT: Denies  DIET: Mod CHO, assist with feeding  BOWEL/BLADDER: Pena in place; patent, incontinent of stool, no BM overnight  ABNL LAB/BG: B  DRAIN/DEVICES: L PIV SL with int abx, pena for retention  TELEMETRY RHYTHM: NSR  SKIN: Rash/redness to abdominal and breast folds, powder applied. Mepilex on coccyx CDI. Jonathan LE mehran/pale.   TESTS/PROCEDURES: N/A  D/C DAY/GOALS/PLACE: To home today at 1:00pm, ride set up.  OTHER IMPORTANT INFO: Keep legs elevated- blood pooled in LE when in chair.

## 2021-07-16 NOTE — DISCHARGE INSTRUCTIONS
Oxygen Provider:  Arranged through [email or web address], contact number [phone].  If you have any questions or concerns please call the oxygen company directly.

## 2021-07-16 NOTE — PROGRESS NOTES
[facility]  Infectious Disease Progress Note          Assessment and Plan:   IMPRESSION:    1.  A 89-year-old female, recent COVID-19 that was symptomatic, now with recurrent worsening respiratory symptoms, second flare up since the diagnosis, likely COVID related damage but concern for secondary bacterial pneumonia.  2.  Recent COVID, likely COVID recovered at this point, but given the active respiratory symptoms, hard to exclude still contagious.  3.  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  4.  Current CT scan with multiple changes, possible old asbestos, changes of old tuberculosis, some bilateral infiltrates that are worse, does not look like tuberculosis.  5.  Diabetes mellitus.     RECOMMENDATIONS:    1.  Continue COVID precautions for now given the active respiratory symptoms, but probably is COVID recovered.  Certainly no specific treatment for COVID indicated at this point.  2.  Conventional antibiotics as we are doing, is down to 1 liter of oxygen.  Does not look toxic or ill.  Possibility of conventional pneumonia vs viral vs commonly seen late COVID lung damage worsening,  no fever,+ micro and procal low   3.  From a TB standpoint, no particular testing at this point, not really producing sputum.  I do not think she needs a bronchoscopy.  If she clinically improves with conventional antibiotics or time, would not pursue this further.  4 OKcomplete zpak  Course and 1 week augmentin po as soon as any time and disposition  iso wise likely is COVID recovered, assuming resp status Ok 1 week from now discontinue iso, low risk to family at home        Interval History:   no new complaints and doing well; no cp, sob, n/v/d, or abd pain O2 stable.              Medications:       azithromycin  250 mg Intravenous Q24H     citalopram  20 mg Oral Daily     cyclobenzaprine  10 mg Oral Daily     donepezil  10 mg Oral At Bedtime      fluocinolone   Topical BID     fluticasone  1 puff Inhalation Daily     gabapentin  600 mg Oral TID     insulin aspart  1-7 Units Subcutaneous TID AC     insulin aspart  1-5 Units Subcutaneous At Bedtime     levothyroxine  88 mcg Oral Daily     lisinopril  40 mg Oral Daily     memantine XR  28 mg Oral Daily     metoprolol succinate ER  25 mg Oral Daily     piperacillin-tazobactam  2.25 g Intravenous Q6H     rivaroxaban ANTICOAGULANT  10 mg Oral Daily with supper     simvastatin  40 mg Oral At Bedtime     sodium chloride (PF)  3 mL Intracatheter Q8H                  Physical Exam:   Blood pressure 138/73, pulse 78, temperature 98  F (36.7  C), temperature source Oral, resp. rate 20, height 1.524 m (5'), weight 68 kg (149 lb 14.6 oz), SpO2 96 %.  Wt Readings from Last 2 Encounters:   07/15/21 68 kg (149 lb 14.6 oz)   07/29/14 84.1 kg (185 lb 4.8 oz)     Vital Signs with Ranges  Temp:  [97.6  F (36.4  C)-98.6  F (37  C)] 98  F (36.7  C)  Pulse:  [69-81] 78  Resp:  [18-20] 20  BP: (111-138)/(54-73) 138/73  SpO2:  [93 %-98 %] 96 %    Constitutional: Awake, alert, cooperative, no apparent distress looks OK   Lungs: Clear to auscultation bilaterally, no crackles or wheezing   Cardiovascular: Regular rate and rhythm, normal S1 and S2, and no murmur noted   Abdomen: Normal bowel sounds, soft, non-distended, non-tender   Skin: No rashes, no cyanosis, no edema   Other:           Data:   All microbiology laboratory data reviewed.  Recent Labs   Lab Test 07/15/21  1048 07/12/21  1012 07/11/21 1958   WBC 7.1 7.6 12.1*   HGB 9.5* 10.8* 11.1*   HCT 29.4* 34.4* 34.1*   MCV 95 94 93    197 258     Recent Labs   Lab Test 07/12/21  1012 07/11/21 1958 07/05/21  0643   CR 1.06* 1.20* 0.85     No lab results found.  No lab results found.    Invalid input(s): ANABEL

## 2021-07-16 NOTE — PROVIDER NOTIFICATION
MD Notification    Notified Person: MD    Notified Person Name: Gila    Notification Date/Time: 7/16/21 1119    Notification Interaction: Can I get order to pull pena for discharge? Thanks!    Purpose of Notification:    Orders Received: Orders placed    Comments:

## 2021-07-16 NOTE — PLAN OF CARE
SUMMARY: Pneumonia (COVID+ - hx of TB)  DATE/TIME: 7/15/21 6234-0947  Cognitive Concerns/ Orientation : A&Ox2; disoriented to time & situation. Jordanian speaking, Jabber utilized.  BEHAVIOR & AGGRESSION TOOL COLOR: Green  ABNL VS/O2: VSS, on 1L NC overnight due to feeling short of breath. Desats with activity. 98% at rest.  MOBILITY: Total, T/R q2hr. Lift for transfers. Up in chair last shift.  PAIN MANAGMENT: Denies  DIET: Mod CHO, assist with feeding  BOWEL/BLADDER: Hawley in place; patent. 2 soft/watery BMs last shift, incontinent.   ABNL LAB/BG: B (sliding scale insulin) , HgB: 9.5 (trending down, no active signs of bleeding)  DRAIN/DEVICES: L PIV SL with int abx  TELEMETRY RHYTHM: NSR  SKIN: Rash/redness to abdominal and breast folds, powder applied. Mepilex on coccyx CDI. Jonathan LE mehran/pale.   TESTS/PROCEDURES: N/A  D/C DAY/GOALS/PLACE: To home tomorrow at 1:00pm, ride set up.  OTHER IMPORTANT INFO: Keep legs elevated- blood pooled in LE when in chair last shift.

## 2021-07-17 LAB — BACTERIA BLD CULT: NO GROWTH

## 2021-08-05 ENCOUNTER — HOSPITAL ENCOUNTER (INPATIENT)
Facility: CLINIC | Age: 86
LOS: 5 days | Discharge: HOME HEALTH | DRG: 189 | End: 2021-08-10
Attending: EMERGENCY MEDICINE | Admitting: INTERNAL MEDICINE
Payer: COMMERCIAL

## 2021-08-05 ENCOUNTER — APPOINTMENT (OUTPATIENT)
Dept: CT IMAGING | Facility: CLINIC | Age: 86
DRG: 189 | End: 2021-08-05
Attending: EMERGENCY MEDICINE
Payer: COMMERCIAL

## 2021-08-05 DIAGNOSIS — G93.40 ENCEPHALOPATHY: Primary | ICD-10-CM

## 2021-08-05 DIAGNOSIS — R19.7 DIARRHEA, UNSPECIFIED TYPE: ICD-10-CM

## 2021-08-05 DIAGNOSIS — U07.1 2019 NOVEL CORONAVIRUS DISEASE (COVID-19): ICD-10-CM

## 2021-08-05 DIAGNOSIS — R26.89 OTHER ABNORMALITIES OF GAIT AND MOBILITY: ICD-10-CM

## 2021-08-05 DIAGNOSIS — I10 HYPERTENSION, UNSPECIFIED TYPE: ICD-10-CM

## 2021-08-05 DIAGNOSIS — J96.21 ACUTE ON CHRONIC RESPIRATORY FAILURE WITH HYPOXIA (H): ICD-10-CM

## 2021-08-05 DIAGNOSIS — M62.81 GENERALIZED MUSCLE WEAKNESS: ICD-10-CM

## 2021-08-05 DIAGNOSIS — R00.0 TACHYCARDIA: ICD-10-CM

## 2021-08-05 LAB
ALBUMIN SERPL-MCNC: 2.5 G/DL (ref 3.4–5)
ALP SERPL-CCNC: 74 U/L (ref 40–150)
ALT SERPL W P-5'-P-CCNC: 17 U/L (ref 0–50)
ANION GAP SERPL CALCULATED.3IONS-SCNC: 6 MMOL/L (ref 3–14)
AST SERPL W P-5'-P-CCNC: 15 U/L (ref 0–45)
BASE EXCESS BLDV CALC-SCNC: 1.4 MMOL/L (ref -7.7–1.9)
BASOPHILS # BLD AUTO: 0.1 10E3/UL (ref 0–0.2)
BASOPHILS NFR BLD AUTO: 1 %
BILIRUB SERPL-MCNC: 0.5 MG/DL (ref 0.2–1.3)
BUN SERPL-MCNC: 12 MG/DL (ref 7–30)
CALCIUM SERPL-MCNC: 8.9 MG/DL (ref 8.5–10.1)
CHLORIDE BLD-SCNC: 104 MMOL/L (ref 94–109)
CO2 SERPL-SCNC: 27 MMOL/L (ref 20–32)
CREAT SERPL-MCNC: 1.1 MG/DL (ref 0.52–1.04)
D DIMER PPP FEU-MCNC: 4.02 UG/ML FEU (ref 0–0.5)
EOSINOPHIL # BLD AUTO: 0.2 10E3/UL (ref 0–0.7)
EOSINOPHIL NFR BLD AUTO: 2 %
ERYTHROCYTE [DISTWIDTH] IN BLOOD BY AUTOMATED COUNT: 14.4 % (ref 10–15)
GFR SERPL CREATININE-BSD FRML MDRD: 43 ML/MIN/1.73M2
GLUCOSE BLD-MCNC: 240 MG/DL (ref 70–99)
GLUCOSE BLDC GLUCOMTR-MCNC: 189 MG/DL (ref 70–99)
HCO3 BLDV-SCNC: 28 MMOL/L (ref 21–28)
HCT VFR BLD AUTO: 33.7 % (ref 35–47)
HGB BLD-MCNC: 10.6 G/DL (ref 11.7–15.7)
IMM GRANULOCYTES # BLD: 0.1 10E3/UL
IMM GRANULOCYTES NFR BLD: 2 %
LACTATE SERPL-SCNC: 3.8 MMOL/L (ref 0.7–2)
LYMPHOCYTES # BLD AUTO: 1.5 10E3/UL (ref 0.8–5.3)
LYMPHOCYTES NFR BLD AUTO: 17 %
MCH RBC QN AUTO: 30.9 PG (ref 26.5–33)
MCHC RBC AUTO-ENTMCNC: 31.5 G/DL (ref 31.5–36.5)
MCV RBC AUTO: 98 FL (ref 78–100)
MONOCYTES # BLD AUTO: 1 10E3/UL (ref 0–1.3)
MONOCYTES NFR BLD AUTO: 12 %
NEUTROPHILS # BLD AUTO: 5.8 10E3/UL (ref 1.6–8.3)
NEUTROPHILS NFR BLD AUTO: 66 %
NRBC # BLD AUTO: 0 10E3/UL
NRBC BLD AUTO-RTO: 0 /100
O2/TOTAL GAS SETTING VFR VENT: 0 %
PCO2 BLDV: 53 MM HG (ref 40–50)
PH BLDV: 7.34 [PH] (ref 7.32–7.43)
PLATELET # BLD AUTO: 241 10E3/UL (ref 150–450)
PO2 BLDV: 18 MM HG (ref 25–47)
POTASSIUM BLD-SCNC: 4.3 MMOL/L (ref 3.4–5.3)
PROCALCITONIN SERPL-MCNC: <0.05 NG/ML
PROT SERPL-MCNC: 7.3 G/DL (ref 6.8–8.8)
RBC # BLD AUTO: 3.43 10E6/UL (ref 3.8–5.2)
SARS-COV-2 RNA RESP QL NAA+PROBE: NEGATIVE
SODIUM SERPL-SCNC: 137 MMOL/L (ref 133–144)
TROPONIN I SERPL-MCNC: <0.015 UG/L (ref 0–0.04)
WBC # BLD AUTO: 8.7 10E3/UL (ref 4–11)

## 2021-08-05 PROCEDURE — 82040 ASSAY OF SERUM ALBUMIN: CPT | Performed by: EMERGENCY MEDICINE

## 2021-08-05 PROCEDURE — 250N000009 HC RX 250: Performed by: EMERGENCY MEDICINE

## 2021-08-05 PROCEDURE — 85379 FIBRIN DEGRADATION QUANT: CPT | Performed by: EMERGENCY MEDICINE

## 2021-08-05 PROCEDURE — 96361 HYDRATE IV INFUSION ADD-ON: CPT

## 2021-08-05 PROCEDURE — 250N000013 HC RX MED GY IP 250 OP 250 PS 637: Performed by: INTERNAL MEDICINE

## 2021-08-05 PROCEDURE — C9803 HOPD COVID-19 SPEC COLLECT: HCPCS

## 2021-08-05 PROCEDURE — 36415 COLL VENOUS BLD VENIPUNCTURE: CPT | Performed by: INTERNAL MEDICINE

## 2021-08-05 PROCEDURE — 250N000011 HC RX IP 250 OP 636: Performed by: EMERGENCY MEDICINE

## 2021-08-05 PROCEDURE — 36415 COLL VENOUS BLD VENIPUNCTURE: CPT | Performed by: EMERGENCY MEDICINE

## 2021-08-05 PROCEDURE — 85025 COMPLETE CBC W/AUTO DIFF WBC: CPT | Performed by: EMERGENCY MEDICINE

## 2021-08-05 PROCEDURE — 87040 BLOOD CULTURE FOR BACTERIA: CPT | Performed by: EMERGENCY MEDICINE

## 2021-08-05 PROCEDURE — 93005 ELECTROCARDIOGRAM TRACING: CPT

## 2021-08-05 PROCEDURE — 87635 SARS-COV-2 COVID-19 AMP PRB: CPT | Performed by: INTERNAL MEDICINE

## 2021-08-05 PROCEDURE — 120N000001 HC R&B MED SURG/OB

## 2021-08-05 PROCEDURE — 84484 ASSAY OF TROPONIN QUANT: CPT | Performed by: EMERGENCY MEDICINE

## 2021-08-05 PROCEDURE — 83605 ASSAY OF LACTIC ACID: CPT | Performed by: INTERNAL MEDICINE

## 2021-08-05 PROCEDURE — 82803 BLOOD GASES ANY COMBINATION: CPT | Performed by: EMERGENCY MEDICINE

## 2021-08-05 PROCEDURE — 71275 CT ANGIOGRAPHY CHEST: CPT

## 2021-08-05 PROCEDURE — 99223 1ST HOSP IP/OBS HIGH 75: CPT | Mod: AI | Performed by: INTERNAL MEDICINE

## 2021-08-05 PROCEDURE — 258N000003 HC RX IP 258 OP 636: Performed by: INTERNAL MEDICINE

## 2021-08-05 PROCEDURE — 84145 PROCALCITONIN (PCT): CPT | Performed by: EMERGENCY MEDICINE

## 2021-08-05 PROCEDURE — 258N000003 HC RX IP 258 OP 636: Performed by: EMERGENCY MEDICINE

## 2021-08-05 PROCEDURE — 96360 HYDRATION IV INFUSION INIT: CPT

## 2021-08-05 PROCEDURE — 99285 EMERGENCY DEPT VISIT HI MDM: CPT | Mod: 25

## 2021-08-05 RX ORDER — CITALOPRAM HYDROBROMIDE 20 MG/1
20 TABLET ORAL DAILY
Status: DISCONTINUED | OUTPATIENT
Start: 2021-08-05 | End: 2021-08-10 | Stop reason: HOSPADM

## 2021-08-05 RX ORDER — FLUOCINOLONE ACETONIDE 0.25 MG/G
CREAM TOPICAL 2 TIMES DAILY
COMMUNITY
Start: 2021-04-12

## 2021-08-05 RX ORDER — ONDANSETRON 2 MG/ML
4 INJECTION INTRAMUSCULAR; INTRAVENOUS EVERY 6 HOURS PRN
Status: DISCONTINUED | OUTPATIENT
Start: 2021-08-05 | End: 2021-08-10 | Stop reason: HOSPADM

## 2021-08-05 RX ORDER — SIMVASTATIN 40 MG
40 TABLET ORAL DAILY
Status: DISCONTINUED | OUTPATIENT
Start: 2021-08-06 | End: 2021-08-10 | Stop reason: HOSPADM

## 2021-08-05 RX ORDER — QUETIAPINE FUMARATE 50 MG/1
50 TABLET, EXTENDED RELEASE ORAL
COMMUNITY
Start: 2021-07-29

## 2021-08-05 RX ORDER — ONDANSETRON 4 MG/1
4 TABLET, ORALLY DISINTEGRATING ORAL EVERY 6 HOURS PRN
Status: DISCONTINUED | OUTPATIENT
Start: 2021-08-05 | End: 2021-08-10 | Stop reason: HOSPADM

## 2021-08-05 RX ORDER — LEVOTHYROXINE SODIUM 88 UG/1
88 TABLET ORAL
Status: DISCONTINUED | OUTPATIENT
Start: 2021-08-06 | End: 2021-08-10 | Stop reason: HOSPADM

## 2021-08-05 RX ORDER — GUAIFENESIN/DEXTROMETHORPHAN 100-10MG/5
10 SYRUP ORAL 4 TIMES DAILY PRN
Status: DISCONTINUED | OUTPATIENT
Start: 2021-08-05 | End: 2021-08-10 | Stop reason: HOSPADM

## 2021-08-05 RX ORDER — METOPROLOL SUCCINATE 25 MG/1
25 TABLET, EXTENDED RELEASE ORAL DAILY
Status: DISCONTINUED | OUTPATIENT
Start: 2021-08-06 | End: 2021-08-10 | Stop reason: HOSPADM

## 2021-08-05 RX ORDER — LIDOCAINE 50 MG/G
1-2 PATCH TOPICAL DAILY PRN
COMMUNITY
Start: 2021-08-03

## 2021-08-05 RX ORDER — QUETIAPINE FUMARATE 50 MG/1
50 TABLET, EXTENDED RELEASE ORAL AT BEDTIME
Status: DISCONTINUED | OUTPATIENT
Start: 2021-08-05 | End: 2021-08-10 | Stop reason: HOSPADM

## 2021-08-05 RX ORDER — PROCHLORPERAZINE MALEATE 5 MG
5 TABLET ORAL EVERY 6 HOURS PRN
Status: DISCONTINUED | OUTPATIENT
Start: 2021-08-05 | End: 2021-08-10 | Stop reason: HOSPADM

## 2021-08-05 RX ORDER — FAMOTIDINE 20 MG/1
20 TABLET, FILM COATED ORAL 2 TIMES DAILY
Status: DISCONTINUED | OUTPATIENT
Start: 2021-08-05 | End: 2021-08-07

## 2021-08-05 RX ORDER — IOPAMIDOL 755 MG/ML
500 INJECTION, SOLUTION INTRAVASCULAR ONCE
Status: COMPLETED | OUTPATIENT
Start: 2021-08-05 | End: 2021-08-05

## 2021-08-05 RX ORDER — SODIUM CHLORIDE 9 MG/ML
INJECTION, SOLUTION INTRAVENOUS CONTINUOUS
Status: DISCONTINUED | OUTPATIENT
Start: 2021-08-05 | End: 2021-08-06

## 2021-08-05 RX ORDER — HALOPERIDOL 0.5 MG/1
.5-1 TABLET ORAL EVERY 6 HOURS PRN
Status: DISCONTINUED | OUTPATIENT
Start: 2021-08-05 | End: 2021-08-07

## 2021-08-05 RX ORDER — NICOTINE POLACRILEX 4 MG
15-30 LOZENGE BUCCAL
Status: DISCONTINUED | OUTPATIENT
Start: 2021-08-05 | End: 2021-08-10 | Stop reason: HOSPADM

## 2021-08-05 RX ORDER — FLUTICASONE PROPIONATE 50 MCG
2 SPRAY, SUSPENSION (ML) NASAL DAILY PRN
Status: DISCONTINUED | OUTPATIENT
Start: 2021-08-05 | End: 2021-08-10 | Stop reason: HOSPADM

## 2021-08-05 RX ORDER — DEXTROSE MONOHYDRATE 25 G/50ML
25-50 INJECTION, SOLUTION INTRAVENOUS
Status: DISCONTINUED | OUTPATIENT
Start: 2021-08-05 | End: 2021-08-10 | Stop reason: HOSPADM

## 2021-08-05 RX ORDER — ZOLPIDEM TARTRATE 5 MG/1
5 TABLET ORAL AT BEDTIME
Status: DISCONTINUED | OUTPATIENT
Start: 2021-08-05 | End: 2021-08-10 | Stop reason: HOSPADM

## 2021-08-05 RX ORDER — GABAPENTIN 300 MG/1
600 CAPSULE ORAL 3 TIMES DAILY
Status: DISCONTINUED | OUTPATIENT
Start: 2021-08-05 | End: 2021-08-10 | Stop reason: HOSPADM

## 2021-08-05 RX ORDER — OLOPATADINE HYDROCHLORIDE 1 MG/ML
1 SOLUTION/ DROPS OPHTHALMIC 2 TIMES DAILY
Status: DISCONTINUED | OUTPATIENT
Start: 2021-08-05 | End: 2021-08-10 | Stop reason: HOSPADM

## 2021-08-05 RX ORDER — CYCLOBENZAPRINE HCL 5 MG
10 TABLET ORAL DAILY
Status: DISCONTINUED | OUTPATIENT
Start: 2021-08-06 | End: 2021-08-10 | Stop reason: HOSPADM

## 2021-08-05 RX ORDER — ACETAMINOPHEN 325 MG/1
650 TABLET ORAL EVERY 8 HOURS PRN
Status: DISCONTINUED | OUTPATIENT
Start: 2021-08-05 | End: 2021-08-10 | Stop reason: HOSPADM

## 2021-08-05 RX ORDER — DONEPEZIL HYDROCHLORIDE 10 MG/1
10 TABLET, FILM COATED ORAL AT BEDTIME
Status: DISCONTINUED | OUTPATIENT
Start: 2021-08-05 | End: 2021-08-10 | Stop reason: HOSPADM

## 2021-08-05 RX ORDER — VANCOMYCIN HYDROCHLORIDE 125 MG/1
125 CAPSULE ORAL 4 TIMES DAILY
Status: DISCONTINUED | OUTPATIENT
Start: 2021-08-05 | End: 2021-08-06

## 2021-08-05 RX ORDER — LIDOCAINE 4 G/G
1-2 PATCH TOPICAL DAILY PRN
Status: DISCONTINUED | OUTPATIENT
Start: 2021-08-05 | End: 2021-08-10 | Stop reason: HOSPADM

## 2021-08-05 RX ORDER — ALBUTEROL SULFATE 90 UG/1
2 AEROSOL, METERED RESPIRATORY (INHALATION) EVERY 6 HOURS PRN
Status: DISCONTINUED | OUTPATIENT
Start: 2021-08-05 | End: 2021-08-10 | Stop reason: HOSPADM

## 2021-08-05 RX ORDER — PROCHLORPERAZINE 25 MG
12.5 SUPPOSITORY, RECTAL RECTAL EVERY 12 HOURS PRN
Status: DISCONTINUED | OUTPATIENT
Start: 2021-08-05 | End: 2021-08-10 | Stop reason: HOSPADM

## 2021-08-05 RX ORDER — LISINOPRIL 40 MG/1
40 TABLET ORAL DAILY
Status: DISCONTINUED | OUTPATIENT
Start: 2021-08-05 | End: 2021-08-06

## 2021-08-05 RX ORDER — LIDOCAINE 40 MG/G
CREAM TOPICAL
Status: DISCONTINUED | OUTPATIENT
Start: 2021-08-05 | End: 2021-08-10 | Stop reason: HOSPADM

## 2021-08-05 RX ADMIN — FAMOTIDINE 20 MG: 20 TABLET ORAL at 20:41

## 2021-08-05 RX ADMIN — RIVAROXABAN 10 MG: 10 TABLET, FILM COATED ORAL at 20:41

## 2021-08-05 RX ADMIN — ACETAMINOPHEN 650 MG: 325 TABLET, FILM COATED ORAL at 20:41

## 2021-08-05 RX ADMIN — ZOLPIDEM TARTRATE 5 MG: 5 TABLET, COATED ORAL at 22:03

## 2021-08-05 RX ADMIN — VANCOMYCIN HYDROCHLORIDE 125 MG: 125 CAPSULE ORAL at 20:41

## 2021-08-05 RX ADMIN — IOPAMIDOL 55 ML: 755 INJECTION, SOLUTION INTRAVENOUS at 15:01

## 2021-08-05 RX ADMIN — SODIUM CHLORIDE 75 ML: 9 INJECTION, SOLUTION INTRAVENOUS at 15:02

## 2021-08-05 RX ADMIN — SODIUM CHLORIDE 500 ML: 9 INJECTION, SOLUTION INTRAVENOUS at 22:33

## 2021-08-05 RX ADMIN — DONEPEZIL HYDROCHLORIDE 10 MG: 10 TABLET ORAL at 22:03

## 2021-08-05 RX ADMIN — CITALOPRAM HYDROBROMIDE 20 MG: 20 TABLET ORAL at 20:41

## 2021-08-05 RX ADMIN — VANCOMYCIN HYDROCHLORIDE 125 MG: 125 CAPSULE ORAL at 22:03

## 2021-08-05 RX ADMIN — SODIUM CHLORIDE: 9 INJECTION, SOLUTION INTRAVENOUS at 20:25

## 2021-08-05 RX ADMIN — SODIUM CHLORIDE 1000 ML: 9 INJECTION, SOLUTION INTRAVENOUS at 13:41

## 2021-08-05 RX ADMIN — QUETIAPINE FUMARATE 50 MG: 50 TABLET, EXTENDED RELEASE ORAL at 22:02

## 2021-08-05 RX ADMIN — OLOPATADINE HYDROCHLORIDE 1 DROP: 1 SOLUTION OPHTHALMIC at 22:05

## 2021-08-05 RX ADMIN — GABAPENTIN 600 MG: 300 CAPSULE ORAL at 22:03

## 2021-08-05 RX ADMIN — LISINOPRIL 40 MG: 40 TABLET ORAL at 20:41

## 2021-08-05 RX ADMIN — FLUTICASONE FUROATE 1 PUFF: 200 POWDER RESPIRATORY (INHALATION) at 21:58

## 2021-08-05 RX ADMIN — HYDRALAZINE HYDROCHLORIDE 12.5 MG: 25 TABLET, FILM COATED ORAL at 22:02

## 2021-08-05 ASSESSMENT — ENCOUNTER SYMPTOMS
ABDOMINAL PAIN: 0
SHORTNESS OF BREATH: 1
BLOOD IN STOOL: 0
DIARRHEA: 1
VOMITING: 0
FEVER: 0

## 2021-08-05 ASSESSMENT — ACTIVITIES OF DAILY LIVING (ADL): ADLS_ACUITY_SCORE: 28

## 2021-08-05 NOTE — ED TRIAGE NOTES
Arrives via EMS from home where home health nurse called due to increased shortness of breath and diarrhea. 89% on RA per home health nurse, satting mid 90%'s on RA, placed on 2L O2 satting 99%.  Here 90% on RA. Dx with covid in June. Daughter, Belinda, on the way.  Belinda's number is [phone]. ABCD's intact; a/O x 4.

## 2021-08-05 NOTE — ED PROVIDER NOTES
History   Chief Complaint:  Shortness of breath and diarrhea        The history is provided by the patient. A  was used (Mauritian).      Nhi Perry is a 89 year old female with history of diabetes, ASCVD, and thyroid disease who presents via EMS for evaluation of shortness of breath and diarrhea. EMS reports that her home health nurse called because she had been deteriorating over the past few days, including increased shortness of breath and diarrhea. She is normally on oxygen at night, but today they placed her on 2L during the day when her oxygen sats went down to the 80s. EMS says her vitals have been stable, she does not have a fever, and she did not fall. The patient confirms that she has had diarrhea, but says she feels fine otherwise. She denies any chest pain, abdominal pain, blood in her stool, vomiting, or current difficulty breathing. Of note, she was recently admitted to the hospital on 7/01 and 7/11 for pneumonia due to COVID-19.      Review of Systems   Constitutional: Negative for fever.   Respiratory: Positive for shortness of breath.    Cardiovascular: Negative for chest pain.   Gastrointestinal: Positive for diarrhea. Negative for abdominal pain, blood in stool and vomiting.   All other systems reviewed and are negative.    Allergies:  Seasonal Allergies    Medications:  Albuterol  Dulcolax  Celebrex  Zyrtec  Celexa  Flexeril  Decadron  Aricept  Synalar  Flonase  Neurontin  Flovent  Levothyroxine  Zestril  Robitussin  Namenda  Metformin  Toprol  Asmatex  Patanol  Xarelto  Senna-docusate  Zocor  Ambien    Past Medical History:    Arthritis  ASCVD  Diabetes  Hypertension  Hypothyroidism  Intertrochanteric fracture of left femur  Osteoporosis  Pulmonary tuberculosis  Retina disorder  Thyroid disease  Hyperkalemia  Hyponatremia  Encephalopathy  Pneumonia  Hip fracture    Past Surgical History:    Carotid endarterectomy   Cholecystectomy  Eye surgery, right  cataract  Lithotripsy for kidney stone  Colonoscopy  Open reduction internal fixation hip nailing     Family History:    Cancer  Alzheimer disease  Hypertension    Social History:  The patient speaks Fijian. She has a daughter, who joined her in the ED.      Physical Exam     Patient Vitals for the past 24 hrs:   BP Temp Temp src Pulse Resp SpO2   08/09/21 0852 (!) 166/75 98.3  F (36.8  C) Oral 95 20 95 %   08/08/21 2300 (!) 124/97 98.5  F (36.9  C) Axillary 106 20 92 %   08/08/21 1918 (!) 171/75 99.3  F (37.4  C) Axillary 104 22 96 %   08/08/21 1525 (!) 167/68 97.8  F (36.6  C) Oral 106 24 94 %   08/08/21 1004 137/62 -- -- 105 20 93 %       Physical Exam  General: elderly   Head: The scalp, face, and head appear normal  Eyes: The pupils are equal, round, and reactive to light. Conjunctivae and sclerae are normal  Neck: Normal range of motion.   CV: tachycardiac   Resp: 2 L NC, mild tachypnea but no wheezing or coarse lung sounds  GI: Abdomen is soft, no rigidity, guarding, or rebound. No distension. No tenderness to palpation in any quadrant.     MS: Normal tone. Joints grossly normal without effusions. No asymmetric leg swelling, calf or thigh tenderness.    Skin: No rash or lesions noted. Normal capillary refill noted  Neuro: Speech is normal and fluent. Face is symmetric. Moving all extremities.   Psych:  Normal affect.  Appropriate interactions.      Emergency Department Course   ECG  ECG taken at 1309, ECG read at 1338  Sinus tachycardia with frequent premature ventricular complexes  Otherwise normal ECG   No significant change as compared to prior, dated 7/1/21.  Rate 111 bpm. UT interval 160 ms. QRS duration 68 ms. QT/QTc 346/470 ms. P-R-T axes 70 41 79.     Imaging:    CT Head w/o Contrast   Final Result   IMPRESSION:   1.  No finding for intracranial hemorrhage or mass or convincing finding for acute infarct.      2.  Moderate volume loss and presumed sequela chronic microvascular ischemic change.      CT  Chest Pulmonary Embolism w Contrast   Final Result   IMPRESSION:   1.  No pulmonary artery embolism or thoracic aortic dissection.   2.  Mild to moderate diffuse bilateral multilobar pulmonary airspace   opacities which are mild to moderately improved since the prior CT   from 7/12/2021, likely reflecting resolving pneumonia. No pleural   effusion.      GERSON MORRISON MD            SYSTEM ID:  [id number]          Laboratory:    CBC: WBC 8.7, HGB 10.6 (L),    CMP: Creatinine 1.10 (H), Glucose 240 (H), Albumin 2.5 (L), GFR 43 (L) o/w WNL    Ddimer: 4.02 (H)  Troponin (Collected 1334): <0.015    Blood gas venous: PCO2 53 (H), PO2 18 (L) o/w WNL  Procalcitonin: pending    Blood cultures pending x2      Emergency Department Course:    Reviewed:  I reviewed nursing notes, vitals, past medical history and care everywhere    Assessments:  1258 I obtained history and examined the patient as noted above.     Consults:   I consulted with Dr. Collier, hospitalist, regarding the patient's history and presentation here in the emergency department who accepted the patient for admission.    Interventions:  NS 1 L IV    Disposition:  The patient was admitted to the hospital under the care of Dr. Collier.       Impression & Plan     Medical Decision Making:  This is a 89-year-old woman with complex past medical history who is recently been admitted on multiple occasions for acute respiratory distress in the setting of recent COVID-19 infection.  At home she is currently on 2 L of nasal cannula at night however daughter reports over the past few days she has had increasing shortness of breath and now diarrhea.  When EMS arrived she had oxygen saturations in the 80s and was placed on 2 L of nasal cannula with good improvement.  Upon initial evaluation here she is hypertensive and mildly tachycardic but on 2 L of nasal cannula is oxygenating in the mid 90s.  She does not appear in acute distress.  Broad work-up was completed to  further investigate her shortness of breath without any clear exacerbating factors.  Unfortunately the daughter reports that she can no longer care for her at home in her current condition.  Likely will require placement to a nursing home.    Covid-19  Nhi Perry was evaluated during a global COVID-19 pandemic, which necessitated consideration that the patient might be at risk for infection with the SARS-CoV-2 virus that causes COVID-19.   Applicable protocols for evaluation were followed during the patient's care.       Diagnosis:    ICD-10-CM    1. Encephalopathy  G93.40 QUEtiapine (SEROquel) half-tab 12.5 mg   2. Acute on chronic respiratory failure with hypoxia (H)  J96.21    3. 2019 novel coronavirus disease (COVID-19)  U07.1    4. Diarrhea, unspecified type  R19.7    5. Tachycardia  R00.0        Discharge Medications:  Current Discharge Medication List          Scribe Disclosure:  I, Courtney Sheppard, am serving as a scribe at 1:01 PM on 8/5/2021 to document services personally performed by Gregory Lopez MD based on my observations and the provider's statements to me.      Gregory Lopez MD  08/09/21 0615

## 2021-08-05 NOTE — ED NOTES
.  [facility]  ED Nurse Handoff Report    Nhi Perry is a 89 year old female   ED Chief complaint: increased shortness of breath  . ED Diagnosis:   Final diagnoses:   None     Allergies:   Allergies   Allergen Reactions     Seasonal Allergies        Code Status: Full Code  Activity level - Baseline/Home:  Independent. Activity Level - Current:   Independent. Lift room needed: No. Bariatric: No   Needed: No   Isolation: yes Infection: hx diarrhea past few days      Vital Signs:   Vitals:    08/05/21 1345 08/05/21 1400 08/05/21 1415 08/05/21 1445   BP: (!) 117/103 (!) 123/93 (!) 147/73 (!) 178/93   Pulse: 107 112 111 111   Resp:       Temp:       TempSrc:       SpO2: 100% 100% 100%        Cardiac Rhythm:  ,      Pain level:    Patient confused: No. Patient Falls Risk: Yes.   Elimination Status: Has voided   Patient Report - Initial Complaint: a 89 year old female with history of diabetes, ASCVD, and thyroid disease who presents via EMS for evaluation of shortness of breath and diarrhea. EMS reports that her home health nurse called because she had been deteriorating over the past few days, including increased shortness of breath and diarrhea. She is normally on oxygen at night, but today they placed her on 2L during the day when her oxygen sats went down to the 80s. EMS says her vitals have been stable, she does not have a fever, and she did not fall. The patient confirms that she has had diarrhea, but says she feels fine otherwise. She denies any chest pain, abdominal pain, blood in her stool, vomiting, or current difficulty breathing. Of note, she was recently admitted to the hospital on 7/01 and 7/11 for pneumonia due to COVID-19.  .   Focused Assessment:   Gastrointestinal Gastrointestinal - Gastrointestinal WDL: .WDL except; all  GI Signs/Symptoms: diarrhea (increased over past few days; denies any pain)     Respiratory Respiratory - Respiratory WDL: .WDL except; all   Rhythm/Pattern, Respiratory: shortness of breath; hypopnea (increased; 90% on RA) Expansion/Accessory Muscles/Retractions: no retractions; expansion symmetric; no use of accessory muscles  Mucous Membranes: dry  Cough Frequency: infrequent  Cough Type: good           Tests Performed: EKG, labs, CT chest   Abnormal Results: .  Labs Ordered and Resulted from Time of ED Arrival Up to the Time of Departure from the ED   D DIMER QUANTITATIVE - Abnormal; Notable for the following components:       Result Value    D-Dimer Quantitative 4.02 (*)     All other components within normal limits    Narrative:     This D-dimer assay is intended for use in conjunction with a clinical pretest probability assessment model to exclude pulmonary embolism (PE) and deep venous thrombosis (DVT) in outpatients suspected of PE or DVT. The cut-off value is 0.50 ug/mL FEU.   COMPREHENSIVE METABOLIC PANEL - Abnormal; Notable for the following components:    Creatinine 1.10 (*)     Glucose 240 (*)     Albumin 2.5 (*)     GFR Estimate 43 (*)     All other components within normal limits   BLOOD GAS VENOUS - Abnormal; Notable for the following components:    pCO2 Venous 53 (*)     pO2 Venous 18 (*)     All other components within normal limits   CBC WITH PLATELETS AND DIFFERENTIAL - Abnormal; Notable for the following components:    RBC Count 3.43 (*)     Hemoglobin 10.6 (*)     Hematocrit 33.7 (*)     Absolute Immature Granulocytes 0.1 (*)     All other components within normal limits   TROPONIN I - Normal   PROCALCITONIN   CARDIAC CONTINUOUS MONITORING   ORTHOSTATIC BLOOD PRESSURE AND PULSE   PERIPHERAL IV CATHETER   BLOOD CULTURE   BLOOD CULTURE   CBC WITH PLATELETS & DIFFERENTIAL    Narrative:     The following orders were created for panel order CBC with platelets differential.  Procedure                               Abnormality         Status                     ---------                               -----------         ------                      CBC with platelets and d...[[id number]]  Abnormal            Final result                 Please view results for these tests on the individual orders.     .  CT Chest Pulmonary Embolism w Contrast    (Results Pending)      Treatments provided: see ED meds below  Comments: using Slovenian  service  OBS brochure/video discussed/provided to patient:  N/A  ED Medications:   Medications   0.9% sodium chloride BOLUS (1,000 mLs Intravenous Incomplete 8/5/21 1341)     Drips infusing:  No  For the majority of the shift, the patient's behavior Green. Interventions performed were NA.    Sepsis treatment initiated: No     Patient tested for COVID 19 prior to admission: YES    ED Nurse Name/Phone Number: Ariadne Agarwal RN,   3:00 PM  RECEIVING UNIT ED HANDOFF REVIEW    Above ED Nurse Handoff Report was reviewed: Yes  Reviewed by: Jeanine Berg RN on August 5, 2021 at 6:03 PM

## 2021-08-05 NOTE — PHARMACY-ADMISSION MEDICATION HISTORY
Admission medication history interview status for this patient is complete. See [facility] admission navigator for allergy information, prior to admission medications and immunization status.     Medication history interview done, indicate source(s): pt's daughter  Medication history resources (including written lists, pill bottles, clinic record):None  Ph[facility]acy: [facility] [address] in [place]    Changes made to PTA medication list:  Added: lidocaine patch, fluocinonide cream  Changed: zolpidem prn to scheduled at bedtime  Reported as Not Taking: senna-docusate, memantine, Robitussin DM  Removed: none    Actions taken by pharmacist (provider contacted, etc):None     Additional medication history information:None    Medication reconciliation/reorder completed by provider prior to medication history?  N   (Y/N)     Prior to Admission medications    Medication Sig Last Dose Taking? Auth Provider   acetaminophen (TYLENOL) 325 MG tablet Take 650 mg by mouth every 8 hours as needed for pain   Yes Unknown, Entered By History   albuterol (PROAIR HFA/PROVENTIL HFA/VENTOLIN HFA) 108 (90 Base) MCG/ACT inhaler Inhale 2 puffs into the lungs every 6 hours as needed for shortness of breath / dyspnea or wheezing  Yes Rufino See MD   bisacodyl (DULCOLAX) 10 MG suppository Place 1 suppository rectally daily as needed.  Yes Kate Resendez MD   celecoxib (CELEBREX) 200 MG capsule Take 200 mg by mouth daily as needed for moderate pain  Yes Unknown, Entered By History   Cetirizine HCl (ZYRTEC ALLERGY PO) Take 10 mg by mouth daily as needed.  Yes Unknown, Entered By History   citalopram (CELEXA) 20 MG tablet Take 20 mg by mouth daily 8/3/2021 Yes Unknown, Entered By History   cyclobenzaprine (FLEXERIL) 10 MG tablet Take 10 mg by mouth daily 8/5/2021 Yes Unknown, Entered By History   donepezil (ARICEPT) 10 MG tablet Take 10 mg by mouth At Bedtime 8/4/2021 Yes Unknown, Entered By History   fluocinolone (SYNALAR) 0.025 %  cream Apply topically 2 times daily  Yes Unknown, Entered By History   fluocinolone (SYNALAR) 0.025 % cream Apply topically 2 times daily 8/4/2021 Yes Unknown, Entered By History   fluticasone (FLONASE) 50 MCG/ACT nasal spray Spray 2 sprays into both nostrils daily as needed for rhinitis or allergies  Yes Unknown, Entered By History   fluticasone (FLOVENT HFA) 220 MCG/ACT inhaler Inhale 2 puffs into the lungs 2 times daily 8/4/2021 at pm Yes Unknown, Entered By History   gabapentin (NEURONTIN) 300 MG capsule Take 600 mg by mouth 3 times daily 8/4/2021 Yes Unknown, Entered By History   levothyroxine (SYNTHROID/LEVOTHROID) 88 MCG tablet Take 88 mcg by mouth daily 8/5/2021 Yes Unknown, Entered By History   lisinopril (ZESTRIL) 40 MG tablet Take 40 mg by mouth daily 8/4/2021 Yes Unknown, Entered By History   metFORMIN (GLUCOPHAGE) 500 MG tablet Take 1 tablet by mouth 2 times daily (with meals). 8/5/2021 at am Yes Kate Resendez MD   metoprolol succinate ER (TOPROL-XL) 25 MG 24 hr tablet Take 25 mg by mouth daily 8/5/2021 Yes Unknown, Entered By History   mometasone furoate (ASMANEX HFA) 200 MCG/ACT inhaler Inhale 2 puffs into the lungs 2 times daily 8/5/2021 at am Yes Unknown, Entered By History   Olopatadine HCl (PATANOL OP) Place 1 drop into both eyes as needed  Yes Unknown, Entered By History   QUEtiapine (SEROQUEL XR) 50 MG TB24 24 hr tablet Take 50 mg by mouth nightly as needed  Yes Unknown, Entered By History   rivaroxaban ANTICOAGULANT (XARELTO) 10 MG TABS tablet Take 1 tablet (10 mg) by mouth daily (with dinner) 8/4/2021 Yes Alexander Espino MD   simvastatin (ZOCOR) 40 MG tablet Take 40 mg by mouth daily  8/5/2021 Yes Unknown, Entered By History   zolpidem (AMBIEN) 5 MG tablet Take 5 mg by mouth At Bedtime  8/4/2021 Yes Unknown, Entered By History   guaiFENesin-dextromethorphan (ROBITUSSIN DM) 100-10 MG/5ML syrup Take 10 mLs by mouth 4 times daily as needed for cough ran out of med  Alexander Espino MD    lidocaine (LIDODERM) 5 % patch 1-2 patch daily as needed patch not on  Unknown, Entered By History   memantine XR (NAMENDA XR) 28 MG 24 hr capsule Take 28 mg by mouth daily not taking  Unknown, Entered By History   senna-docusate (SENOKOT-S/PERICOLACE) 8.6-50 MG tablet Take 2 tablets by mouth 2 times daily as needed for constipation not taking  Unknown, Entered By History

## 2021-08-05 NOTE — ED NOTES
Pt yelling in room, Call light within reach. Pt inquiring about dtr. Writer informed pt dtr went home. Pt relaxed, repositioned, lights dimmed. Pt removed oxygen per self. O2 95% on RA.

## 2021-08-05 NOTE — H&P
Admitted: 08/05/2021    CHIEF COMPLAINT:  Shortness of breath, generalized weakness and diarrhea.    HISTORY OF PRESENT ILLNESS:  Nhi Perry is a 89-year-old female with past medical history significant for diabetes mellitus type 2, atherosclerotic cardiovascular disease, thyroid disease, who was recently diagnosed with COVID-19 pneumonia on 06/22/2021.  At that time, she developed bilateral pneumonia with hypoxia and was hospitalized from 07/01-07/05/2021.  A CT scan of the chest was consistent with COVID-19 pneumonia.  It also showed old granuloma and upper lobe pleural plaque.  The patient was also admitted to [facility] on from 07/11-07/16/2021 for bilateral pneumonia with acute hypoxia.  She was treated with IV antibiotic, Zithromax and Zosyn while in the hospital and she was discharged on Augmentin.  The patient started to have diarrhea while in the hospital which continued after discharge.  She was on Augmentin, and the diarrhea continued and presented to the emergency room.      Today, the patient came in with a complaint of worsening shortness of breath.  Last time she was discharged, she was on home oxygen at 2 liters at night.  Today, oxygen at 2 liters of oxygen was placed even during daytime and her saturation went down to 80%.  Otherwise, her vitals were stable.  She denied fever.  No nausea or vomiting.  She has no abdominal pain, but continued to have episodes of diarrhea.  There was no reported diarrhea after presentation to the Emergency Room.  Stool sample is still not collected here.      In the Emergency Room, she was evaluated. On arrival, she was saturating 90% on 3 liters of oxygen.  She was hemodynamically stable.  I discussed with the ED physician, Dr. Lopez, who stated he requested admission as the patient has been overall declining and for monitoring to treat her appropriately and possible placement.    PAST MEDICAL HISTORY:     1.  Hypertension.  2.   Hypothyroidism.  3.  Pulmonary tuberculosis.  4.  Hyponatremia.  5.  Pneumonia.  6.  COVID-19 pneumonia on 06/22/2021.  7.  Atherosclerotic cardiovascular disease.  8.  Hip fracture.    PAST SURGICAL HISTORY:     1.  Carotid endarterectomy.  2.  Cholecystectomy.  3.  Colonoscopy.  4.  Open reduction and internal fixation.    FAMILY HISTORY:  Reviewed, significant for cancer, Alzheimer's, hypertension, otherwise not significant.    SOCIAL HISTORY:  The patient speaks Singaporean and her daughter at bedside helped with interpreting per patient's request.  She does not smoke.  She does not drink alcohol.  She lives with her daughter.    HOME MEDICATIONS:  Reviewed, reconciled as in electronic medical record.    ALLERGIES:  NO KNOWN DRUG ALLERGIES.    PHYSICAL EXAMINATION:    GENERAL:  The patient is awake, alert, oriented, not in distress.  VITAL SIGNS:  Blood pressure 178/93, pulse rate 111, temperature 99.3, oxygen saturation 96% on 3 liters.  HEENT:  Pink, anicteric.  Extraocular muscle movement intact.  NECK:  Supple.  No JVD, no thyromegaly.  CHEST:  Good air entry bilaterally. Scattered crackles, noted prolonged expiratory phase.  No wheezing.  CARDIOVASCULAR:  S1, S2 regular.  No gallop or murmur.  ABDOMEN:  Soft, nontender, nondistended.  Positive bowel sounds.  EXTREMITIES:  No edema, cyanosis or clubbing.  NEUROLOGIC:  No focal neurologic deficit.  Cranial nerves grossly intact.  PSYCHIATRIC:  Normal mood and affect.  Keeps eye contact, responds to questions appropriately with the help of an .    ASSESSMENT AND PLAN:  This is a 89-year-old Singaporean-speaking female with past medical history significant for diabetes mellitus type 2, remote history of pulmonary tuberculosis, recently treated for bilateral COVID-19 pneumonia with subsequent suspected bacterial superinfection who, at this time was found to have worsening shortness of breath, generalized weakness, hypoxia and episodes of diarrhea and is being  admitted to the hospital for further evaluation and management.  1.  Acute on chronic hypoxic respiratory failure:  The patient has a history of COVID-19 pneumonia diagnosed on 06/22/2021, also in July she was in the hospital for pneumonia at that time treated with IV antibiotics, Zosyn and Zithromax and subsequently discharged on Augmentin and completed the course of treatment. At this time, patient came in with significant hypoxia.  She will be on oxygen.  I will start her on albuterol.  She is already on rivaroxaban for DVT prophylaxis, which will be continued.  I would not start her on any antibiotic other than vancomycin for possible C. diff.     2.  Diabetes mellitus type 2:  Her most recent hemoglobin A1c is 6.8.  She will be on sliding scale insulin.  We will hold metformin as she is subjected to coronary CT angiogram.  3.  Recent COVID-19 infection and bilateral pneumonia:  At this time, there are no issues related to that.  CT angio is negative for pulmonary embolism.  4.  Chronic medical conditions:  Stable.  5.  Diarrhea, which has been ongoing without nausea, fever or leukocytosis:  Stool for C. diff and stool for infectious panel are pending.  She will be on isolation to rule out Clostridium difficile. We will start her on oral vancomycin pending stool test.  She was exposed to antibiotics recently and increased risk of having C. difficile associated diarrhea.  She does not have a full-blown clinical picture for C. diff at this point, but we will make sure and will rule this out.  If stool for C. diff is negative, we will discontinue her vancomycin.      I discussed at length with patient and also with her daughter at bedside.  All their questions and concerns addressed.      In the event of cardiorespiratory arrest, patient's daughter wants her to be DNR/DNI.       was also consulted.  This patient will likely need placement. At baseline, the patient is mostly wheelchair  bound.      Alejandro Collier MD        D: 2021   T: 2021   MT: WOJCIECH    Name:     MORAIMA CUEVAS  MRN:      [id number]        Account:     [id number]   :      1925           Admitted:    2021       Document: [id number]

## 2021-08-06 ENCOUNTER — APPOINTMENT (OUTPATIENT)
Dept: CT IMAGING | Facility: CLINIC | Age: 86
DRG: 189 | End: 2021-08-06
Attending: INTERNAL MEDICINE
Payer: COMMERCIAL

## 2021-08-06 ENCOUNTER — APPOINTMENT (OUTPATIENT)
Dept: OCCUPATIONAL THERAPY | Facility: CLINIC | Age: 86
DRG: 189 | End: 2021-08-06
Attending: INTERNAL MEDICINE
Payer: COMMERCIAL

## 2021-08-06 LAB
ALBUMIN UR-MCNC: NEGATIVE MG/DL
APPEARANCE UR: CLEAR
ATRIAL RATE - MUSE: 111 BPM
BASOPHILS # BLD AUTO: 0.1 10E3/UL (ref 0–0.2)
BASOPHILS NFR BLD AUTO: 1 %
BILIRUB UR QL STRIP: NEGATIVE
COLOR UR AUTO: ABNORMAL
DIASTOLIC BLOOD PRESSURE - MUSE: NORMAL MMHG
EOSINOPHIL # BLD AUTO: 0.4 10E3/UL (ref 0–0.7)
EOSINOPHIL NFR BLD AUTO: 5 %
ERYTHROCYTE [DISTWIDTH] IN BLOOD BY AUTOMATED COUNT: 14.2 % (ref 10–15)
GLUCOSE BLDC GLUCOMTR-MCNC: 103 MG/DL (ref 70–99)
GLUCOSE BLDC GLUCOMTR-MCNC: 106 MG/DL (ref 70–99)
GLUCOSE BLDC GLUCOMTR-MCNC: 130 MG/DL (ref 70–99)
GLUCOSE BLDC GLUCOMTR-MCNC: 150 MG/DL (ref 70–99)
GLUCOSE BLDC GLUCOMTR-MCNC: 178 MG/DL (ref 70–99)
GLUCOSE UR STRIP-MCNC: 50 MG/DL
HCT VFR BLD AUTO: 29 % (ref 35–47)
HGB BLD-MCNC: 8.9 G/DL (ref 11.7–15.7)
HGB UR QL STRIP: NEGATIVE
IMM GRANULOCYTES # BLD: 0.2 10E3/UL
IMM GRANULOCYTES NFR BLD: 2 %
INTERPRETATION ECG - MUSE: NORMAL
KETONES UR STRIP-MCNC: NEGATIVE MG/DL
LACTATE SERPL-SCNC: 1.9 MMOL/L (ref 0.7–2)
LEUKOCYTE ESTERASE UR QL STRIP: NEGATIVE
LYMPHOCYTES # BLD AUTO: 1.6 10E3/UL (ref 0.8–5.3)
LYMPHOCYTES NFR BLD AUTO: 23 %
MCH RBC QN AUTO: 30.8 PG (ref 26.5–33)
MCHC RBC AUTO-ENTMCNC: 30.7 G/DL (ref 31.5–36.5)
MCV RBC AUTO: 100 FL (ref 78–100)
MONOCYTES # BLD AUTO: 1.1 10E3/UL (ref 0–1.3)
MONOCYTES NFR BLD AUTO: 16 %
NEUTROPHILS # BLD AUTO: 3.7 10E3/UL (ref 1.6–8.3)
NEUTROPHILS NFR BLD AUTO: 53 %
NITRATE UR QL: NEGATIVE
NRBC # BLD AUTO: 0 10E3/UL
NRBC BLD AUTO-RTO: 0 /100
P AXIS - MUSE: 70 DEGREES
PH UR STRIP: 5.5 [PH] (ref 5–7)
PLATELET # BLD AUTO: 182 10E3/UL (ref 150–450)
PR INTERVAL - MUSE: 160 MS
QRS DURATION - MUSE: 68 MS
QT - MUSE: 346 MS
QTC - MUSE: 470 MS
R AXIS - MUSE: 41 DEGREES
RBC # BLD AUTO: 2.89 10E6/UL (ref 3.8–5.2)
SP GR UR STRIP: 1.02 (ref 1–1.03)
SYSTOLIC BLOOD PRESSURE - MUSE: NORMAL MMHG
T AXIS - MUSE: 79 DEGREES
UROBILINOGEN UR STRIP-MCNC: NORMAL MG/DL
VENTRICULAR RATE- MUSE: 111 BPM
WBC # BLD AUTO: 7 10E3/UL (ref 4–11)

## 2021-08-06 PROCEDURE — 258N000003 HC RX IP 258 OP 636: Performed by: INTERNAL MEDICINE

## 2021-08-06 PROCEDURE — 36415 COLL VENOUS BLD VENIPUNCTURE: CPT | Performed by: INTERNAL MEDICINE

## 2021-08-06 PROCEDURE — 81003 URINALYSIS AUTO W/O SCOPE: CPT | Performed by: INTERNAL MEDICINE

## 2021-08-06 PROCEDURE — 83605 ASSAY OF LACTIC ACID: CPT | Performed by: INTERNAL MEDICINE

## 2021-08-06 PROCEDURE — 70450 CT HEAD/BRAIN W/O DYE: CPT

## 2021-08-06 PROCEDURE — 250N000013 HC RX MED GY IP 250 OP 250 PS 637: Performed by: INTERNAL MEDICINE

## 2021-08-06 PROCEDURE — 85025 COMPLETE CBC W/AUTO DIFF WBC: CPT | Performed by: INTERNAL MEDICINE

## 2021-08-06 PROCEDURE — 97165 OT EVAL LOW COMPLEX 30 MIN: CPT | Mod: GO

## 2021-08-06 PROCEDURE — 250N000011 HC RX IP 250 OP 636: Performed by: INTERNAL MEDICINE

## 2021-08-06 PROCEDURE — 99233 SBSQ HOSP IP/OBS HIGH 50: CPT | Performed by: INTERNAL MEDICINE

## 2021-08-06 PROCEDURE — 120N000001 HC R&B MED SURG/OB

## 2021-08-06 RX ORDER — HALOPERIDOL 5 MG/ML
1 INJECTION INTRAMUSCULAR EVERY 6 HOURS PRN
Status: DISCONTINUED | OUTPATIENT
Start: 2021-08-06 | End: 2021-08-07

## 2021-08-06 RX ORDER — LORAZEPAM 2 MG/ML
0.25 INJECTION INTRAMUSCULAR EVERY 4 HOURS PRN
Status: DISCONTINUED | OUTPATIENT
Start: 2021-08-06 | End: 2021-08-10 | Stop reason: HOSPADM

## 2021-08-06 RX ORDER — LISINOPRIL 20 MG/1
20 TABLET ORAL DAILY
Status: DISCONTINUED | OUTPATIENT
Start: 2021-08-07 | End: 2021-08-10 | Stop reason: HOSPADM

## 2021-08-06 RX ADMIN — METOPROLOL SUCCINATE 25 MG: 25 TABLET, EXTENDED RELEASE ORAL at 08:48

## 2021-08-06 RX ADMIN — SODIUM CHLORIDE: 9 INJECTION, SOLUTION INTRAVENOUS at 01:48

## 2021-08-06 RX ADMIN — OLOPATADINE HYDROCHLORIDE 1 DROP: 1 SOLUTION OPHTHALMIC at 08:54

## 2021-08-06 RX ADMIN — LORAZEPAM 0.25 MG: 2 INJECTION INTRAMUSCULAR; INTRAVENOUS at 13:17

## 2021-08-06 RX ADMIN — HYDRALAZINE HYDROCHLORIDE 12.5 MG: 25 TABLET, FILM COATED ORAL at 08:48

## 2021-08-06 RX ADMIN — SIMVASTATIN 40 MG: 40 TABLET, FILM COATED ORAL at 08:48

## 2021-08-06 RX ADMIN — LEVOTHYROXINE SODIUM 88 MCG: 0.09 TABLET ORAL at 08:48

## 2021-08-06 RX ADMIN — CYCLOBENZAPRINE HYDROCHLORIDE 10 MG: 5 TABLET, FILM COATED ORAL at 08:48

## 2021-08-06 RX ADMIN — HALOPERIDOL 1 MG: 0.5 TABLET ORAL at 04:03

## 2021-08-06 RX ADMIN — HALOPERIDOL LACTATE 1 MG: 5 INJECTION, SOLUTION INTRAMUSCULAR at 12:36

## 2021-08-06 RX ADMIN — LISINOPRIL 40 MG: 40 TABLET ORAL at 08:48

## 2021-08-06 RX ADMIN — GABAPENTIN 600 MG: 300 CAPSULE ORAL at 08:48

## 2021-08-06 RX ADMIN — VANCOMYCIN HYDROCHLORIDE 125 MG: 125 CAPSULE ORAL at 08:48

## 2021-08-06 RX ADMIN — SODIUM CHLORIDE 250 ML: 9 INJECTION, SOLUTION INTRAVENOUS at 01:47

## 2021-08-06 RX ADMIN — CITALOPRAM HYDROBROMIDE 20 MG: 20 TABLET ORAL at 08:48

## 2021-08-06 RX ADMIN — FAMOTIDINE 20 MG: 20 TABLET ORAL at 08:49

## 2021-08-06 ASSESSMENT — ACTIVITIES OF DAILY LIVING (ADL)
ADLS_ACUITY_SCORE: 27
ADLS_ACUITY_SCORE: 27
ADLS_ACUITY_SCORE: 29
ADLS_ACUITY_SCORE: 27

## 2021-08-06 NOTE — PROGRESS NOTES
08/06/21 1000   Living Environment   People in home child(zeenat), adult   Current Living Arrangements house   Home Accessibility stairs to enter home   Number of Stairs, Main Entrance 2   Self-Care   Usual Activity Tolerance poor   Current Activity Tolerance poor   Disability/Function   Hearing Difficulty or Deaf no   Wear Glasses or Blind yes   Walking or Climbing Stairs Difficulty yes   Dressing/Bathing bathing difficulty, dependent;dressing difficulty, dependent   Toileting issues yes   Toileting Assistance toileting difficulty, dependent   Fall history within last six months yes   General Information   Onset of Illness/Injury or Date of Surgery 08/04/21   Referring Physician Alejandro Collier MD   Patient/Family Therapy Goal Statement (OT) none stated at time of eval    Additional Occupational Profile Info/Pertinent History of Current Problem Nhi Perry is a 89-year-old female with past medical history significant for diabetes mellitus type 2, atherosclerotic cardiovascular disease, thyroid disease, who was recently diagnosed with COVID-19 pneumonia on 06/22/2021.   Performance Patterns (Routines, Roles, Habits) typically has assistance for all cares    Existing Precautions/Restrictions fall   General Observations and Info deconditioned from baseline    Strength Comprehensive (MMT)   General Manual Muscle Testing (MMT) Assessment upper extremity strength deficits identified   Comment, General Manual Muscle Testing (MMT) Assessment generalized weakness in BUE   Bed Mobility   Bed Mobility supine-sit;rolling right   Rolling Right [place] (Bed Mobility) verbal cues;nonverbal cues (demo/gesture);moderate assist (50% patient effort)   Supine-Sit [place] (Bed Mobility) verbal cues;nonverbal cues (demo/gesture);moderate assist (50% patient effort)   Assistive Device (Bed Mobility) bed rails   Comment (Bed Mobility) BP to 93/42 in sitting, returned to supine    Clinical Impression   Criteria for  Skilled Therapeutic Interventions Met (OT) yes;meets criteria;skilled treatment is necessary   OT Diagnosis declined function    OT Problem List-Impairments impacting ADL problems related to;activity tolerance impaired;cognition;mobility;range of motion (ROM);strength;pain;balance   Assessment of Occupational Performance 3-5 Performance Deficits   Identified Performance Deficits bathing, dressing, grooming, home mgmt, self cares    Planned Therapy Interventions (OT) ADL retraining;bed mobility training;strengthening;ROM   Clinical Decision Making Complexity (OT) low complexity   Therapy Frequency (OT) Daily   Predicted Duration of Therapy 5 days   Risk & Benefits of therapy have been explained evaluation/treatment results reviewed;care plan/treatment goals reviewed;risks/benefits reviewed;current/potential barriers reviewed;participants voiced agreement with care plan;participants included;patient;daughter   OT Discharge Planning    OT Discharge Recommendation (DC Rec) Transitional Care Facility   OT Rationale for DC Rec below baseline function with mobility, transfers and self cares and will progress with continued rehab s/p IP. if declining TCU, recommending home OT/PT/RN services   Total Evaluation Time (Minutes)   Total Evaluation Time (Minutes) 20

## 2021-08-06 NOTE — UTILIZATION REVIEW
Admission Status; Secondary Review Determination       Under the authority of the Utilization Management Committee, the utilization review process indicated a secondary review on the above patient. The review outcome is based on review of the medical records, discussions with staff, and applying clinical experience noted on the date of the review.     (x) Inpatient Status Appropriate - This patient's medical care is consistent with medical management for inpatient care and reasonable inpatient medical practice.     RATIONALE FOR DETERMINATION   89-year-old Peruvian-speaking female with past medical history significant for diabetes mellitus type 2, remote history of pulmonary tuberculosis, recently treated for bilateral COVID-19 pneumonia with subsequent suspected bacterial superinfection who, at this time was found to have worsening shortness of breath, generalized weakness, hypoxia and episodes of diarrhea and is being admitted to the hospital for further evaluation and management.  Time of this review patient was hypotensive blood pressure low 90s alert and oriented to self only having expiratory wheezes requiring Haldol and Ativan for delirium refusing all his pills this afternoon. The expected length of stay at the time of admission was more than 2 nights because of the severity of illness, intensity of service provided, and risk for adverse outcome. Inpatient admission is appropriate.     This document was produced using voice recognition software       The information on this document is developed by the utilization review team in order for the business office to ensure compliance. This only denotes the appropriateness of proper admission status and does not reflect the quality of care rendered.   The definitions of Inpatient Status and Observation Status used in making the determination above are those provided in the CMS Coverage Manual, Chapter 1 and Chapter 6, section 70.4.   Sincerely,   JUSTIN BECKHAM,  MD   System Medical Director   Utilization Management   [facility].

## 2021-08-06 NOTE — PLAN OF CARE
South African speaking,  service used. Enteric Isolation to R/O C. Diff.  Disoriented to time and situation. Pt. very confused, anxious, agitated, tearing and uncorperative. Trying to get out of the bed, pulling her IV and tele box. PRN haldol is available. Very unsteady gait, repositioned with lift and assist of 2. BP elevated.  Tempeture maxed at 101.1, PRN Tylenol given. ON RA, Sating mid 90s. Desats with activities. ALEXANDRA and SOB at times. Tele: SR/ST, -115s. Lactic acid 3.8, 500ml bolus currently infusing. Blanchable redness with non blanchable redness areas to coccyx,  small scab, mepilex in placed. Redness to under breasts and perineum, barrier cream applied. Pt. Is on IVF infusing 75ml/hr. Stool sample need to be collect to check for C. diff and infectious panel. Will continue to monitor.

## 2021-08-06 NOTE — PLAN OF CARE
VSS.  AOX4.  Breathing is unlabored and pt denies SOB.  Regular diet.  Assist of 2 with lift.  Pt denies pain.    Pt low urine output; bladder scan showed 515 ml.  Staff encouraged pt to urinate and 250 out plus urine collection for lab.      Pt BP dropped significantly during shift; MD paged and additional bolus of 250 ml given. Lactic 3.8, and trended down to 1.9.    Stool sample still needs to be collected; no stool this shift.    Tele: SA with PAC's

## 2021-08-06 NOTE — PLAN OF CARE
Pertinent Assessments: Sleepy this shift, arouses to voice but falls right back asleep. No evidence of pain. LS diminished. 1.5L via NC. Tele SR. VSS.   Major Shift Events: Head CT completed - see results.   Treatment Plan: No abx at this time. Wean oxygen as able. PT/OT evals. Likely TCU at discharge.

## 2021-08-06 NOTE — CONSULTS
Care Management Initial Consult    General Information  Assessment completed with: Shana Edwards  Type of CM/SW Visit: Offer D/C Planning    Primary Care Provider verified and updated as needed: Yes   Readmission within the last 30 days: no previous admission in last 30 days      Reason for Consult: care coordination/care conference, discharge planning         Communication Assessment  Patient's communication style: spoken language (non-English)    Hearing Difficulty or Deaf: no   Wear Glasses or Blind: yes    Cognitive  Cognitive/Neuro/Behavioral: .WDL except  Level of Consciousness: confused, lethargic  Arousal Level: arouses to voice  Orientation: disoriented to, place, time, situation  Mood/Behavior: agitated, anxious  Best Language: 0 - No aphasia  Speech: spontaneous, clear    Living Environment:   People in home:     Daughter and Son in Law  Current living Arrangements: house      Able to return to prior arrangements: yes       Family/Social Support:  Care provided by: child(zeenat)  Provides care for: no one, unable/limited ability to care for self     Children          Description of Support System: Supportive, Involved         Current Resources:   Patient receiving home care services: Yes  Skilled Home Care Services: Skilled Nursing, Physicial Therapy, Occupational Therapy    Lifestyle & Psychosocial Needs:  Social Determinants of Health     Tobacco Use: Low Risk      Smoking Tobacco Use: Never Smoker     Smokeless Tobacco Use: Never Used   Alcohol Use:      Frequency of Alcohol Consumption:      Average Number of Drinks:      Frequency of Binge Drinking:    Financial Resource Strain:      Difficulty of Paying Living Expenses:    Food Insecurity:      Worried About Running Out of Food in the Last Year:      Ran Out of Food in the Last Year:    Transportation Needs:      Lack of Transportation (Medical):      Lack of Transportation (Non-Medical):    Physical Activity:      Days of Exercise per Week:       Minutes of Exercise per Session:    Stress:      Feeling of Stress :    Social Connections:      Frequency of Communication with Friends and Family:      Frequency of Social Gatherings with Friends and Family:      Attends Anabaptist Services:      Active Member of Clubs or Organizations:      Attends Club or Organization Meetings:      Marital Status:    Intimate Partner Violence:      Fear of Current or Ex-Partner:      Emotionally Abused:      Physically Abused:      Sexually Abused:    Depression:      PHQ-2 Score:    Housing Stability:      Unable to Pay for Housing in the Last Year:      Number of Places Lived in the Last Year:      Unstable Housing in the Last Year:        Care Management Follow Up    Length of Stay (days): 1    Expected Discharge Date: 08/08/2021     Concerns to be Addressed: discharge planning, care coordination/care conferences     Patient plan of care discussed at interdisciplinary rounds: Yes    Anticipated Discharge Disposition:  (TCU vs Home w/ Home Care)     Anticipated Discharge Services:  Home Care  Anticipated Discharge DME: None    Patient/family educated on Medicare website which has current facility and service quality ratings: no  Education Provided on the Discharge Plan:  Yes  Patient/Family in Agreement with the Plan: other (see comments) (TBD)    Referrals Placed by CM/SW:  None  Private pay costs discussed: Not applicable    Additional Information:  CM consulted and following for discharge planning. Spoke with daughter Shana over the phone. Verified that patient lives and receives care from the daughter at home.   Patient is currently receiving home care through [facility]. RN/PT/OT. P: [phone] Fax [phone].    Discussed current recommendations of TCU. Shana requests CM to follow up closer to discharge. Shana will consider TCU if Shana feels that patient is still too weak to go home. Shana is hopeful that patient will improve to be able to return home with  resumption of home care.   Shana stated that has all necessary equipment to care for her mother at home.     CM will continue to follow for discharge planning.       MISSAEL Rosas RN Case Manager  Inpatient Care Coordination  [facility]   [phone]

## 2021-08-06 NOTE — PLAN OF CARE
VSS except slight hypotension of 93/43, pt weaned to 1.5 LPM NC, A/O to self only, LS dim w/ expiratory wheezing, denies pain, ALEXANDRA, IVF discontinued, haldol and ativan given once for restlessness and agitation, pt refused oral pills this afternoon, discharge timeline TBD.

## 2021-08-06 NOTE — PROVIDER NOTIFICATION
Pt now restless, refusing to take any oral medications, can I get IV haldol or something similar? thank you

## 2021-08-06 NOTE — PROVIDER NOTIFICATION
MD paged: Pt. very confused, anxious, agitated, tearing and uncorperative. Trying to get out of the bed, pulling her IV and tele box. Please advise and thanks.      MD ordered PRN haldol.     DATE:  8/5/2021   TIME OF RECEIPT FROM LAB:  2215PM   LAB TEST:  Lactic acid  LAB VALUE:  3.8  RESULTS GIVEN WITH READ-BACK TO (PROVIDER):  [facility]  TIME LAB VALUE REPORTED TO PROVIDER:   22:25PM    500ml bolus given per MD.

## 2021-08-06 NOTE — PROGRESS NOTES
[facility]  Hospitalist Progress Note  Alejandro Collier MD 08/06/2021    Reason for Stay (Diagnosis): Generalized weakness, confusion.         Assessment and Plan:      Summary of Stay: Nhi Perry a 89-year-old Bahamian-speaking female with PMH significant for DM II, remote history of pulmonary tuberculosis, recently treated for bilateral COVID-19 pneumonia with subsequent suspected bacterial superinfection, this time was found to have worsening shortness of breath, generalized weakness, hypoxia and episodes of diarrhea and presented to ED and admitted on 08/05/2021for further evaluation and management.    1.  Acute on chronic hypoxic respiratory failure:  -She had COVID-19 pneumonia diagnosed on 06/22/2021.  -She was admitted to Counts include [facility] beds at the [facility] and treated for bacterial pneumonia with IV  Zosyn and Zithromax and subsequently discharged on Augmentin and completed the course of treatment.   -She continued to have hypoxia.  - No fever.  -She had elevated LA to 3.8 last night, suspected due to bronchodilators, normalized with gentle hydration.  -Procal is negative.  -Continue oxygen and try to wean her off, bronchodilators, no antibiotics for now.  -She is already on rivaroxaban for DVT prophylaxis.  -CT  chest PE protocol negative, showed resolving multilobar opacities.      2.  Diabetes mellitus type 2:    -Her most recent hemoglobin A1c is 6.8.   -Continue sliding scale insulin.    -Continue to hold Metformin as she is subjected to CT PE protocol.  -She also developed lactic acidosis last night.    3.  Recent COVID-19 infection and bilateral pneumonia.  - At this time, there are no issues related to that, the infiltrates are improving..   -CT angio is negative for pulmonary embolism.    4.  Reported diarrhea.  -She was on antibiotics recently, ruling out C.diff was consider and ordered.   -Patient has no BM since admission, no leukocytosis, no abdominal pain.  -Discontinued enteric isolation, oral  vancomycin and stool studies.    5.  Encephalopathy, suspected metabolic.  -She was confused earlier and slow to respond to questions, this afternoon, she was anxious and      also agitated.  -There was no focal deficit. She was able to over all her extremities.  -She is just slower overall and weaker and more confused.  -Reorient the patient.  -She speaks Brazilian/Aremenian. Her daughter helping with interpreting.  -Will order CT head to r/o any bleeding, recent COVID19 on AC.    6. Lactic acidosis:  - This is due to bronchodilators, no sign of systemic infection.  -LA is better, but her mental status is worse today.  -Needs close monitoring, risk of deterioration.    DVT Prophylaxis: Xarelto.   Code Status: DNR / DNI  Discharge Dispo: Likely TCU pending PT evaluation  Estimated Disch Date / # of Days until Disch: 2 days.        Interval History (Subjective):      Patient seen and examined, admitted yesterday, today weaker and more confused, no cough, continued to require oxygen, episode of agitation this afternoon.                  Physical Exam:      Last Vital Signs:  BP 93/43 (BP Location: Right arm)   Pulse 82   Temp 97.2  F (36.2  C) (Axillary)   Resp 16   SpO2 99%     I/O last 3 completed shifts:  In: 223 [P.O.:220; I.V.:3]  Out: 1300 [Urine:1300]  Wt Readings from Last 1 Encounters:   07/15/21 68 kg (149 lb 14.6 oz)     Current Facility-Administered Medications   Medication     acetaminophen (TYLENOL) tablet 650 mg     albuterol (PROAIR HFA/PROVENTIL HFA/VENTOLIN HFA) 108 (90 Base) MCG/ACT inhaler 2 puff     citalopram (celeXA) tablet 20 mg     cyclobenzaprine (FLEXERIL) tablet 10 mg     glucose gel 15-30 g    Or     dextrose 50 % injection 25-50 mL    Or     glucagon injection 1 mg     donepezil (ARICEPT) tablet 10 mg     famotidine (PEPCID) tablet 20 mg     fluticasone (ARNUITY ELLIPTA) 200 MCG/ACT inhaler 1 puff     fluticasone (FLONASE) 50 MCG/ACT spray 2 spray     gabapentin (NEURONTIN) capsule 600  mg     guaiFENesin-dextromethorphan (ROBITUSSIN DM) 100-10 MG/5ML syrup 10 mL     haloperidol (HALDOL) tablet 0.5-1 mg     haloperidol lactate (HALDOL) injection 1 mg     insulin aspart (NovoLOG) injection (RAPID ACTING)     insulin aspart (NovoLOG) injection (RAPID ACTING)     levothyroxine (SYNTHROID/LEVOTHROID) tablet 88 mcg     Lidocaine (LIDOCARE) 4 % Patch 1-2 patch     lidocaine (LMX4) cream     lidocaine 1 % 0.1-1 mL     [START ON 8/7/2021] lisinopril (ZESTRIL) tablet 20 mg     LORazepam (ATIVAN) injection 0.25 mg     melatonin tablet 1 mg     metoprolol succinate ER (TOPROL-XL) 24 hr tablet 25 mg     olopatadine (PATANOL) 0.1 % ophthalmic solution 1 drop     ondansetron (ZOFRAN-ODT) ODT tab 4 mg    Or     ondansetron (ZOFRAN) injection 4 mg     Patient is already receiving anticoagulation with heparin, enoxaparin (LOVENOX), warfarin (COUMADIN)  or other anticoagulant medication     prochlorperazine (COMPAZINE) injection 5 mg    Or     prochlorperazine (COMPAZINE) tablet 5 mg    Or     prochlorperazine (COMPAZINE) suppository 12.5 mg     QUEtiapine (SEROquel XR) 24 hr tablet 50 mg     rivaroxaban ANTICOAGULANT (XARELTO) tablet 10 mg     simvastatin (ZOCOR) tablet 40 mg     sodium chloride (PF) 0.9% PF flush 3 mL     sodium chloride (PF) 0.9% PF flush 3 mL     zolpidem (AMBIEN) tablet 5 mg       Constitutional: Awake, alert, cooperative, no apparent distress   Respiratory: Clear to auscultation bilaterally, no crackles or wheezing   Cardiovascular: Regular rate and rhythm, normal S1 and S2, and no murmur noted   Abdomen: Normal bowel sounds, soft, non-distended, non-tender   Skin: No rashes, no cyanosis, dry to touch   Neuro: Alert and oriented x2, no weakness, numbness, +memory loss   Extremities: No edema, normal range of motion   Other(s):HEENT Pink,unicteric, moist mucosa.       All other systems: Negative          Medications:      All current medications were reviewed with changes reflected in problem  list.         Data:      All new lab and imaging data was reviewed.   Labs:  Recent Labs   Lab 08/06/21  1427 08/06/21  0845 08/06/21  0157 08/05/21  1334   NA  --   --   --  137   POTASSIUM  --   --   --  4.3   CHLORIDE  --   --   --  104   CO2  --   --   --  27   ANIONGAP  --   --   --  6   * 103* 150* 240*   BUN  --   --   --  12   CR  --   --   --  1.10*   GFRESTIMATED  --   --   --  43*   CHRIS  --   --   --  8.9     Recent Labs   Lab 08/06/21  0727 08/05/21  1334   WBC 7.0 8.7   HGB 8.9* 10.6*   HCT 29.0* 33.7*    98    241     Recent Labs   Lab 08/06/21  1427 08/06/21  0845 08/06/21  0157 08/05/21  2149 08/05/21  1334   * 103* 150* 189* 240*      Imaging:   Results for orders placed or performed during the hospital encounter of 08/05/21   CT Chest Pulmonary Embolism w Contrast    Narrative    CT CHEST PULMONARY EMBOLISM W CONTRAST 8/5/2021 3:17 PM    CLINICAL HISTORY: PE suspected, low/intermediate prob, positive  D-dimer, hypertension, type 2 diabetes mellitus, acute on chronic  respiratory failure with hypoxia, increased shortness of breath  TECHNIQUE: CT angiogram chest during arterial phase injection IV  contrast. 2D and 3D MIP reconstructions were performed by the CT  technologist. Dose reduction techniques were used.     CONTRAST: 55mL Isovue-370    COMPARISON: CT chest exams 7/12/2021 and 7/1/2021    FINDINGS:  ANGIOGRAM CHEST: Pulmonary arteries are normal caliber and negative  for pulmonary emboli. Thoracic aorta is negative for dissection. No CT  evidence of right heart strain.    LUNGS AND PLEURA: The central airways are clear. Mild bronchiectasis.  Mild to moderate improvement of the previously seen diffuse bilateral  multilobar pulmonary airspace opacities. 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 No pleural effusion.    MEDIASTINUM/AXILLAE: No thoracic adenopathy. Normal  heart size and no  pericardial effusion.    UPPER ABDOMEN: Postcholecystectomy.    MUSCULOSKELETAL: Osseous demineralization. Spinal degenerative  changes.      Impression    IMPRESSION:  1.  No pulmonary artery embolism or thoracic aortic dissection.  2.  Mild to moderate diffuse bilateral multilobar pulmonary airspace  opacities which are mild to moderately improved since the prior CT  from 7/12/2021, likely reflecting resolving pneumonia. No pleural  effusion.    GERSON MORRISON MD         SYSTEM ID:  [id number]

## 2021-08-06 NOTE — PHARMACY-ADMISSION MEDICATION HISTORY
Contacted pt's daughter to clarify PTA inhalers. Pt has both asmanex (mometasone) + flovent (fluticasone) on home med list - duplicate corticosteroid therapy. Pt's daughter stated that she uses flovent at home. She had an asmanex at one point sent home from a previous hospitalization (maybe a substitution) but is not using both. Updated PTA med list and removed inpt asmanex order.

## 2021-08-07 LAB
ANION GAP SERPL CALCULATED.3IONS-SCNC: 8 MMOL/L (ref 3–14)
BUN SERPL-MCNC: 11 MG/DL (ref 7–30)
CALCIUM SERPL-MCNC: 8.2 MG/DL (ref 8.5–10.1)
CHLORIDE BLD-SCNC: 108 MMOL/L (ref 94–109)
CO2 SERPL-SCNC: 23 MMOL/L (ref 20–32)
CREAT SERPL-MCNC: 0.91 MG/DL (ref 0.52–1.04)
GFR SERPL CREATININE-BSD FRML MDRD: 54 ML/MIN/1.73M2
GLUCOSE BLD-MCNC: 169 MG/DL (ref 70–99)
GLUCOSE BLDC GLUCOMTR-MCNC: 122 MG/DL (ref 70–99)
GLUCOSE BLDC GLUCOMTR-MCNC: 129 MG/DL (ref 70–99)
GLUCOSE BLDC GLUCOMTR-MCNC: 141 MG/DL (ref 70–99)
GLUCOSE BLDC GLUCOMTR-MCNC: 141 MG/DL (ref 70–99)
GLUCOSE BLDC GLUCOMTR-MCNC: 170 MG/DL (ref 70–99)
POTASSIUM BLD-SCNC: 4.5 MMOL/L (ref 3.4–5.3)
SODIUM SERPL-SCNC: 139 MMOL/L (ref 133–144)

## 2021-08-07 PROCEDURE — 36415 COLL VENOUS BLD VENIPUNCTURE: CPT | Performed by: INTERNAL MEDICINE

## 2021-08-07 PROCEDURE — 250N000011 HC RX IP 250 OP 636: Performed by: INTERNAL MEDICINE

## 2021-08-07 PROCEDURE — 99232 SBSQ HOSP IP/OBS MODERATE 35: CPT | Performed by: INTERNAL MEDICINE

## 2021-08-07 PROCEDURE — 250N000013 HC RX MED GY IP 250 OP 250 PS 637: Performed by: INTERNAL MEDICINE

## 2021-08-07 PROCEDURE — 80048 BASIC METABOLIC PNL TOTAL CA: CPT | Performed by: INTERNAL MEDICINE

## 2021-08-07 PROCEDURE — 120N000001 HC R&B MED SURG/OB

## 2021-08-07 RX ORDER — MEMANTINE HYDROCHLORIDE 28 MG/1
28 CAPSULE, EXTENDED RELEASE ORAL DAILY
Status: DISCONTINUED | OUTPATIENT
Start: 2021-08-07 | End: 2021-08-07

## 2021-08-07 RX ORDER — FAMOTIDINE 20 MG/1
20 TABLET, FILM COATED ORAL DAILY
Status: DISCONTINUED | OUTPATIENT
Start: 2021-08-08 | End: 2021-08-10 | Stop reason: HOSPADM

## 2021-08-07 RX ORDER — CETIRIZINE HYDROCHLORIDE 10 MG/1
10 TABLET ORAL
Status: DISCONTINUED | OUTPATIENT
Start: 2021-08-07 | End: 2021-08-10 | Stop reason: HOSPADM

## 2021-08-07 RX ADMIN — OLOPATADINE HYDROCHLORIDE 1 DROP: 1 SOLUTION OPHTHALMIC at 22:07

## 2021-08-07 RX ADMIN — OLOPATADINE HYDROCHLORIDE 1 DROP: 1 SOLUTION OPHTHALMIC at 08:10

## 2021-08-07 RX ADMIN — METFORMIN HYDROCHLORIDE 500 MG: 500 TABLET, FILM COATED ORAL at 17:30

## 2021-08-07 RX ADMIN — LEVOTHYROXINE SODIUM 88 MCG: 0.09 TABLET ORAL at 08:00

## 2021-08-07 RX ADMIN — GABAPENTIN 600 MG: 300 CAPSULE ORAL at 22:02

## 2021-08-07 RX ADMIN — LORAZEPAM 0.25 MG: 2 INJECTION INTRAMUSCULAR; INTRAVENOUS at 01:15

## 2021-08-07 RX ADMIN — QUETIAPINE FUMARATE 50 MG: 50 TABLET, EXTENDED RELEASE ORAL at 22:02

## 2021-08-07 RX ADMIN — CITALOPRAM HYDROBROMIDE 20 MG: 20 TABLET ORAL at 08:01

## 2021-08-07 RX ADMIN — CYCLOBENZAPRINE HYDROCHLORIDE 10 MG: 5 TABLET, FILM COATED ORAL at 08:01

## 2021-08-07 RX ADMIN — DONEPEZIL HYDROCHLORIDE 10 MG: 10 TABLET ORAL at 22:02

## 2021-08-07 RX ADMIN — HALOPERIDOL LACTATE 1 MG: 5 INJECTION, SOLUTION INTRAMUSCULAR at 00:51

## 2021-08-07 RX ADMIN — RIVAROXABAN 10 MG: 10 TABLET, FILM COATED ORAL at 17:30

## 2021-08-07 RX ADMIN — METOPROLOL SUCCINATE 25 MG: 25 TABLET, EXTENDED RELEASE ORAL at 08:01

## 2021-08-07 RX ADMIN — GABAPENTIN 600 MG: 300 CAPSULE ORAL at 15:38

## 2021-08-07 RX ADMIN — ZOLPIDEM TARTRATE 5 MG: 5 TABLET, COATED ORAL at 22:02

## 2021-08-07 RX ADMIN — LISINOPRIL 20 MG: 20 TABLET ORAL at 08:01

## 2021-08-07 RX ADMIN — SIMVASTATIN 40 MG: 40 TABLET, FILM COATED ORAL at 08:00

## 2021-08-07 RX ADMIN — GABAPENTIN 300 MG: 300 CAPSULE ORAL at 08:00

## 2021-08-07 ASSESSMENT — ACTIVITIES OF DAILY LIVING (ADL)
ADLS_ACUITY_SCORE: 29
ADLS_ACUITY_SCORE: 31
ADLS_ACUITY_SCORE: 29
ADLS_ACUITY_SCORE: 31
ADLS_ACUITY_SCORE: 29
ADLS_ACUITY_SCORE: 31

## 2021-08-07 NOTE — PROGRESS NOTES
[facility]  Hospitalist Progress Note  Alejandro Collier MD 08/07/2021    Reason for Stay (Diagnosis): Generalized weakness, confusion.         Assessment and Plan:      Summary of Stay: Nhi Perry a 89-year-old Grenadian-speaking female with PMH significant for DM II, remote history of pulmonary tuberculosis, recently treated for bilateral COVID-19 pneumonia with subsequent suspected bacterial superinfection, this time was found to have worsening shortness of breath, generalized weakness, hypoxia and episodes of diarrhea and presented to ED and admitted on 08/05/2021for further evaluation and management.    1.  Acute on chronic hypoxic respiratory failure:  -She had COVID-19 pneumonia diagnosed on 06/22/2021.  -She was admitted to [facility] and treated for bacterial pneumonia with IV  Zosyn and Zithromax and subsequently discharged on Augmentin and completed the course of treatment.   -She is off oxygen this afternoon, saturating 95.  - She has no fever, no cough.  -She had elevated LA to 3.8 on admission, suspected due to bronchodilators, normalized with gentle hydration.  -Procal is negative.  -Monitor pulse ox, weaned off oxygen.  -She is already on rivaroxaban for DVT prophylaxis.  -CT  chest PE protocol negative, showed resolving multilobar opacities.      2.  Diabetes mellitus type 2:    -Her most recent hemoglobin A1c is 6.8.   -Continue sliding scale insulin.    -Continue to hold Metformin for now, may restart it tomorrow.    3.  Recent COVID-19 infection and bilateral pneumonia.  - At this time, there are no issues, no significant hypoxia, the lung infiltrates are improving..   -CT PE protocol negative for pulmonary embolism.    4.  Reported diarrhea.  -She was on antibiotics recently, stool for C. difficile was ordered, but patient denied of any more diarrhea after admission .  -C. difficile test cancelled.   -Patient has no more diarrhea,, no leukocytosis, no abdominal  pain.  -Discontinued enteric isolation, oral vancomycin and stool studies.    5.  Encephalopathy, suspected metabolic.  -She was confused on 08/06. She was given a haldol and Ativan. She became sedated.  -Today she is at her baseline mental status.  -Discontinued Ativan and Haldol.  -Continue Seroquel as needed as needed.  -CT scan of the head did not show any acute abnormality.  -There was no focal deficit. She was able to over all her extremities.  -She is just slower overall and weaker and more confused.  -She speaks Georgian/Aremenian. Her daughter helping with interpreting.    6. Lactic acidosis:  -This is due to bronchodilators, no sign of systemic infection.  -LA is better, but her mental status is worse today.  -Needs close monitoring, risk of deterioration.    7. Anemia.  -This likely due to hemodilution. All cell lines decreased.  -Hemoglobin is 8.9.   -No sign of bleeding.  -Check CBC as needed.    DVT Prophylaxis: Xarelto.   Code Status: DNR / DNI  Discharge Dispo: Initial plan was to discharge  her to TCU, today her daughter stated she wanted her mother to discharge back home as other family members are coming out of state to visit her. Patient can be discharged with services upon discharge.  Estimated Disch Date / # of Days until Disch:1-2 days if she continues to improve, no more agitation.  I discussed with her daughter at bedside. All her questions and concerns addressed.        Interval History (Subjective):      Patient seen and examined, feels better today, comfortable and cooperative, daughter at bedside, no cough, use of oxygen, no agitation.                  Physical Exam:      Last Vital Signs:  BP (!) 152/61 (BP Location: Left arm)   Pulse 93   Temp 99.8  F (37.7  C) (Axillary)   Resp 18   SpO2 94%     I/O last 3 completed shifts:  In: 343 [P.O.:340; I.V.:3]  Out: 450 [Urine:450]  Wt Readings from Last 1 Encounters:   07/15/21 68 kg (149 lb 14.6 oz)     Current Facility-Administered  Medications   Medication     acetaminophen (TYLENOL) tablet 650 mg     albuterol (PROAIR HFA/PROVENTIL HFA/VENTOLIN HFA) 108 (90 Base) MCG/ACT inhaler 2 puff     citalopram (celeXA) tablet 20 mg     cyclobenzaprine (FLEXERIL) tablet 10 mg     glucose gel 15-30 g    Or     dextrose 50 % injection 25-50 mL    Or     glucagon injection 1 mg     donepezil (ARICEPT) tablet 10 mg     [START ON 8/8/2021] famotidine (PEPCID) tablet 20 mg     fluticasone (ARNUITY ELLIPTA) 200 MCG/ACT inhaler 1 puff     fluticasone (FLONASE) 50 MCG/ACT spray 2 spray     gabapentin (NEURONTIN) capsule 600 mg     guaiFENesin-dextromethorphan (ROBITUSSIN DM) 100-10 MG/5ML syrup 10 mL     insulin aspart (NovoLOG) injection (RAPID ACTING)     insulin aspart (NovoLOG) injection (RAPID ACTING)     levothyroxine (SYNTHROID/LEVOTHROID) tablet 88 mcg     Lidocaine (LIDOCARE) 4 % Patch 1-2 patch     lidocaine (LMX4) cream     lidocaine 1 % 0.1-1 mL     lisinopril (ZESTRIL) tablet 20 mg     LORazepam (ATIVAN) injection 0.25 mg     melatonin tablet 1 mg     metoprolol succinate ER (TOPROL-XL) 24 hr tablet 25 mg     olopatadine (PATANOL) 0.1 % ophthalmic solution 1 drop     ondansetron (ZOFRAN-ODT) ODT tab 4 mg    Or     ondansetron (ZOFRAN) injection 4 mg     Patient is already receiving anticoagulation with heparin, enoxaparin (LOVENOX), warfarin (COUMADIN)  or other anticoagulant medication     prochlorperazine (COMPAZINE) injection 5 mg    Or     prochlorperazine (COMPAZINE) tablet 5 mg    Or     prochlorperazine (COMPAZINE) suppository 12.5 mg     QUEtiapine (SEROquel XR) 24 hr tablet 50 mg     QUEtiapine (SEROquel) half-tab 12.5 mg     rivaroxaban ANTICOAGULANT (XARELTO) tablet 10 mg     simvastatin (ZOCOR) tablet 40 mg     sodium chloride (PF) 0.9% PF flush 3 mL     sodium chloride (PF) 0.9% PF flush 3 mL     zolpidem (AMBIEN) tablet 5 mg       Constitutional: Awake, alert, cooperative, no apparent distress   Respiratory: Clear to auscultation  bilaterally, scattered crackles or wheezing   Cardiovascular: Regular rate and rhythm, normal S1 and S2, and no murmur noted   Abdomen: Normal bowel sounds, soft, non-distended, non-tender   Skin: No rashes, no cyanosis, dry to touch   Neuro: Alert and oriented x2, no weakness, numbness, +memory loss   Extremities: No edema, normal range of motion   Other(s):HEENT Pink,unicteric, moist mucosa.       All other systems: Negative          Medications:      All current medications were reviewed with changes reflected in problem list.         Data:      All new lab and imaging data was reviewed.   Labs:  Recent Labs   Lab 08/07/21  1209 08/07/21  1004 08/07/21  0715 08/05/21  1334   NA  --  139  --  137   POTASSIUM  --  4.5  --  4.3   CHLORIDE  --  108  --  104   CO2  --  23  --  27   ANIONGAP  --  8  --  6   * 169* 141* 240*   BUN  --  11  --  12   CR  --  0.91  --  1.10*   GFRESTIMATED  --  54*  --  43*   CHRIS  --  8.2*  --  8.9     Recent Labs   Lab 08/06/21  0727 08/05/21  1334   WBC 7.0 8.7   HGB 8.9* 10.6*   HCT 29.0* 33.7*    98    241     Recent Labs   Lab 08/07/21  1209 08/07/21  1004 08/07/21  0715 08/07/21  0227 08/06/21  2124   * 169* 141* 129* 178*      Imaging:   Results for orders placed or performed during the hospital encounter of 08/05/21   CT Chest Pulmonary Embolism w Contrast    Narrative    CT CHEST PULMONARY EMBOLISM W CONTRAST 8/5/2021 3:17 PM    CLINICAL HISTORY: PE suspected, low/intermediate prob, positive  D-dimer, hypertension, type 2 diabetes mellitus, acute on chronic  respiratory failure with hypoxia, increased shortness of breath  TECHNIQUE: CT angiogram chest during arterial phase injection IV  contrast. 2D and 3D MIP reconstructions were performed by the CT  technologist. Dose reduction techniques were used.     CONTRAST: 55mL Isovue-370    COMPARISON: CT chest exams 7/12/2021 and 7/1/2021    FINDINGS:  ANGIOGRAM CHEST: Pulmonary arteries are normal caliber  and negative  for pulmonary emboli. Thoracic aorta is negative for dissection. No CT  evidence of right heart strain.    LUNGS AND PLEURA: The central airways are clear. Mild bronchiectasis.  Mild to moderate improvement of the previously seen diffuse bilateral  multilobar pulmonary airspace opacities. 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 No pleural effusion.    MEDIASTINUM/AXILLAE: No thoracic adenopathy. Normal heart size and no  pericardial effusion.    UPPER ABDOMEN: Postcholecystectomy.    MUSCULOSKELETAL: Osseous demineralization. Spinal degenerative  changes.      Impression    IMPRESSION:  1.  No pulmonary artery embolism or thoracic aortic dissection.  2.  Mild to moderate diffuse bilateral multilobar pulmonary airspace  opacities which are mild to moderately improved since the prior CT  from 7/12/2021, likely reflecting resolving pneumonia. No pleural  effusion.    GERSON MORRISON MD         SYSTEM ID:  [id number]

## 2021-08-07 NOTE — PLAN OF CARE
Presentation/Diagnosis: SOB, Diarrhea, Tachycardia  History: DM, Dementia, HTN, ASCVD, Hypothyroidism, Recovered Covid  Labs/Protocols: Glucose checks ACHS  Vitals: BP (!) 158/71 (BP Location: Left arm)   Pulse 96   Temp 98.5  F (36.9  C) (Axillary)   Resp 22   SpO2 93%   Cardiac:  WDL  Telemetry: SR  Respiratory: Expiratory wheezes throughout, On RA.   Neuro: Lethargic most of shift, confused. Daughters report that patient does not seem to recognize them. Attempted to assess using  IPad, but pt did not respond to .   GI/: Urinary Retention noted. Straight cath x1 for 1200cc. Inc of bowel.   Skin: Ecchymotic, scabbing. Mepliex to buttocks. Bleeding laceration to R pelvis.   LDAs: PIV SL.   Diet: Mod CHO.   Activity: Mech lift.   Pain: PAINDAD scale of 0.   Plan: Daughters want patient to discharge home when stable. For AMS, haldol discontinued, avoid ativan. PT/OT consulted although have yet to eval. TCU v home with [facility].

## 2021-08-07 NOTE — PLAN OF CARE
For vital signs and complete assessments, please see documentation flowsheets.     Pertinent assessments: Alert. No evidence of pain. Intermittently agitated and restless, trying to get out of bed; PRN haldol and ativan given. VSS. Sanguineous drainage from R pelvis laceration, barrier cream applied. Incontinent, no BM overnight.    Major Shift Events: Pt very agitated at times, sitter remains at bedside.  Plan (Upcoming Events): TBD  Discharge/Transfer Needs: TBD

## 2021-08-08 ENCOUNTER — APPOINTMENT (OUTPATIENT)
Dept: SPEECH THERAPY | Facility: CLINIC | Age: 86
DRG: 189 | End: 2021-08-08
Attending: INTERNAL MEDICINE
Payer: COMMERCIAL

## 2021-08-08 ENCOUNTER — APPOINTMENT (OUTPATIENT)
Dept: PHYSICAL THERAPY | Facility: CLINIC | Age: 86
DRG: 189 | End: 2021-08-08
Attending: INTERNAL MEDICINE
Payer: COMMERCIAL

## 2021-08-08 LAB
ANION GAP SERPL CALCULATED.3IONS-SCNC: 6 MMOL/L (ref 3–14)
BUN SERPL-MCNC: 15 MG/DL (ref 7–30)
CALCIUM SERPL-MCNC: 8.2 MG/DL (ref 8.5–10.1)
CHLORIDE BLD-SCNC: 111 MMOL/L (ref 94–109)
CO2 SERPL-SCNC: 24 MMOL/L (ref 20–32)
CREAT SERPL-MCNC: 1.16 MG/DL (ref 0.52–1.04)
GFR SERPL CREATININE-BSD FRML MDRD: 40 ML/MIN/1.73M2
GLUCOSE BLD-MCNC: 113 MG/DL (ref 70–99)
GLUCOSE BLDC GLUCOMTR-MCNC: 127 MG/DL (ref 70–99)
GLUCOSE BLDC GLUCOMTR-MCNC: 177 MG/DL (ref 70–99)
GLUCOSE BLDC GLUCOMTR-MCNC: 190 MG/DL (ref 70–99)
GLUCOSE BLDC GLUCOMTR-MCNC: 236 MG/DL (ref 70–99)
POTASSIUM BLD-SCNC: 4.6 MMOL/L (ref 3.4–5.3)
SODIUM SERPL-SCNC: 141 MMOL/L (ref 133–144)

## 2021-08-08 PROCEDURE — 99233 SBSQ HOSP IP/OBS HIGH 50: CPT | Performed by: INTERNAL MEDICINE

## 2021-08-08 PROCEDURE — 92610 EVALUATE SWALLOWING FUNCTION: CPT | Mod: GN | Performed by: SPEECH-LANGUAGE PATHOLOGIST

## 2021-08-08 PROCEDURE — 80048 BASIC METABOLIC PNL TOTAL CA: CPT | Performed by: INTERNAL MEDICINE

## 2021-08-08 PROCEDURE — 97530 THERAPEUTIC ACTIVITIES: CPT | Mod: GP

## 2021-08-08 PROCEDURE — 250N000013 HC RX MED GY IP 250 OP 250 PS 637: Performed by: INTERNAL MEDICINE

## 2021-08-08 PROCEDURE — 99221 1ST HOSP IP/OBS SF/LOW 40: CPT | Mod: GC | Performed by: UROLOGY

## 2021-08-08 PROCEDURE — 36415 COLL VENOUS BLD VENIPUNCTURE: CPT | Performed by: INTERNAL MEDICINE

## 2021-08-08 PROCEDURE — 120N000001 HC R&B MED SURG/OB

## 2021-08-08 PROCEDURE — 97161 PT EVAL LOW COMPLEX 20 MIN: CPT | Mod: GP

## 2021-08-08 RX ADMIN — GABAPENTIN 600 MG: 300 CAPSULE ORAL at 16:28

## 2021-08-08 RX ADMIN — FAMOTIDINE 20 MG: 20 TABLET ORAL at 08:34

## 2021-08-08 RX ADMIN — RIVAROXABAN 10 MG: 10 TABLET, FILM COATED ORAL at 16:28

## 2021-08-08 RX ADMIN — GABAPENTIN 600 MG: 300 CAPSULE ORAL at 08:34

## 2021-08-08 RX ADMIN — SIMVASTATIN 40 MG: 40 TABLET, FILM COATED ORAL at 08:34

## 2021-08-08 RX ADMIN — OLOPATADINE HYDROCHLORIDE 1 DROP: 1 SOLUTION OPHTHALMIC at 08:49

## 2021-08-08 RX ADMIN — LISINOPRIL 20 MG: 20 TABLET ORAL at 08:34

## 2021-08-08 RX ADMIN — ZOLPIDEM TARTRATE 5 MG: 5 TABLET, COATED ORAL at 21:40

## 2021-08-08 RX ADMIN — DONEPEZIL HYDROCHLORIDE 10 MG: 10 TABLET ORAL at 21:40

## 2021-08-08 RX ADMIN — METFORMIN HYDROCHLORIDE 500 MG: 500 TABLET, FILM COATED ORAL at 16:28

## 2021-08-08 RX ADMIN — GABAPENTIN 600 MG: 300 CAPSULE ORAL at 21:40

## 2021-08-08 RX ADMIN — METOPROLOL SUCCINATE 25 MG: 25 TABLET, EXTENDED RELEASE ORAL at 08:34

## 2021-08-08 RX ADMIN — CITALOPRAM HYDROBROMIDE 20 MG: 20 TABLET ORAL at 08:34

## 2021-08-08 RX ADMIN — METFORMIN HYDROCHLORIDE 500 MG: 500 TABLET, FILM COATED ORAL at 08:34

## 2021-08-08 RX ADMIN — CYCLOBENZAPRINE HYDROCHLORIDE 10 MG: 5 TABLET, FILM COATED ORAL at 08:34

## 2021-08-08 RX ADMIN — LEVOTHYROXINE SODIUM 88 MCG: 0.09 TABLET ORAL at 08:34

## 2021-08-08 RX ADMIN — OLOPATADINE HYDROCHLORIDE 1 DROP: 1 SOLUTION OPHTHALMIC at 21:52

## 2021-08-08 ASSESSMENT — ACTIVITIES OF DAILY LIVING (ADL)
ADLS_ACUITY_SCORE: 29

## 2021-08-08 NOTE — PLAN OF CARE
Presentation/Diagnosis: SOB, Diarrhea, Tachycardia  History: DM, Dementia, HTN, ASCVD, Hypothyroidism, Recovered Covid  Labs/Protocols: Glucose checks ACHS  Vitals: BP (!) 158/71 (BP Location: Left arm)   Pulse 96   Temp 98.5  F (36.9  C) (Axillary)   Resp 22   SpO2 93%   Cardiac:  WDL  Telemetry: SR/ST, one 9 beat run of SVT.   Respiratory: LS clear to auscultation, but wheezing noted in upper airway. On RA.   Neuro: Lethargy and confusion improved this shift. Able to communicate via . Falls back asleep quickly.   GI/: Urinary Retention noted. Hawley placed on 3rd need for cath. Inc of bowel.   Skin: Ecchymotic, scabbing. Mepliex to buttocks. Open laceration to R pelvis.   LDAs: PIV SL.   Diet: Mod CHO. Coughing noted after breakfast. Speech Therapy consulted - added nectar thick liquids.   Activity: Mech lift. PT eval'd today. Recommending TCU or home with sean.   Pain: PAINDAD scale of 0.   Plan: Daughters want patient to discharge home when stable.  TCU v home with [facility] - daughters adamant about patient returning home.  SW is following. Urology consulted for recommendations r/t urinary retention.

## 2021-08-08 NOTE — CONSULTS
Urology Consult History and Physical    Name: Nhi Perry    MRN: [id number]   YOB: 1925       .        Chief Complaint:   Urinary retention          History of Present Illness:   Nhi Perry is a 89 year old [place]-speaking female with dementia, T2DM, recent COVID pneumonia who is currently admitted for SOB, generalized weakness, diarrhea. She has required straight cath x2 for 1200cc and 500cc per nursing notes. Patient was sleeping on my visit today, spoke with daughter who informed me that the patient never had urinary complaints or difficulty voiding prior to this hospitalization.     She is on vanco for possible C.diff. For psych meds she is on Aricept and Seroquel.     Crt 1.16, WBC 7.0, Hgb 8.9. UA negative.          Past Medical History:     Past Medical History:   Diagnosis Date     Arthritis      ASCVD (arteriosclerotic cardiovascular disease)     carotid surgery     DM II (diabetes mellitus, type II), controlled (H)      HTN (hypertension)      hypothyroidism      Intertrochanteric fracture of left femur (H) 01/01/2012    no surgical repair     Osteoporosis      Pulmonary tuberculosis     Remote history; treated in [facility] [place]     Retina disorder, left     hemorrhage     Seasonal allergies      Thyroid disease             Past Surgical History:     Past Surgical History:   Procedure Laterality Date     CAROTID ENDARTERECTOMY      with vein graft     CHOLECYSTECTOMY       EYE SURGERY      right cateract     GENITOURINARY SURGERY      lithotripsy for kidney stone     GI SURGERY      colonoscopy/ polypectomy     OPEN REDUCTION INTERNAL FIXATION HIP NAILING  7/28/2014    Procedure: OPEN REDUCTION INTERNAL FIXATION HIP NAILING;  Surgeon: Gee Garcia MD;  Location:  OR            Social History:     Social History     Tobacco Use     Smoking status: Never Smoker     Smokeless tobacco: Never Used   Substance Use Topics     Alcohol use: No     Alcohol/week: 0.0  standard drinks            Family History:     Family History   Problem Relation Age of Onset     Cancer Father      Alzheimer Disease Mother      Hypertension Mother      C.A.D. No family hx of             Allergies:     Allergies   Allergen Reactions     Seasonal Allergies             Medications:     Current Facility-Administered Medications   Medication     acetaminophen (TYLENOL) tablet 650 mg     albuterol (PROAIR HFA/PROVENTIL HFA/VENTOLIN HFA) 108 (90 Base) MCG/ACT inhaler 2 puff     cetirizine (zyrTEC) tablet 10 mg     citalopram (celeXA) tablet 20 mg     cyclobenzaprine (FLEXERIL) tablet 10 mg     glucose gel 15-30 g    Or     dextrose 50 % injection 25-50 mL    Or     glucagon injection 1 mg     donepezil (ARICEPT) tablet 10 mg     famotidine (PEPCID) tablet 20 mg     fluticasone (ARNUITY ELLIPTA) 200 MCG/ACT inhaler 1 puff     fluticasone (FLONASE) 50 MCG/ACT spray 2 spray     gabapentin (NEURONTIN) capsule 600 mg     guaiFENesin-dextromethorphan (ROBITUSSIN DM) 100-10 MG/5ML syrup 10 mL     insulin aspart (NovoLOG) injection (RAPID ACTING)     insulin aspart (NovoLOG) injection (RAPID ACTING)     levothyroxine (SYNTHROID/LEVOTHROID) tablet 88 mcg     Lidocaine (LIDOCARE) 4 % Patch 1-2 patch     lidocaine (LMX4) cream     lidocaine 1 % 0.1-1 mL     lisinopril (ZESTRIL) tablet 20 mg     LORazepam (ATIVAN) injection 0.25 mg     melatonin tablet 1 mg     metFORMIN (GLUCOPHAGE) tablet 500 mg     metoprolol succinate ER (TOPROL-XL) 24 hr tablet 25 mg     olopatadine (PATANOL) 0.1 % ophthalmic solution 1 drop     ondansetron (ZOFRAN-ODT) ODT tab 4 mg    Or     ondansetron (ZOFRAN) injection 4 mg     Patient is already receiving anticoagulation with heparin, enoxaparin (LOVENOX), warfarin (COUMADIN)  or other anticoagulant medication     prochlorperazine (COMPAZINE) injection 5 mg    Or     prochlorperazine (COMPAZINE) tablet 5 mg    Or     prochlorperazine (COMPAZINE) suppository 12.5 mg     QUEtiapine  (SEROquel XR) 24 hr tablet 50 mg     QUEtiapine (SEROquel) half-tab 12.5 mg     rivaroxaban ANTICOAGULANT (XARELTO) tablet 10 mg     simvastatin (ZOCOR) tablet 40 mg     sodium chloride (PF) 0.9% PF flush 3 mL     sodium chloride (PF) 0.9% PF flush 3 mL     zolpidem (AMBIEN) tablet 5 mg             Review of Systems:    ROS: 10 point ROS neg other than the symptoms noted above in the HPI           Physical Exam:   VS:  T: 98.2    HR: 93    BP: 143/67    RR: 16   GEN:  Sleeping     CV:  RRR  LUNGS: Non-labored breathing.   SKIN:  Warm.  Dry.  No rashes.  NEURO:  CN grossly intact.            Data:   All laboratory data reviewed:    Recent Labs   Lab 08/06/21  0727 08/05/21  1334   WBC 7.0 8.7   HGB 8.9* 10.6*    241     Recent Labs   Lab 08/08/21  0615 08/08/21  0222 08/07/21  2155 08/07/21  1724 08/07/21  1004 08/05/21  1334     --   --   --  139 137   POTASSIUM 4.6  --   --   --  4.5 4.3   CHLORIDE 111*  --   --   --  108 104   CO2 24  --   --   --  23 27   BUN 15  --   --   --  11 12   CR 1.16*  --   --   --  0.91 1.10*   * 127* 141* 170* 169* 240*   CHRIS 8.2*  --   --   --  8.2* 8.9     Recent Labs   Lab 08/06/21  0349   COLOR Light Yellow   APPEARANCE Clear   URINEGLC 50 *   URINEBILI Negative   URINEKETONE Negative   SG 1.021   URINEPH 5.5   PROTEIN Negative   NITRITE Negative   LEUKEST Negative            Impression and Plan:   Impression:   Nhi Perry is a 89 year old female with dementia, T2DM, recent COVID pneumonia who is currently admitted for SOB, generalized weakness, diarrhea. Has required CIC x2 for difficulty voiding.       Plan:   - Avoid anticholinergic, antihistamine, and alpha-agonist medications as these will exacerbate urinary retention  - Minimize narcotic pain medication regimen as tolerated  - Send UA/UC to rule out UTI as etiology of urinary retention  - Increased ambulation/mobility will also usually help with improvement in the degree of urinary retention  -  Start tamsulosin 0.4 mg qday    - Patient can either be managed with a indwelling pena catheter vs clean intermittent catheterization   - If CIC is chosen, then the patient and her caretaker will need nursing instruction on proper self-intermittent catheterization technique.  - Remind patient and caretakers that CIC should be performed ONLY after an attempt at spontaneous voiding is made  - Frequency of SIC can be determined based on the average post-void residual:  If PVR < 150cc - no cathing needed  If PVR is 150-250cc - cath bid (qam & qhs)  If PVR is 251-350cc - cath tid  If PVR is 351-450cc - cath qid  If PVR is > 450cc - cath every 4 hours  - The patient or caretaker will need to keep a journal of her cathing regimen after discharge (will need to include frequency of cathing and post-void residual amount obtained from CIC) and bring this to clinic for review   - If patient or caretaker unwilling or unable to perform CIC, may leave pena catheter in place    - urology will sign off     This patient's exam findings, labs, and imaging discussed with urology staff surgeon Dr Stuart who developed the treatment plan.    Annie Jerez MD  Urology Resident

## 2021-08-08 NOTE — PROVIDER NOTIFICATION
"MD notified \"FYI, pt had a 9 beat run of SVT. She appeared to be sleeping at the time. HR currently 105, /62 and O2 93 on RA. Thanks.\"    Awaiting response.   "

## 2021-08-08 NOTE — PLAN OF CARE
Lethargic/ confused. VSS.  and 127. LS diminished. Tele SR. Urinary retention- straight cath'd for 500 ml. Med's given in pudding.   Marianne Escobar RN on 8/8/2021 at 4:06 AM

## 2021-08-08 NOTE — PROGRESS NOTES
08/08/21 1300   Quick Adds   Type of Visit Initial PT Evaluation       Present yes   Language Togolese   Living Environment   People in home child(zeenat), adult   Current Living Arrangements house   Home Accessibility stairs to enter home   Number of Stairs, Main Entrance 2   Self-Care   Usual Activity Tolerance poor   Current Activity Tolerance poor   Equipment Currently Used at Home wheelchair, manual   Activity/Exercise/Self-Care Comment Since most recent hospitalization pt has been needing Ax2 to transfer. Pt does not ambulate. Pt was not able to stand for a few days prior to this admission.    Disability/Function   Walking or Climbing Stairs Difficulty yes   Walking or Climbing Stairs ambulation difficulty, dependent;transferring difficulty, dependent   Mobility Management Was requiring Ax2 for SPT prior to admission.    General Information   Onset of Illness/Injury or Date of Surgery 08/05/21   Referring Physician Alejandro Collier MD    Patient/Family Therapy Goals Statement (PT) Per daughter: return home   Pertinent History of Current Problem (include personal factors and/or comorbidities that impact the POC) 89 year old female with a history of type 2 diabetes mellitus, remote history of pulmonary tuberculosis, carotid artery disease s/p carotid surgery, hypothyroidism, recent Covid pneumonia with subsequent suspected bacterial superinfection admitted on 8/5/2021 with shortness of breath, generalized weakness, hypoxia, and diarrhea.   Existing Precautions/Restrictions fall   Cognition   Follows Commands (Cognition) follows one-step commands   Posture    Posture Forward head position;Protracted shoulders;Kyphosis   Range of Motion (ROM)   ROM Quick Adds ROM WNL   Strength   Strength Comments Generalized global weakness; pt not able to achieve standing with Ax2   Transfers   Transfer Safety Comments Trialed STS into [place] Stedy with maxAx2; not able to achieve standing   Balance    Balance Comments Good static sitting balance   Clinical Impression   Criteria for Skilled Therapeutic Intervention yes, treatment indicated   PT Diagnosis (PT) Generalized weakness   Influenced by the following impairments Decreased strength globally, decreased activity tolerance   Functional limitations due to impairments Decreased IND with bed mobility, transfers   Clinical Presentation Evolving/Changing   Clinical Presentation Rationale Changing mentation still per family   Clinical Decision Making (Complexity) low complexity   Therapy Frequency (PT) 3x/week   Predicted Duration of Therapy Intervention (days/wks) 1 week   Planned Therapy Interventions (PT) bed mobility training;neuromuscular re-education;strengthening;transfer training   Risk & Benefits of therapy have been explained evaluation/treatment results reviewed;care plan/treatment goals reviewed;participants included;daughter   PT Discharge Planning    PT Discharge Recommendation (DC Rec) Transitional Care Facility   PT Rationale for DC Rec Family declining this session. If pt were to return home would need physical assist with all mobility/cares, sean lift and HHPT.    PT Brief overview of current status  Lift

## 2021-08-08 NOTE — PROGRESS NOTES
08/08/21 1100   General Information   Onset of Illness/Injury or Date of Surgery 08/05/21   Referring Physician Dr. Ramos   Patient/Family Therapy Goal Statement (SLP) safe swallow   General Observations Pt came in with complaints of shortness of breath, generalized weakness and diarrhea.  Nhi Perry is a 89-year-old female with past medical history significant for diabetes mellitus type 2, atherosclerotic cardiovascular disease, thyroid disease, who was recently diagnosed with COVID-19 pneumonia on 06/22/2021.  At that time, she developed bilateral pneumonia with hypoxia and was hospitalized from 07/01-07/05/2021.  A CT was consistent with COVID-19 pneumonia.  It also showed old granuloma and upper lobe pleural plaque.  The patient was also admitted to [facility] on from 07/11-07/16/2021 for bilateral pneumonia with acute hypoxia.  She was treated with IV antibiotic, Zithromax and Zosyn while in the hospital and she was discharged on Augmentin.The patient started to have episodes of diarrhea while in the hospital and this continued after discharge. Nursing reports that patient has been coughing on thin liquids. Daughter also reported that she coughing at times on thin liquid at home.   Type of Evaluation   Type of Evaluation Swallow Evaluation   Oral Motor   Oral Musculature other (see comments)  (Tajik  used and patient wit poor comprehension)   Dentition (Oral Motor)   Dentition (Oral Motor) significant number of missing teeth;dental appliance/dentures   Dental Appliance/Denture (Oral Motor) upper   Facial Symmetry (Oral Motor)   Facial Symmetry (Oral Motor) WNL   Vocal Quality/Secretion Management (Oral Motor)   Vocal Quality (Oral Motor) WNL   Secretion Management (Oral Motor) WNL   General Swallowing Observations   Current Diet/Method of Nutritional Intake (General Swallowing Observations, NIS) thin liquids (level 0);regular diet   Respiratory Support (General Swallowing  Observations) none   Swallowing Evaluation Clinical swallow evaluation   Clinical Swallow Evaluation   Feeding Assistance dependent   Clinical Swallow Evaluation Textures Trialed thin liquids;mildly thick liquids;pureed;solid foods   Clinical Swallow Eval: Thin Liquid Texture Trial   Mode of Presentation, Thin Liquids cup;self-fed;fed by clinician   Volume of Liquid or Food Presented sips   Oral Phase of Swallow Poor AP movement;Premature pharyngeal entry   Pharyngeal Phase of Swallow coughing/choking;repeated swallows   Diagnostic Statement PT with overt s/s aspiration on thin liquids   Clinical Swallow Eval: Mildly Thick Liquids   Mode of Presentation cup;straw   Volume Presented sips   Oral Phase WFL   Pharyngeal Phase intact   Diagnostic Statement No overt s/s aspiration.  Dtr reports that she always uses a straw at home.   Clinical Swallow Evaluation: Puree Solid Texture Trial   Mode of Presentation, Puree spoon;fed by clinician   Volume of Puree Presented bites   Oral Phase, Puree WFL   Pharyngeal Phase, Puree intact   Diagnostic Statement No overt s/s aspiration    Clinical Swallow Evaluation: Solid Food Texture Trial   Mode of Presentation fed by clinician   Volume Presented cracker   Oral Phase WFL   Pharyngeal Phase intact   Diagnostic Statement mildly increased oral transit times on solid but wfl with 4 bottom teeth.  Dtr reports patient eats hard solids at home without difficulty.   Swallowing Recommendations   Diet Consistency Recommendations mildly thick liquids (level 2);regular diet   Supervision Level for Intake 1:1 supervision needed   Mode of Delivery Recommendations bolus size, small;slow rate of intake   Swallowing Maneuver Recommendations alternate food and liquid intake;other (see comments)  (use straw in liquids)   Monitoring/Assistance Required (Eating/Swallowing) stop eating activities when fatigue is present;monitor for cough or change in vocal quality with intake   Recommended  Feeding/Eating Techniques (Swallow Eval) maintain upright sitting position for eating;maintain upright posture during/after eating for 30 minutes   Medication Administration Recommendations, Swallowing (SLP) whole with liquids   Instrumental Assessment Recommendations instrumental evaluation not recommended at this time   Comment, Swallowing Recommendations Pt completed clinical swallow evaluation with Portuguese .  She is confused and had difficulty understanding the  at times.  Her dtr came into the room half way through the evaluation and helped her mother understand instructions.  She was too confused to [facility].  She has upper denture and 4 bottom teeth which her dtr reports are adequate for chewing solids.  She was able to easily suck through a straw.  Pt presented thin liquids in cup.  She needed assistance holding the cup for sipping.  She took thin liquids in and appeared to have premature spillage over back of tongue resulting in coughing on 50% of trials.  Mildly thick liquids (2) were presented in cup and with straw and she demonstrated better oral control on all sips with no cough after swallows. Pureed textures were adequately managed with timely swallows and no oral residue.  Adequate oral bolus management with cracker despite mildly increased oral prep with only 4 bottom teeth.  Pt able to clear boluses in single swallows using small bites.  Laryngeal elevation all swallows. Pt demonstrates mild oral phase dysphagia with possible premature spillage of thin liquids.  She should tolerate a regular diet with mildly thick liquids using a straw.  SHe should be upright all oral intake and fed small bites and encourage small sips via the straw.  Upright 30 minutes after oral intake.    General Therapy Interventions   Planned Therapy Interventions Dysphagia Treatment   Dysphagia treatment Modified diet education;Instruction of safe swallow strategies   SLP Therapy  Assessment/Plan   Criteria for Skilled Therapeutic Interventions Met (SLP Eval) yes;treatment indicated   SLP Diagnosis mild oral phase dysphagia   Rehab Potential (SLP Eval) good, to achieve stated therapy goals   Therapy Frequency (SLP Eval) 3 times/wk   Predicted Duration of Therapy Intervention (SLP Eval) 1 week   Therapy Plan Review/Discharge Plan (SLP)   Therapy Plan Review (SLP) evaluation/treatment results reviewed;care plan/treatment goals reviewed;risks/benefits reviewed;current/potential barriers reviewed;participants voiced agreement with care plan;participants included;patient;daughter   SLP Discharge Planning    SLP Discharge Recommendation (DC Rec) home with assist   SLP Rationale for DC Rec PT most likely to meet goals prior to discharge.  She lives at home with her dtr who has been educated in helping her with safe oral intake, how to thicken liquids.    SLP Brief overview of current status  regular , mildly thick liquids (2)    Total Evaluation Time   Total Evaluation Time (Minutes) 26

## 2021-08-08 NOTE — PROGRESS NOTES
[facility]    Hospitalist Progress Note  Name: Nhi Perry    MRN: [id number]  Provider:  Norberto Ramos DO  Date of Service: 08/08/2021    Summary of Stay: Nhi Perry is a 89 year old female with a history of type 2 diabetes mellitus, remote history of pulmonary tuberculosis, carotid artery disease s/p carotid surgery, hypothyroidism, recent Covid pneumonia with subsequent suspected bacterial superinfection admitted on 8/5/2021 with shortness of breath, generalized weakness, hypoxia, and diarrhea.  The patient had been diagnosed with COVID-19 pneumonia on 6/22/2021.  She was hospitalized from 7/127/5/2021 for Covid pneumonia.  She was also hospitalized at [facility] from 7/11 to 7/16/2021 for bilateral pneumonia with acute hypoxia.  She was treated with IV antibiotics at that time including Zithromax and Zosyn and discharged on Augmentin.  The patient was discharged home with 2 L of oxygen at night.  Prior to arrival the patient was noted to be hypoxic to 80% requiring supplemental oxygen.  The patient had a CT chest showing no pulmonary embolism or thoracic aortic dissection however did note mild to moderate diffuse bilateral multilobar pulmonary airspace opacities slightly improved since prior exam.  The patient was started on breathing treatments and her oxygen was weaned.  The patient was seen by Occupational Therapy who recommended TCU.    TODAY'S PLAN: Patient was weaned off of oxygen and is doing well.  The patient was noted to have a cough after eating and there were concerns for possible aspiration so we will consult speech therapy today.  Patient also had 2 episodes of urinary retention requiring straight caths yesterday.  Consult Urology.  Pt likely medically stable for discharge to TCU tomorrow.  Discussed with daughter via phone.  See conversation below.    Problem List:   1.  Acute on Chronic Hypoxic Respiratory Failure s/p Covid Pneumonia diagnosed on  6/22/2021  - CT chest showed improving bilateral pulmonary opacities  - Weaned off of O2 on 8/7/2021  - Consult Speech Therapy with concern for aspiration  - Improved  - Continue Xarelto for covid prophylaxis  - PT/OT recommending TCU.  Family prefers to take pt home.  I encouraged the family to send the pt to TCU as she is requiring significant assistance.  They state her son is coming from [place] and they want her home to see him, then they would send him to TCU.  Discussed that this is not possible given her extra needs.  Daughter will discuss with her family today.    2.  Diabetes Mellitus Type 2  - A1C = 6.8  - Insulin sliding scale  - PTA metformin on hold secondary to lactic acidosis    3.  Diarrhea  - Resolved  - Possibly secondary to abx side effect as recently on Augmentin    4.  Acute Metabolic Encephalopathy with Hospital Delerium  - CT head with no acute abnormality  - Continue prn seroquel    5.  Lactic Acidosis  - Likely secondary to brochodilators and hypoxia  - Improved    6.  Normocytic Anemia   - Stable  - Baseline Hgb 10  - Daily CBC    7.  Urinary Retention  - Has required several straight caths  - Consult Urology  - Will likely need pena later today    Chronic Medical Problems:  Carotid Artery Disease s/p Carotid Surgery  Osteoarthritis  Osteoporosis  Hypothyroidism  Hx of Pulmonary Tuberculosis  Hypertension    DVT Prophylaxis: Xarelto  Code Status: No CPR- Do NOT Intubate  Diet: Combination Diet Regular Diet Adult; Mildly Thick (level 2); Consistent Carb 75 Grams CHO per Meal Diet    Pena Catheter: Not present  Disposition: Expected discharge tomorrow to TCU. Goals prior to discharge include urinary retention improved and speech therapy assessment complete.   Family updated today: Yes      Interval History   Pt seen and examined.  Seen with Ipad .  Pt knows she is in the hospital but thinks it is 1945.    -Data reviewed today: I personally reviewed all new labs and imaging results  over the last 24 hours.     Physical Exam   Temp: 98.2  F (36.8  C) Temp src: Axillary BP: 137/62 Pulse: 105   Resp: 20 SpO2: 93 % O2 Device: None (Room air)    There were no vitals filed for this visit.  Vital Signs with Ranges  Temp:  [98.2  F (36.8  C)-98.5  F (36.9  C)] 98.2  F (36.8  C)  Pulse:  [] 105  Resp:  [16-22] 20  BP: (123-158)/(53-71) 137/62  SpO2:  [92 %-93 %] 93 %  I/O last 3 completed shifts:  In: 250 [P.O.:240; I.V.:10]  Out: 1700 [Urine:1700]    GENERAL: No apparent distress. Awake, alert.  HEENT: Normocephalic, atraumatic. Extraocular movements intact.  CARDIOVASCULAR: Regular rate and rhythm without murmurs or rubs. No S3.  PULMONARY: Clear bilaterally.  GASTROINTESTINAL: Soft, non-tender, non-distended. Bowel sounds normoactive.   EXTREMITIES: No cyanosis or clubbing. No edema.  NEUROLOGICAL: CN 2-12 grossly intact, no focal neurological deficits.  DERMATOLOGICAL: No rash, ulcer, bruising, nor jaundice.    Medications     - MEDICATION INSTRUCTIONS -         citalopram  20 mg Oral Daily     cyclobenzaprine  10 mg Oral Daily     donepezil  10 mg Oral At Bedtime     famotidine  20 mg Oral Daily     fluticasone  1 puff Inhalation Daily     gabapentin  600 mg Oral TID     insulin aspart  1-7 Units Subcutaneous TID AC     insulin aspart  1-5 Units Subcutaneous At Bedtime     levothyroxine  88 mcg Oral QAM AC     lisinopril  20 mg Oral Daily     metFORMIN  500 mg Oral BID w/meals     metoprolol succinate ER  25 mg Oral Daily     olopatadine  1 drop Both Eyes BID     QUEtiapine  50 mg Oral At Bedtime     rivaroxaban ANTICOAGULANT  10 mg Oral Daily with supper     simvastatin  40 mg Oral Daily     sodium chloride (PF)  3 mL Intracatheter Q8H     zolpidem  5 mg Oral At Bedtime     Data     Laboratory:  Recent Labs   Lab 08/06/21  0727 08/05/21  1334   WBC 7.0 8.7   HGB 8.9* 10.6*   HCT 29.0* 33.7*    98    241     Recent Labs   Lab 08/08/21  1131 08/08/21  0615 08/08/21  0222  08/07/21  1004 08/05/21  1334   NA  --  141  --  139 137   POTASSIUM  --  4.6  --  4.5 4.3   CHLORIDE  --  111*  --  108 104   CO2  --  24  --  23 27   ANIONGAP  --  6  --  8 6   * 113* 127* 169* 240*   BUN  --  15  --  11 12   CR  --  1.16*  --  0.91 1.10*   GFRESTIMATED  --  40*  --  54* 43*   CHRIS  --  8.2*  --  8.2* 8.9     No results for input(s): CULT in the last 168 hours.    Imaging:  No results found for this or any previous visit (from the past 24 hour(s)).      Norberto Ramos DO  [facility] Hospitalist  [address] [place] [address]  08/08/2021

## 2021-08-09 ENCOUNTER — APPOINTMENT (OUTPATIENT)
Dept: OCCUPATIONAL THERAPY | Facility: CLINIC | Age: 86
DRG: 189 | End: 2021-08-09
Payer: COMMERCIAL

## 2021-08-09 ENCOUNTER — APPOINTMENT (OUTPATIENT)
Dept: SPEECH THERAPY | Facility: CLINIC | Age: 86
DRG: 189 | End: 2021-08-09
Payer: COMMERCIAL

## 2021-08-09 LAB
GLUCOSE BLDC GLUCOMTR-MCNC: 132 MG/DL (ref 70–99)
GLUCOSE BLDC GLUCOMTR-MCNC: 144 MG/DL (ref 70–99)
GLUCOSE BLDC GLUCOMTR-MCNC: 148 MG/DL (ref 70–99)
GLUCOSE BLDC GLUCOMTR-MCNC: 165 MG/DL (ref 70–99)
GLUCOSE BLDC GLUCOMTR-MCNC: 99 MG/DL (ref 70–99)

## 2021-08-09 PROCEDURE — 99233 SBSQ HOSP IP/OBS HIGH 50: CPT | Performed by: INTERNAL MEDICINE

## 2021-08-09 PROCEDURE — 120N000001 HC R&B MED SURG/OB

## 2021-08-09 PROCEDURE — 92526 ORAL FUNCTION THERAPY: CPT | Mod: GN | Performed by: SPEECH-LANGUAGE PATHOLOGIST

## 2021-08-09 PROCEDURE — 97530 THERAPEUTIC ACTIVITIES: CPT | Mod: GO | Performed by: OCCUPATIONAL THERAPIST

## 2021-08-09 PROCEDURE — 250N000013 HC RX MED GY IP 250 OP 250 PS 637: Performed by: INTERNAL MEDICINE

## 2021-08-09 RX ORDER — LISINOPRIL 20 MG/1
20 TABLET ORAL DAILY
Qty: 30 TABLET | Refills: 0 | Status: SHIPPED | OUTPATIENT
Start: 2021-08-10

## 2021-08-09 RX ADMIN — CYCLOBENZAPRINE HYDROCHLORIDE 10 MG: 5 TABLET, FILM COATED ORAL at 10:23

## 2021-08-09 RX ADMIN — LISINOPRIL 20 MG: 20 TABLET ORAL at 10:26

## 2021-08-09 RX ADMIN — METFORMIN HYDROCHLORIDE 500 MG: 500 TABLET, FILM COATED ORAL at 10:28

## 2021-08-09 RX ADMIN — ZOLPIDEM TARTRATE 5 MG: 5 TABLET, COATED ORAL at 21:52

## 2021-08-09 RX ADMIN — DONEPEZIL HYDROCHLORIDE 10 MG: 10 TABLET ORAL at 21:52

## 2021-08-09 RX ADMIN — QUETIAPINE FUMARATE 50 MG: 50 TABLET, EXTENDED RELEASE ORAL at 21:52

## 2021-08-09 RX ADMIN — FAMOTIDINE 20 MG: 20 TABLET ORAL at 10:27

## 2021-08-09 RX ADMIN — METFORMIN HYDROCHLORIDE 500 MG: 500 TABLET, FILM COATED ORAL at 17:14

## 2021-08-09 RX ADMIN — SIMVASTATIN 40 MG: 40 TABLET, FILM COATED ORAL at 10:26

## 2021-08-09 RX ADMIN — METOPROLOL SUCCINATE 25 MG: 25 TABLET, EXTENDED RELEASE ORAL at 10:28

## 2021-08-09 RX ADMIN — GABAPENTIN 600 MG: 300 CAPSULE ORAL at 21:52

## 2021-08-09 RX ADMIN — OLOPATADINE HYDROCHLORIDE 1 DROP: 1 SOLUTION OPHTHALMIC at 10:31

## 2021-08-09 RX ADMIN — RIVAROXABAN 10 MG: 10 TABLET, FILM COATED ORAL at 17:14

## 2021-08-09 RX ADMIN — LEVOTHYROXINE SODIUM 88 MCG: 0.09 TABLET ORAL at 10:28

## 2021-08-09 RX ADMIN — CITALOPRAM HYDROBROMIDE 20 MG: 20 TABLET ORAL at 10:27

## 2021-08-09 RX ADMIN — GABAPENTIN 600 MG: 300 CAPSULE ORAL at 17:14

## 2021-08-09 ASSESSMENT — ACTIVITIES OF DAILY LIVING (ADL)
ADLS_ACUITY_SCORE: 28
ADLS_ACUITY_SCORE: 29
ADLS_ACUITY_SCORE: 28

## 2021-08-09 NOTE — CONSULTS
Care Management Follow Up    Length of Stay (days): 4    Expected Discharge Date: 08/09/2021     Concerns to be Addressed: discharge planning, care coordination/care conferences     Patient plan of care discussed at interdisciplinary rounds: Yes    Anticipated Discharge Disposition: Home Care     Anticipated Discharge Services:  Home care  Anticipated Discharge DME: Other (see comment) (sean lift)    Patient/family educated on Medicare website which has current facility and service quality ratings: no  Education Provided on the Discharge Plan:  yes  Patient/Family in Agreement with the Plan:yes  Referrals Placed by CM/SW: Durable Medical Equipment (DME)  Private pay costs discussed: transportation costs   Reviewed out of pocket cost for  [employer] stretcher transport, $1042.75 for base rate and $25 per mile to the destination. Pt/family expressed understanding and are agreeable to this.        Additional Information:  Per MD note, pt is medically stable for discharge to TCU.  Spoke with pt cornelia Altamirano who said they will not allow pt to go to TCU.  They want to bring pt home. Shana said that she, her  and adult daughter will take care of the pt at home.  Pt has HC through [employer]  RN/PT/OT. P: [phone] Fax [phone].. (SM left VM with HC to verify services and inform them of discharge plan). She is aware that we are using a lift to move pt at the hospital. Shana said that they rented a sean from [employer] in the past and would like to do that again.    Spoke with Loida from [employer] [phone] who verified pt has rented equipment in the past.  She has begun the insurance authorization process and is sending list of information she needs.  She anticipates the process will take a day.      Stretcher medical transport will need arrange once equipment plan has been arranged.    Akila Cortez RN, BSN, PHN, CTS  Care Coordinator  [facility]  [phone]        Addendum  [phone]  Faxed the following information to [facility] fax [phone]: written order including pt name, NPI or ordering provider, signature of provider, H&P, facesheet, rehab flowsheet and nursing note. Updated provider and pt dtg that will not get equipment approved until tomorrow, so anticipate discharge to home tomorrow.

## 2021-08-09 NOTE — PROGRESS NOTES
Urology Chart Check    Patient's daughter has has elected for Hawley catheter placement for retention. Home health nurse can change in one month. Sign off.  Please let us know if any urological concerns.   Ami Rainey PA-C

## 2021-08-09 NOTE — PLAN OF CARE
Alert/ confused. VSS.   and 144. Hawley in place for retention. Tele SR. Med's crushed in pudding. Nectar thick liquids. Wound to buttocks. Repositioning provided.  Marianne Escobar RN on 8/9/2021 at 4:01 AM

## 2021-08-09 NOTE — PROGRESS NOTES
[facility]    Hospitalist Progress Note  Name: Nhi Perry    MRN: [id number]  Provider:  Norberto Ramos DO  Date of Service: 08/09/2021    Summary of Stay: Nhi Perry is a 89 year old female with a history of type 2 diabetes mellitus, remote history of pulmonary tuberculosis, carotid artery disease s/p carotid surgery, hypothyroidism, recent Covid pneumonia with subsequent suspected bacterial superinfection admitted on 8/5/2021 with shortness of breath, generalized weakness, hypoxia, and diarrhea.  The patient had been diagnosed with COVID-19 pneumonia on 6/22/2021.  She was hospitalized from 7/127/5/2021 for Covid pneumonia.  She was also hospitalized at [facility] from 7/11 to 7/16/2021 for bilateral pneumonia with acute hypoxia.  She was treated with IV antibiotics at that time including Zithromax and Zosyn and discharged on Augmentin.  The patient was discharged home with 2 L of oxygen at night.  Prior to arrival the patient was noted to be hypoxic to 80% requiring supplemental oxygen.  The patient had a CT chest showing no pulmonary embolism or thoracic aortic dissection however did note mild to moderate diffuse bilateral multilobar pulmonary airspace opacities slightly improved since prior exam.  The patient was started on breathing treatments and her oxygen was weaned.  The patient was seen by Occupational Therapy who recommended TCU.  The patient did have some urinary retention and a pena catheter was placed.  Urology was consulted and recommended outpatient follow up.  On 8/9/2021, the patient was medically stable for discharge to TCU.  The patient's family refused TCU and requested pt go home with family.      TODAY'S PLAN:  Pt is medically stable for discharge to TCU.  Discussed with daughter.  Pt confused, likely hospital delerium.  Will take awhile to improve.  Continue pena with urology follow up in 2 weeks.  Family still would like pt to go home with  family and not TCU.  Could go home with home care.  Will discuss with social work.      Problem List:   1.  Acute on Chronic Hypoxic Respiratory Failure s/p Covid Pneumonia diagnosed on 6/22/2021  - CT chest showed improving bilateral pulmonary opacities  - Weaned off of O2 on 8/7/2021  - Consult Speech Therapy with concern for aspiration  - Improved  - Continue Xarelto for covid prophylaxis  - PT/OT recommending TCU.  Family prefers to take pt home.  I encouraged the family to send the pt to TCU as she is requiring significant assistance.  They state her son is coming from [place] and they want her home to see him, then they would send him to TCU.  Discussed that this is not possible given her extra needs.  Daughter will discuss with her family today.     2.  Diabetes Mellitus Type 2  - A1C = 6.8  - Insulin sliding scale  - PTA metformin on hold secondary to lactic acidosis     3.  Diarrhea  - Resolved  - Possibly secondary to abx side effect as recently on Augmentin     4.  Acute Metabolic Encephalopathy with Hospital Delerium  - CT head with no acute abnormality  - Continue prn seroquel     5.  Lactic Acidosis  - Likely secondary to brochodilators and hypoxia  - Improved     6.  Normocytic Anemia   - Stable  - Baseline Hgb 10  - Daily CBC     7.  Urinary Retention  - Has required several straight caths  - Consult Urology  - Continue pena  - Follow up with Urology as an outpatient     Chronic Medical Problems:  Carotid Artery Disease s/p Carotid Surgery  Osteoarthritis  Osteoporosis  Hypothyroidism  Hx of Pulmonary Tuberculosis  Hypertension    DVT Prophylaxis: Xarelto  Code Status: No CPR- Do NOT Intubate  Diet: Combination Diet Regular Diet Adult; Mildly Thick (level 2); Consistent Carb 75 Grams CHO per Meal Diet    Pena Catheter: PRESENT, indication: Retention  Disposition: Expected discharge today to TCU vs home with home care. Goals prior to discharge include TCU bed available, family decision regarding TCU  vs home with home care.   Family updated today: Yes      Interval History   Pt seen and examined.  Seen with [employer] .  Pt admits to on-going cough.    -Data reviewed today: I personally reviewed all new labs and imaging results over the last 24 hours.     Physical Exam   Temp: 98.3  F (36.8  C) Temp src: Oral BP: (!) 166/75 Pulse: 95   Resp: 20 SpO2: 95 % O2 Device: None (Room air)    There were no vitals filed for this visit.  Vital Signs with Ranges  Temp:  [97.8  F (36.6  C)-99.3  F (37.4  C)] 98.3  F (36.8  C)  Pulse:  [] 95  Resp:  [20-24] 20  BP: (124-171)/(62-97) 166/75  SpO2:  [92 %-96 %] 95 %  I/O last 3 completed shifts:  In: 553 [P.O.:540; I.V.:13]  Out: 1225 [Urine:1225]    GENERAL: No apparent distress. Awake, alert, and fully oriented.  HEENT: Normocephalic, atraumatic. Extraocular movements intact.  CARDIOVASCULAR: Regular rate and rhythm without murmurs or rubs. No S3.  PULMONARY: Clear bilaterally.  GASTROINTESTINAL: Soft, non-tender, non-distended. Bowel sounds normoactive.   EXTREMITIES: No cyanosis or clubbing. No edema.  NEUROLOGICAL: CN 2-12 grossly intact, no focal neurological deficits.  DERMATOLOGICAL: No rash, ulcer, bruising, nor jaundice.    Medications     - MEDICATION INSTRUCTIONS -         citalopram  20 mg Oral Daily     cyclobenzaprine  10 mg Oral Daily     donepezil  10 mg Oral At Bedtime     famotidine  20 mg Oral Daily     fluticasone  1 puff Inhalation Daily     gabapentin  600 mg Oral TID     insulin aspart  1-7 Units Subcutaneous TID AC     insulin aspart  1-5 Units Subcutaneous At Bedtime     levothyroxine  88 mcg Oral QAM AC     lisinopril  20 mg Oral Daily     metFORMIN  500 mg Oral BID w/meals     metoprolol succinate ER  25 mg Oral Daily     olopatadine  1 drop Both Eyes BID     QUEtiapine  50 mg Oral At Bedtime     rivaroxaban ANTICOAGULANT  10 mg Oral Daily with supper     simvastatin  40 mg Oral Daily     sodium chloride (PF)  3 mL Intracatheter Q8H      zolpidem  5 mg Oral At Bedtime     Data     Laboratory:  Recent Labs   Lab 08/06/21  0727 08/05/21  1334   WBC 7.0 8.7   HGB 8.9* 10.6*   HCT 29.0* 33.7*    98    241     Recent Labs   Lab 08/09/21  0858 08/09/21  0200 08/08/21  2229 08/08/21  0615 08/07/21  1004 08/05/21  1334   NA  --   --   --  141 139 137   POTASSIUM  --   --   --  4.6 4.5 4.3   CHLORIDE  --   --   --  111* 108 104   CO2  --   --   --  24 23 27   ANIONGAP  --   --   --  6 8 6   GLC 99 144* 190* 113* 169* 240*   BUN  --   --   --  15 11 12   CR  --   --   --  1.16* 0.91 1.10*   GFRESTIMATED  --   --   --  40* 54* 43*   CHRIS  --   --   --  8.2* 8.2* 8.9     No results for input(s): CULT in the last 168 hours.    Imaging:  No results found for this or any previous visit (from the past 24 hour(s)).      Norberto Ramos DO  [facility] Hospitalist  [address] [place] [address]  08/09/2021

## 2021-08-09 NOTE — PLAN OF CARE
Pt is alert to self. VSS. Up with lift. Tele is SR with PVC. Pt spitting out pills, crushed them and gave them with pudding. Hawley patent. Appetite good. Plan to discharge home once family gets sean equipment delivered.

## 2021-08-10 VITALS
TEMPERATURE: 97.9 F | DIASTOLIC BLOOD PRESSURE: 67 MMHG | RESPIRATION RATE: 20 BRPM | OXYGEN SATURATION: 92 % | HEART RATE: 101 BPM | SYSTOLIC BLOOD PRESSURE: 175 MMHG

## 2021-08-10 LAB
ANION GAP SERPL CALCULATED.3IONS-SCNC: 4 MMOL/L (ref 3–14)
BACTERIA BLD CULT: NO GROWTH
BACTERIA BLD CULT: NO GROWTH
BUN SERPL-MCNC: 16 MG/DL (ref 7–30)
CALCIUM SERPL-MCNC: 8.9 MG/DL (ref 8.5–10.1)
CHLORIDE BLD-SCNC: 107 MMOL/L (ref 94–109)
CO2 SERPL-SCNC: 28 MMOL/L (ref 20–32)
CREAT SERPL-MCNC: 0.95 MG/DL (ref 0.52–1.04)
ERYTHROCYTE [DISTWIDTH] IN BLOOD BY AUTOMATED COUNT: 14 % (ref 10–15)
GFR SERPL CREATININE-BSD FRML MDRD: 51 ML/MIN/1.73M2
GLUCOSE BLD-MCNC: 148 MG/DL (ref 70–99)
GLUCOSE BLDC GLUCOMTR-MCNC: 104 MG/DL (ref 70–99)
GLUCOSE BLDC GLUCOMTR-MCNC: 132 MG/DL (ref 70–99)
GLUCOSE BLDC GLUCOMTR-MCNC: 161 MG/DL (ref 70–99)
GLUCOSE BLDC GLUCOMTR-MCNC: 174 MG/DL (ref 70–99)
HCT VFR BLD AUTO: 29.1 % (ref 35–47)
HGB BLD-MCNC: 9.3 G/DL (ref 11.7–15.7)
MCH RBC QN AUTO: 31.6 PG (ref 26.5–33)
MCHC RBC AUTO-ENTMCNC: 32 G/DL (ref 31.5–36.5)
MCV RBC AUTO: 99 FL (ref 78–100)
PLATELET # BLD AUTO: 261 10E3/UL (ref 150–450)
POTASSIUM BLD-SCNC: 4.3 MMOL/L (ref 3.4–5.3)
RBC # BLD AUTO: 2.94 10E6/UL (ref 3.8–5.2)
SODIUM SERPL-SCNC: 139 MMOL/L (ref 133–144)
WBC # BLD AUTO: 9.3 10E3/UL (ref 4–11)

## 2021-08-10 PROCEDURE — 36415 COLL VENOUS BLD VENIPUNCTURE: CPT | Performed by: INTERNAL MEDICINE

## 2021-08-10 PROCEDURE — 85027 COMPLETE CBC AUTOMATED: CPT | Performed by: INTERNAL MEDICINE

## 2021-08-10 PROCEDURE — 82374 ASSAY BLOOD CARBON DIOXIDE: CPT | Performed by: INTERNAL MEDICINE

## 2021-08-10 PROCEDURE — 250N000013 HC RX MED GY IP 250 OP 250 PS 637: Performed by: INTERNAL MEDICINE

## 2021-08-10 PROCEDURE — 99207 PR NO BILLABLE SERVICE THIS VISIT: CPT | Performed by: INTERNAL MEDICINE

## 2021-08-10 PROCEDURE — 99239 HOSP IP/OBS DSCHRG MGMT >30: CPT | Performed by: INTERNAL MEDICINE

## 2021-08-10 RX ADMIN — LISINOPRIL 20 MG: 20 TABLET ORAL at 10:12

## 2021-08-10 RX ADMIN — FAMOTIDINE 20 MG: 20 TABLET ORAL at 10:12

## 2021-08-10 RX ADMIN — CITALOPRAM HYDROBROMIDE 20 MG: 20 TABLET ORAL at 10:12

## 2021-08-10 RX ADMIN — METOPROLOL SUCCINATE 25 MG: 25 TABLET, EXTENDED RELEASE ORAL at 10:12

## 2021-08-10 RX ADMIN — GABAPENTIN 600 MG: 300 CAPSULE ORAL at 10:12

## 2021-08-10 RX ADMIN — SIMVASTATIN 40 MG: 40 TABLET, FILM COATED ORAL at 10:12

## 2021-08-10 RX ADMIN — LEVOTHYROXINE SODIUM 88 MCG: 0.09 TABLET ORAL at 06:42

## 2021-08-10 RX ADMIN — OLOPATADINE HYDROCHLORIDE 1 DROP: 1 SOLUTION OPHTHALMIC at 10:21

## 2021-08-10 RX ADMIN — METFORMIN HYDROCHLORIDE 500 MG: 500 TABLET, FILM COATED ORAL at 10:12

## 2021-08-10 RX ADMIN — CYCLOBENZAPRINE HYDROCHLORIDE 10 MG: 5 TABLET, FILM COATED ORAL at 10:12

## 2021-08-10 ASSESSMENT — ACTIVITIES OF DAILY LIVING (ADL)
ADLS_ACUITY_SCORE: 29

## 2021-08-10 NOTE — DISCHARGE SUMMARY
Pt discharging back home via EMS with becky in place, all belongings were sent home with patient at time of discharge and family was updated and educated this afternoon by day nurse.

## 2021-08-10 NOTE — DISCHARGE SUMMARY
Hospitalist Discharge Summary  [facility]    Nhi Perry MRN# [id number]   YOB: 1925 Age: 89 year old     Date of Admission:  8/5/2021  Date of Discharge:  8/10/2021  Admitting Physician:  Alejandro Collier MD  Discharge Physician:  Norberto Ramos DO  Discharging Service:  Hospitalist     Primary Provider: Fabio Natarajan          Discharge Diagnosis:     1.  Acute on Chronic Hypoxic Respiratory Failure s/p Covid Pneumonia diagnosed on 6/22/2021  - CT chest showed improving bilateral pulmonary opacities  - Weaned off of O2 on 8/7/2021  - Consult Speech Therapy with concern for aspiration  - Improved  - Continue Xarelto for covid prophylaxis  - PT/OT recommending TCU.  Family prefers to take pt home.  I encouraged the family to send the pt to TCU as she is requiring significant assistance.  They state her son is coming from [place] and they want her home to see him, then they would send him to TCU.  Discussed that this is not possible given her extra needs.  Daughter insists on home with home care/PT/OT.       2.  Diabetes Mellitus Type 2  - A1C = 6.8  - Insulin sliding scale  - PTA metformin on hold secondary to lactic acidosis     3.  Diarrhea  - Resolved  - Possibly secondary to abx side effect as recently on Augmentin     4.  Acute Metabolic Encephalopathy with Hospital Delerium  - CT head with no acute abnormality  - Continue prn seroquel     5.  Lactic Acidosis  - Likely secondary to brochodilators and hypoxia  - Improved     6.  Normocytic Anemia   - Stable  - Baseline Hgb 10  - Daily CBC     7.  Urinary Retention  - Has required several straight caths  - Consult Urology  - Continue pena  - Follow up with Urology as an outpatient     Chronic Medical Problems:  Carotid Artery Disease s/p Carotid Surgery  Osteoarthritis  Osteoporosis  Hypothyroidism  Hx of Pulmonary Tuberculosis  Hypertension             Discharge Disposition:     Discharged to home with home  care/PT/OT           Allergies:     Allergies   Allergen Reactions     Seasonal Allergies               Discharge Medications:     Current Discharge Medication List      CONTINUE these medications which have CHANGED    Details   lisinopril (ZESTRIL) 20 MG tablet Take 1 tablet (20 mg) by mouth daily  Qty: 30 tablet, Refills: 0    Associated Diagnoses: Hypertension, unspecified type         CONTINUE these medications which have NOT CHANGED    Details   acetaminophen (TYLENOL) 325 MG tablet Take 650 mg by mouth every 8 hours as needed for pain       albuterol (PROAIR HFA/PROVENTIL HFA/VENTOLIN HFA) 108 (90 Base) MCG/ACT inhaler Inhale 2 puffs into the lungs every 6 hours as needed for shortness of breath / dyspnea or wheezing  Qty: 6.7 g, Refills: 0      bisacodyl (DULCOLAX) 10 MG suppository Place 1 suppository rectally daily as needed.  Qty: 12 suppository, Refills: 3    Associated Diagnoses: Constipation      celecoxib (CELEBREX) 200 MG capsule Take 200 mg by mouth daily as needed for moderate pain      Cetirizine HCl (ZYRTEC ALLERGY PO) Take 10 mg by mouth daily as needed.      citalopram (CELEXA) 20 MG tablet Take 20 mg by mouth daily      cyclobenzaprine (FLEXERIL) 10 MG tablet Take 10 mg by mouth daily      donepezil (ARICEPT) 10 MG tablet Take 10 mg by mouth At Bedtime      !! fluocinolone (SYNALAR) 0.025 % cream Apply topically 2 times daily      !! fluocinolone (SYNALAR) 0.025 % cream Apply topically 2 times daily      fluticasone (FLONASE) 50 MCG/ACT nasal spray Spray 2 sprays into both nostrils daily as needed for rhinitis or allergies      fluticasone (FLOVENT HFA) 220 MCG/ACT inhaler Inhale 2 puffs into the lungs 2 times daily      gabapentin (NEURONTIN) 300 MG capsule Take 600 mg by mouth 3 times daily      levothyroxine (SYNTHROID/LEVOTHROID) 88 MCG tablet Take 88 mcg by mouth daily      metFORMIN (GLUCOPHAGE) 500 MG tablet Take 1 tablet by mouth 2 times daily (with meals).  Qty: 180 tablet, Refills: 4     Associated Diagnoses: Type 2 diabetes, HbA1c goal < 7% (H)      metoprolol succinate ER (TOPROL-XL) 25 MG 24 hr tablet Take 25 mg by mouth daily      Olopatadine HCl (PATANOL OP) Place 1 drop into both eyes as needed      QUEtiapine (SEROQUEL XR) 50 MG TB24 24 hr tablet Take 50 mg by mouth nightly as needed      rivaroxaban ANTICOAGULANT (XARELTO) 10 MG TABS tablet Take 1 tablet (10 mg) by mouth daily (with dinner)  Qty: 30 tablet, Refills: 0    Associated Diagnoses: Pneumonia due to 2019 novel coronavirus      simvastatin (ZOCOR) 40 MG tablet Take 40 mg by mouth daily       zolpidem (AMBIEN) 5 MG tablet Take 5 mg by mouth At Bedtime       guaiFENesin-dextromethorphan (ROBITUSSIN DM) 100-10 MG/5ML syrup Take 10 mLs by mouth 4 times daily as needed for cough  Qty: 236 mL, Refills: 0    Comments: Future refills by PCP  Physician No Ref-Primary with phone number None.  Associated Diagnoses: Pneumonia due to 2019 novel coronavirus      lidocaine (LIDODERM) 5 % patch 1-2 patches daily as needed       memantine XR (NAMENDA XR) 28 MG 24 hr capsule Take 28 mg by mouth daily      senna-docusate (SENOKOT-S/PERICOLACE) 8.6-50 MG tablet Take 2 tablets by mouth 2 times daily as needed for constipation       !! - Potential duplicate medications found. Please discuss with provider.                 Condition on Discharge:     Discharge condition: Fair   Discharge vitals: Blood pressure (!) 162/72, pulse 95, temperature 98.3  F (36.8  C), temperature source Oral, resp. rate 20, SpO2 92 %.   Code status on discharge: DNR / DNI      BASIC PHYSICAL EXAMINATION:  GENERAL: No apparent distress.  CARDIOVASCULAR: Regular rate and rhythm without murmurs.  PULMONARY: Clear to auscultation bilaterally.   GASTROINTESTINAL: Abdomen soft, non-tender.  EXTREMITIES: No edema, pulses intact.  NEUROLOGIC: No focal deficits.            History of Illness:   See detailed admission note for full details.               Procedures excluding imaging  which is summarized below:     Please see details in the electronic medical record.           Consultations:     CARE MANAGEMENT / SOCIAL WORK IP CONSULT  OCCUPATIONAL THERAPY ADULT IP CONSULT  PHYSICAL THERAPY ADULT IP CONSULT  UROLOGY IP CONSULT  SPEECH LANGUAGE PATH ADULT IP CONSULT  SOCIAL WORK IP CONSULT          Significant Results:     Results for orders placed or performed during the hospital encounter of 08/05/21   CT Chest Pulmonary Embolism w Contrast    Narrative    CT CHEST PULMONARY EMBOLISM W CONTRAST 8/5/2021 3:17 PM    CLINICAL HISTORY: PE suspected, low/intermediate prob, positive  D-dimer, hypertension, type 2 diabetes mellitus, acute on chronic  respiratory failure with hypoxia, increased shortness of breath  TECHNIQUE: CT angiogram chest during arterial phase injection IV  contrast. 2D and 3D MIP reconstructions were performed by the CT  technologist. Dose reduction techniques were used.     CONTRAST: 55mL [id number]    COMPARISON: CT chest exams 7/12/2021 and 7/1/2021    FINDINGS:  ANGIOGRAM CHEST: Pulmonary arteries are normal caliber and negative  for pulmonary emboli. Thoracic aorta is negative for dissection. No CT  evidence of right heart strain.    LUNGS AND PLEURA: The central airways are clear. Mild bronchiectasis.  Mild to moderate improvement of the previously seen diffuse bilateral  multilobar pulmonary airspace opacities. 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 No pleural effusion.    MEDIASTINUM/AXILLAE: No thoracic adenopathy. Normal heart size and no  pericardial effusion.    UPPER ABDOMEN: Postcholecystectomy.    MUSCULOSKELETAL: Osseous demineralization. Spinal degenerative  changes.      Impression    IMPRESSION:  1.  No pulmonary artery embolism or thoracic aortic dissection.  2.  Mild to moderate diffuse bilateral multilobar pulmonary airspace  opacities which are mild to  moderately improved since the prior CT  from 7/12/2021, likely reflecting resolving pneumonia. No pleural  effusion.    GERSON MORRISON MD         SYSTEM ID:  [id number]   CT Head w/o Contrast    Narrative    EXAM: CT HEAD W/O CONTRAST  LOCATION: [facility]  DATE/TIME: 8/6/2021 4:50 PM    INDICATION: Encephalopathy. Admitted for worsening shortness of breath, generalized weakness, hypoxia.  COMPARISON: CT brain 07/28/2011.  TECHNIQUE: Routine CT Head without IV contrast. Multiplanar reformats. Dose reduction techniques were used.    FINDINGS:  INTRACRANIAL CONTENTS: No finding for intracranial hemorrhage or mass or convincing finding for acute infarct. Moderate enlargement of the lateral and third ventricles and mild prominence of the sulci. Patchy areas of low-attenuation change are seen   within the cerebral white matter, nonspecific and favored to reflect sequela of chronic microvascular ischemic change and progressed compared to the 2011 prior. No transcortical areas low-attenuation change. No mass effect or midline shift.    Cerebellar tonsils are normally positioned. Sella is unremarkable for technique. Corpus callosum is normally formed.     VISUALIZED ORBITS/SINUSES/MASTOIDS: Prior cataract surgery on the right. Small amount of mucosal thickening is seen within the sphenoid sinus. No air-fluid levels. Cerumen both external auditory canals.    BONES/SOFT TISSUES: Calvarium is intact, without suspicious lytic or blastic foci. Atherosclerotic calcifications are seen within the left vertebral artery and both carotid siphons. Partially mineralized degenerative pannus is seen along the dorsum the   dens. Mild associated narrowing of the foramen magnum which appears adequately patent.      Impression    IMPRESSION:  1.  No finding for intracranial hemorrhage or mass or convincing finding for acute infarct.    2.  Moderate volume loss and presumed sequela chronic microvascular ischemic  change.       Transthoracic Echocardiogram Results:  No results found for this or any previous visit (from the past 4320 hour(s)).             Pending Results:     Unresulted Labs Ordered in the Past 30 Days of this Admission     Date and Time Order Name Status Description    8/5/2021  1:08 PM Blood Culture Peripheral Blood Preliminary     8/5/2021  1:08 PM Blood Culture Hand, Right Preliminary                       Discharge Instructions and Follow-Up:     Discharge instructions and follow-up:   Discharge Procedure Orders   Medication Therapy Management Referral   Referral Priority: Routine Referral Type: Med Therapy Management   Requested Specialty: Pharmacist   Number of Visits Requested: 1     Home care nursing referral     Home Care PT Referral for Hospital Discharge   Referral Priority: Routine Referral Type: Home Health Therapies & Aides   Number of Visits Requested: 1     Home Care OT Referral for Hospital Discharge   Referral Priority: Routine Referral Type: Consultation   Number of Visits Requested: 1     Reason for your hospital stay   Order Comments: Acute Hypoxic Respiratory Failure likely secondary to recent Covid 19 Pneumonia     Follow-up and recommended labs and tests    Order Comments: Follow up with primary care provider, Fabio Natarajan, within 7 days for hospital follow- up.  The following labs/tests are recommended: CBC, BMP.    Follow up with Urology in 2 weeks regarding your urinary retention.     Activity   Order Comments: Your activity upon discharge: activity as tolerated     Order Specific Question Answer Comments   Is discharge order? Yes      MD face to face encounter   Order Comments: Documentation of Face to Face and Certification for Home Health Services    I certify that patient: Nhi Perry is under my care and that I, or a nurse practitioner or physician's assistant working with me, had a face-to-face encounter that meets the physician face-to-face encounter requirements  with this patient on: 8/9/2021.    This encounter with the patient was in whole, or in part, for the following medical condition, which is the primary reason for home health care: weakness.    I certify that, based on my findings, the following services are medically necessary home health services: Nursing, Occupational Therapy, and Physical Therapy.    My clinical findings support the need for the above services because: Nurse is needed: To provide assessment and oversight required in the home to assure adherence to the medical plan due to: weakness.., Occupational Therapy Services are needed to assess and treat cognitive ability and address ADL safety due to impairment in weakness., and Physical Therapy Services are needed to assess and treat the following functional impairments: weakness.    Further, I certify that my clinical findings support that this patient is homebound (i.e. absences from home require considerable and taxing effort and are for medical reasons or Christianity services or infrequently or of short duration when for other reasons) because: Requires assistance of another person or specialized equipment to access medical services because patient: The patient requires assistance from another person(s) in order to transfer between wheelchair, bed, commode, and other surfaces in home.  The patient cannot be safely transferred without a lift due to their medical condition.  The lift is accessible throughout the patient's home.  Is unable to operate assistive equipment on their own, without the sean lift patient would be bed confined...    Based on the above findings. I certify that this patient is confined to the home and needs intermittent skilled nursing care, physical therapy and/or speech therapy.  The patient is under my care, and I have initiated the establishment of the plan of care.  This patient will be followed by a physician who will periodically review the plan of care.  Physician/Provider  to provide follow up care: Fabio Natarajan    Attending hospital physician (the Medicare certified [facility] provider): Norberto Ramos DO  Physician Signature: See electronic signature associated with these discharge orders.  Date: 8/9/2021     Lynda Lift   Order Comments: Nhi Perry  Patient Lift  NPI:  [id number]  Diagnosis Code: R26.89  Length of Need:  Lifetime  Height: 152.4 cm  Weight: 68 kg     Miscellaneous DME Order   Order Comments: DME Documentation:   Describe the reason for need to support medical necessity: generalized weakness.     I, the undersigned, certify that the above prescribed supplies are medically necessary for this patient and is both reasonable and necessary in reference to accepted standards of medical and necessary in reference to accepted standards of medical practice in the treatment of this patient's condition and is not prescribed as a convenience.     Order Specific Question Answer Comments   DME Provider: [facility]    DME Item Needed: Lynda Lift    Length of Need: Lifetime      Diet   Order Comments: Follow this diet upon discharge: Orders Placed This Encounter      Combination Diet Regular Diet Adult; Mildly Thick (level 2); Consistent Carb 75 Grams CHO per Meal Diet     Order Specific Question Answer Comments   Is discharge order? Yes              Hospital Course:     Nhi Perry is a 89 year old female with a history of type 2 diabetes mellitus, remote history of pulmonary tuberculosis, carotid artery disease s/p carotid surgery, hypothyroidism, recent Covid pneumonia with subsequent suspected bacterial superinfection admitted on 8/5/2021 with shortness of breath, generalized weakness, hypoxia, and diarrhea.  The patient had been diagnosed with COVID-19 pneumonia on 6/22/2021.  She was hospitalized from 7/127/5/2021 for Covid pneumonia.  She was also hospitalized at [facility] from 7/11 to 7/16/2021 for bilateral pneumonia with acute hypoxia.  She was  treated with IV antibiotics at that time including Zithromax and Zosyn and discharged on Augmentin.  The patient was discharged home with 2 L of oxygen at night.  Prior to arrival the patient was noted to be hypoxic to 80% requiring supplemental oxygen.  The patient had a CT chest showing no pulmonary embolism or thoracic aortic dissection however did note mild to moderate diffuse bilateral multilobar pulmonary airspace opacities slightly improved since prior exam.  The patient was started on breathing treatments and her oxygen was weaned.  The patient was seen by Occupational Therapy who recommended TCU.  The patient did have some urinary retention and a pena catheter was placed.  Urology was consulted and recommended outpatient follow up.  On 8/9/2021, the patient was medically stable for discharge to TCU.  The patient's family refused TCU and requested pt go home with family.    On 8/10/2021, a sean lift was coordinated for the patient and at that point the patient was discharged home with home care, PT, OT.    The patient was seen, examined, and counseled on this day. The hospitalization and plan of care was reviewed with the patient extensively. All questions were addressed and the patient agreed to follow-up as noted above.      Total time spent in face to face contact with the patient and coordinating discharge was:  39 Minutes    Norberto Ramos DO  Formerly [facility] Hospitalist  [address] [place] [address]  08/10/2021

## 2021-08-10 NOTE — DISCHARGE INSTRUCTIONS
Home Health Care [facility] home care will continue to follow you for Nursing and Therapies at home (RN/PT/OT). They have been informed of your discharge and orders were faxed to them. If you have not heard from them in 24-48hrs please call [phone].

## 2021-08-10 NOTE — PROGRESS NOTES
[facility]    Hospitalist Progress Note  Name: Nhi Perry    MRN: [id number]  Provider:  Norberto Ramos DO  Date of Service: 08/10/2021    Summary of Stay: Nhi Perry is a 89 year old female with a history of type 2 diabetes mellitus, remote history of pulmonary tuberculosis, carotid artery disease s/p carotid surgery, hypothyroidism, recent Covid pneumonia with subsequent suspected bacterial superinfection admitted on 8/5/2021 with shortness of breath, generalized weakness, hypoxia, and diarrhea.  The patient had been diagnosed with COVID-19 pneumonia on 6/22/2021.  She was hospitalized from 7/127/5/2021 for Covid pneumonia.  She was also hospitalized at [facility] from 7/11 to 7/16/2021 for bilateral pneumonia with acute hypoxia.  She was treated with IV antibiotics at that time including Zithromax and Zosyn and discharged on Augmentin.  The patient was discharged home with 2 L of oxygen at night.  Prior to arrival the patient was noted to be hypoxic to 80% requiring supplemental oxygen.  The patient had a CT chest showing no pulmonary embolism or thoracic aortic dissection however did note mild to moderate diffuse bilateral multilobar pulmonary airspace opacities slightly improved since prior exam.  The patient was started on breathing treatments and her oxygen was weaned.  The patient was seen by Occupational Therapy who recommended TCU.  The patient did have some urinary retention and a pena catheter was placed.  Urology was consulted and recommended outpatient follow up.  On 8/9/2021, the patient was medically stable for discharge to TCU.  The patient's family refused TCU and requested pt go home with family.    On 8/10/2021, a sean lift was coordinated for the patient and at that point the patient was discharged home with home care, PT, OT.    TODAY'S PLAN:  Pt is medically stable for discharge home with home care, PT, OT today.  Discussed with daughter.  All  questions answered.  Discussed that the patient will have a pena catheter and she should follow-up with urology in 2 weeks.  We also discussed that the patient should resume her previous home oxygen 1 L nasal cannula at night.  We discussed that the patient should follow-up with her primary care doctor in a week to have home oxygen evaluation done at that time.    Problem List:   1.  Acute on Chronic Hypoxic Respiratory Failure s/p Covid Pneumonia diagnosed on 6/22/2021  - CT chest showed improving bilateral pulmonary opacities  - Weaned off of O2 on 8/7/2021  - Consult Speech Therapy with concern for aspiration  - Improved  - Continue Xarelto for covid prophylaxis  - PT/OT recommending TCU.  Family prefers to take pt home.  I encouraged the family to send the pt to TCU as she is requiring significant assistance.  They state her son is coming from [place] and they want her home to see him, then they would send him to TCU.  Discussed that this is not possible given her extra needs.  Daughter insists on home with home care/PT/OT.       2.  Diabetes Mellitus Type 2  - A1C = 6.8  - Insulin sliding scale  - PTA metformin on hold secondary to lactic acidosis     3.  Diarrhea  - Resolved  - Possibly secondary to abx side effect as recently on Augmentin     4.  Acute Metabolic Encephalopathy with Hospital Delerium  - CT head with no acute abnormality  - Continue prn seroquel     5.  Lactic Acidosis  - Likely secondary to brochodilators and hypoxia  - Improved     6.  Normocytic Anemia   - Stable  - Baseline Hgb 10  - Daily CBC     7.  Urinary Retention  - Has required several straight caths  - Consult Urology  - Continue pena  - Follow up with Urology as an outpatient     Chronic Medical Problems:  Carotid Artery Disease s/p Carotid Surgery  Osteoarthritis  Osteoporosis  Hypothyroidism  Hx of Pulmonary Tuberculosis  Hypertension    DVT Prophylaxis: Enoxaparin (Lovenox) SQ  Code Status: No CPR- Do NOT Intubate  Diet:  Combination Diet Regular Diet Adult; Mildly Thick (level 2); Consistent Carb 75 Grams CHO per Meal Diet  Diet  Room Service    Hawley Catheter: PRESENT, indication: Retention  Disposition: Expected discharge today to home with home care/PT/OT. Goals prior to discharge include sean lift coordinated.   Family updated today: Yes      Interval History   Pt seen and examined.  Pt sleeping on arrival.    -Data reviewed today: I personally reviewed all new labs and imaging results over the last 24 hours.     Physical Exam   Temp: 97.8  F (36.6  C) Temp src: Axillary BP: (!) 141/55 Pulse: 87   Resp: 22 SpO2: 90 % O2 Device: None (Room air)    There were no vitals filed for this visit.  Vital Signs with Ranges  Temp:  [97.8  F (36.6  C)-98.9  F (37.2  C)] 97.8  F (36.6  C)  Pulse:  [86-92] 87  Resp:  [20-22] 22  BP: (127-187)/(55-71) 141/55  SpO2:  [90 %-96 %] 90 %  I/O last 3 completed shifts:  In: 360 [P.O.:360]  Out: 525 [Urine:525]    GENERAL: No apparent distress. Sleeping.  HEENT: Normocephalic, atraumatic. Extraocular movements intact.  CARDIOVASCULAR: Regular rate and rhythm without murmurs or rubs. No S3.  PULMONARY: Clear bilaterally.  GASTROINTESTINAL: Soft, non-tender, non-distended. Bowel sounds normoactive.   EXTREMITIES: No cyanosis or clubbing. No edema.  NEUROLOGICAL: CN 2-12 grossly intact, no focal neurological deficits.  DERMATOLOGICAL: No rash, ulcer, bruising, nor jaundice.    Medications     - MEDICATION INSTRUCTIONS -         citalopram  20 mg Oral Daily     cyclobenzaprine  10 mg Oral Daily     donepezil  10 mg Oral At Bedtime     famotidine  20 mg Oral Daily     fluticasone  1 puff Inhalation Daily     gabapentin  600 mg Oral TID     insulin aspart  1-7 Units Subcutaneous TID AC     insulin aspart  1-5 Units Subcutaneous At Bedtime     levothyroxine  88 mcg Oral QAM AC     lisinopril  20 mg Oral Daily     metFORMIN  500 mg Oral BID w/meals     metoprolol succinate ER  25 mg Oral Daily      olopatadine  1 drop Both Eyes BID     QUEtiapine  50 mg Oral At Bedtime     rivaroxaban ANTICOAGULANT  10 mg Oral Daily with supper     simvastatin  40 mg Oral Daily     sodium chloride (PF)  3 mL Intracatheter Q8H     zolpidem  5 mg Oral At Bedtime     Data     Laboratory:  Recent Labs   Lab 08/10/21  1031 08/06/21  0727 08/05/21  1334   WBC 9.3 7.0 8.7   HGB 9.3* 8.9* 10.6*   HCT 29.1* 29.0* 33.7*   MCV 99 100 98    182 241     Recent Labs   Lab 08/10/21  1031 08/10/21  0837 08/10/21  0228 08/08/21  0615 08/07/21  1004     --   --  141 139   POTASSIUM 4.3  --   --  4.6 4.5   CHLORIDE 107  --   --  111* 108   CO2 28  --   --  24 23   ANIONGAP 4  --   --  6 8   * 104* 132* 113* 169*   BUN 16  --   --  15 11   CR 0.95  --   --  1.16* 0.91   GFRESTIMATED 51*  --   --  40* 54*   CHRIS 8.9  --   --  8.2* 8.2*     No results for input(s): CULT in the last 168 hours.    Imaging:  No results found for this or any previous visit (from the past 24 hour(s)).      Norberto Ramos DO  Critical access hospital Hospitalist  [address] [place] [address]  08/10/2021

## 2021-08-10 NOTE — PLAN OF CARE
Presentation/Diagnosis: Pt admitted  with SOB, weakness, hypoxia, diarrhea.   History: TB, CAD. Hypothyroidism, Dm2, dementia   Labs/Protocols: B   Vitals: VSS. See flow sheets.   Telemetry: NSR  Respiratory: RA. LS dim.   Neuro: Confused. Disorientedx4.   GI/: Hawley.   Skin: See flow sheets.   LDA's: Left SL   Diet: Nectar thickened.   Activity: A2 with lift      Plan: Plan is to discharge home with home cares today. Continue POC.

## 2021-08-10 NOTE — PROGRESS NOTES
ADDENDUM 1430:  Call received from Loid[facility] at [facility]. They need a slight change again in documentation which has been done and re faxed to them. All documentation is complete and Lift will be delivered to pt's home after 1600 today.    Call placed to [facility] transportation and stretcher transport was arranged for 1730 today. Daughter in room with patient and was updated with transportation time and lift delivery.    ADDENDUM 1340:  Call placed again to [facility] to check on status of lift approval and delivery. They are still processing. Asked for them to expedite the process as pt has discharge orders for home. They will call when approval has been received.     Call placed to [facility] [phone] to inform them of pt discharge today. MD discharge orders were faxed to fax: [phone].    Call placed to daughter Shana to update her on Lift process and verify transportation. She would like stretcher transportation arranged for home. They have used this in the past. She is coming to the hospital to learn pena cares and education on discharge.      Care Management Follow Up    Length of Stay (days): 5    Expected Discharge Date: 08/10/2021     Concerns to be Addressed: all concerns addressed in this encounter     Patient plan of care discussed at interdisciplinary rounds: Yes    Anticipated Discharge Disposition: Home Care     Anticipated Discharge Services: [facility] RN/PT/OT   Anticipated Discharge DME: Other (see comment) (sean lift)      Additional Information:  RN CC following up on lift status from [facility]. Call placed to Loida from [facility] [phone] to ask status of lift delivery and insurance authorization. [facility] states they needs additional information: diagnosis, ht, wt, length of need and updated face to face of pt need for lift. MD paged and orders fixed and faxed to fax team: [phone]. Awaiting return call. [facility] was informed that pt is  ready for discharge today and lift would need to be delivered this afternoon.     Call received this morning from Ronda Hammond  [phone] for pt with update on discharge plan and needs. She was updated on plan to discharge home today with resumption of home care orders and new lift.          Gabby Vera RN BSN CM  Inpatient Care Coordination  [facility]  [phone]

## 2021-08-10 NOTE — PLAN OF CARE
Patient alert only to self. Israeli speaking. 2A with lift. Hawley in place, patent. VSS, LS clear/diminished on RA. Denies pain and no nonverbal indicators of pain present. Skin abrasion on R groin and redness under breasts/sacrum. JOSE CMS due to mentation. Plan to discharge home later today with homecare.    /64 (BP Location: Left arm)   Pulse 90   Temp 98.6  F (37  C) (Oral)   Resp 20   SpO2 91%

## 2021-08-10 NOTE — PLAN OF CARE
Presentation/Diagnosis: Pt admitted  with SOB, weakness, hypoxia, diarrhea.   History: TB, CAD. Hypothyroidism, Dm2, dementia   Labs/Protocols: B   Vitals: VSS. See flow sheets.   Telemetry: NSR/afib   Respiratory: RA. LS dim.   Neuro: Confused. Disorientedx4.   GI/: Hawley.   Skin: See flow sheets.   LDA's: Left SL   Diet: Nectar thickened.   Activity: A2 with lift      Plan: Plan is to discharge home with home cares. Continue POC.

## 2021-08-11 ENCOUNTER — PATIENT OUTREACH (OUTPATIENT)
Dept: CARE COORDINATION | Facility: CLINIC | Age: 86
End: 2021-08-11

## 2021-08-11 DIAGNOSIS — Z71.89 OTHER SPECIFIED COUNSELING: ICD-10-CM

## 2021-08-11 NOTE — PROGRESS NOTES
Clinic Care Coordination    All TCM Discharge questions were answered by the patients daughter  Shana. Verbal consent was heard by CTA and Italian .    Rukhsana Lizarraga  Care Transitions Assistant  [facility] [place]

## 2021-08-11 NOTE — PLAN OF CARE
Occupational Therapy Discharge Summary    Reason for therapy discharge:    Discharged to home with home therapy.    Progress towards therapy goal(s). See goals on Care Plan in [facility] electronic health record for goal details.  Goals not met.  Barriers to achieving goals:   limited tolerance for therapy.    Therapy recommendation(s):    Continued therapy is recommended.  Rationale/Recommendations:  recommend home with home health OT to improve safety and ind with ADLs and provide family/caregiver education. Pt unable to leave them home due to being dependent for transfers. .

## 2021-08-11 NOTE — PROGRESS NOTES
Clinic Care Coordination Contact  [facility]: Post-Discharge Note  SITUATION                                                      Admission:    Admission Date: 08/05/21   Reason for Admission: Acute on Chronic Hypoxic Respiratory Failure s/p Covid Pneumonia diagnosed on 6/22/2021  Discharge:   Discharge Date: 08/10/21  Discharge Diagnosis: Acute on Chronic Hypoxic Respiratory Failure s/p Covid Pneumonia diagnosed on 6/22/2021    BACKGROUND                                                      89 year old female with a history of type 2 diabetes mellitus, remote history of pulmonary tuberculosis, carotid artery disease s/p carotid surgery, hypothyroidism, recent Covid pneumonia with subsequent suspected bacterial superinfection admitted on 8/5/2021 with shortness of breath, generalized weakness, hypoxia, and diarrhea.  The patient had been diagnosed with COVID-19 pneumonia on 6/22/2021.  She was hospitalized from 7/127/5/2021 for Covid pneumonia.  She was also hospitalized at [facility] from 7/11 to 7/16/2021 for bilateral pneumonia with acute hypoxia.  She was treated with IV antibiotics at that time including Zithromax and Zosyn and discharged on Augmentin.  The patient was discharged home with 2 L of oxygen at night.  Prior to arrival the patient was noted to be hypoxic to 80% requiring supplemental oxygen.  The patient had a CT chest showing no pulmonary embolism or thoracic aortic dissection however did note mild to moderate diffuse bilateral multilobar pulmonary airspace opacities slightly improved since prior exam.  The patient was started on breathing treatments and her oxygen was weaned.  The patient was seen by Occupational Therapy who recommended TCU.  The patient did have some urinary retention and a pena catheter was placed.  Urology was consulted and recommended outpatient follow up.  On 8/9/2021, the patient was medically stable for discharge to TCU.  The patient's family refused TCU and  requested pt go home with family.    On 8/10/2021, a sean lift was coordinated for the patient and at that point the patient was discharged home with home care, PT, OT.       ASSESSMENT      Discharge Assessment  How are you doing now that you are home?: doing ok  How are your symptoms? (Red Flag symptoms escalate to triage hotline per guidelines): Unchanged  Do you feel your condition is stable enough to be safe at home until your provider visit?: Yes  Does the patient have their discharge instructions? : Yes  Does the patient have questions regarding their discharge instructions? : No  Were you started on any new medications or were there changes to any of your previous medications? :  (Zestril increased)  Does the patient have all of their medications?: Yes  Do you have questions regarding any of your medications? : No  Do you have all of your needed medical supplies or equipment (DME)?  (i.e. oxygen tank, CPAP, cane, etc.): Yes  Discharge follow-up appointment scheduled within 14 calendar days? : Yes  Discharge Follow Up Appointment Date: 08/13/21  Discharge Follow Up Appointment Scheduled with?: Primary Care Provider    Post-op  Did the patient have surgery or a procedure: No  Fever: No  Chills: No  Eating & Drinking: eating and drinking without complaints/concerns  PO Intake: regular diet  Bowel Function: normal  Date of last BM: 08/11/21  Urinary Status: voiding without complaint/concerns        PLAN                                                      Outpatient Plan:    Follow up with primary care provider, Fabio Natarajan, within 7 days for hospital follow- up.  The following labs/tests are recommended: CBC, BMP.     Follow up with Urology in 2 weeks regarding your urinary retention.          No future appointments.      For any urgent concerns, please contact our 24 hour nurse triage line: [phone] ([phone])         Tracy Lizarraga MA

## 2021-08-11 NOTE — PLAN OF CARE
Physical Therapy Discharge Summary    Reason for therapy discharge:    Discharged to home.    Progress towards therapy goal(s). See goals on Care Plan in [facility] electronic health record for goal details.  Goals not met.  Barriers to achieving goals:   discharge from facility.    Therapy recommendation(s):    TCU or home with sean and TREY recommended.